# Patient Record
Sex: FEMALE | Race: WHITE
[De-identification: names, ages, dates, MRNs, and addresses within clinical notes are randomized per-mention and may not be internally consistent; named-entity substitution may affect disease eponyms.]

---

## 2018-01-19 ENCOUNTER — HOSPITAL ENCOUNTER (EMERGENCY)
Dept: HOSPITAL 47 - EC | Age: 83
Discharge: HOME | End: 2018-01-19
Payer: MEDICARE

## 2018-01-19 VITALS
SYSTOLIC BLOOD PRESSURE: 128 MMHG | DIASTOLIC BLOOD PRESSURE: 78 MMHG | HEART RATE: 60 BPM | TEMPERATURE: 97.7 F | RESPIRATION RATE: 17 BRPM
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
## 2018-01-19 DIAGNOSIS — Z79.01: ICD-10-CM

## 2018-01-19 DIAGNOSIS — S62.101A: Primary | ICD-10-CM

## 2018-01-19 DIAGNOSIS — E78.5: ICD-10-CM

## 2018-01-19 DIAGNOSIS — I10: ICD-10-CM

## 2018-01-19 DIAGNOSIS — Z88.0: ICD-10-CM

## 2018-01-19 DIAGNOSIS — Z88.1: ICD-10-CM

## 2018-01-19 DIAGNOSIS — S32.008A: ICD-10-CM

## 2018-01-19 DIAGNOSIS — I48.91: ICD-10-CM
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
## 2018-01-19 DIAGNOSIS — Z87.891: ICD-10-CM

## 2018-01-19 DIAGNOSIS — K21.9: ICD-10-CM

## 2018-01-19 DIAGNOSIS — G25.81: ICD-10-CM

## 2018-01-19 DIAGNOSIS — Z79.899: ICD-10-CM

## 2018-01-19 PROCEDURE — 29125 APPL SHORT ARM SPLINT STATIC: CPT

## 2018-01-19 PROCEDURE — 99283 EMERGENCY DEPT VISIT LOW MDM: CPT

## 2018-01-19 PROCEDURE — 72100 X-RAY EXAM L-S SPINE 2/3 VWS: CPT

## 2018-01-19 NOTE — XR
Right wrist

 

HISTORY: Trauma and pain

 

3 views of the right wrist

 

There is a distal radial metaphyseal intra-articular fracture without significant displacement. Bone 
mineralization is reduced. 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 Soft tissue calcificati
ons suggest possible crystal deposition arthropathy. No dislocation. There is soft tissue swelling pr
esent.

 

IMPRESSION: Fracture as described

## 2018-01-19 NOTE — ED
General Adult HPI





- General


Chief complaint: Fall


Stated complaint: Hand Injury/Swelling, Back Pain


Time Seen by Provider: 18 12:23


Source: patient, RN notes reviewed


Mode of arrival: wheelchair


Limitations: no limitations





- History of Present Illness


Initial comments: 





85-year-old female presents to the emergency department with a chief complaint 

of trip and fall.  The patient lost her balance and she fell to the ground.  

She states that she hit her right wrist on a chair and she landed onto her 

bottom.  Since she's been having some low back pain.  She also complains of 

right wrist pain.  Follow was 2 days ago.  There is no lightheadedness or 

dizziness before the fall.  There was no head injury with the fall.  She states 

that her swelling in her right hand just does not seem to be improving so she 

thought that she should be seen. Patient denies any recent fever, chills, 

shortness of breath, chest pain, abdominal pain, nausea vomiting, numbness or 

tingling, dysuria or hematuria, constipation or diarrhea, headaches or visual 

changes, or any other current symptoms.





- Related Data


 Home Medications











 Medication  Instructions  Recorded  Confirmed


 


Furosemide [Lasix] 40 mg PO DAILY 08/02/15 08/02/15


 


LORazepam [Ativan] 0.5 mg PO HS 08/02/15 08/02/15


 


Multivitamins, Thera [Multivitamin 1 tab PO DAILY 08/02/15 08/02/15





(formulary)]   


 


Omeprazole [PriLOSEC] 20 mg PO AC-BRKFST 08/02/15 08/02/15


 


Pravastatin Sodium [Pravachol] 40 mg PO HS 08/02/15 08/02/15


 


Rivaroxaban [Xarelto] 15 mg PO DAILY 08/02/15 08/02/15


 


Spironolactone [Aldactone] 50 mg PO DAILY 08/02/15 08/02/15


 


Tiotropium 18 Mcg/Puff [Spiriva] 2 puff INHALATION RT-DAILY 08/02/15 08/02/15


 


rOPINIRole HCL [Requip] 1 mg PO HS 08/02/15 08/02/15








 Previous Rx's











 Medication  Instructions  Recorded


 


Brimonidine Tartrate [Alphagan P 1 drops LEFT EYE Q8HR  bottle 08/04/15





0.2% Ophth Soln]  


 


Latanoprost Ophth [Xalatan 0.005%] 1 drops BOTH EYES HS  bottle 08/04/15


 


Lisinopril-Hctz 10-12.5 mg 1 each PO DAILY  tab 08/04/15





[Zestoretic 10-12.5]  


 


Timolol 0.25% Ophth Soln [Timoptic 1 drops LEFT EYE BID  bottle 08/04/15





0.25% Ophth Soln]  


 


Acetaminophen-Codeine 300-30mg 1 tab PO Q4H PRN #20 tablet 18





[Tylenol w/codeine #3]  











 Allergies











Allergy/AdvReac Type Severity Reaction Status Date / Time


 


Penicillins Allergy  Rash/Hives Verified 18 12:19


 


sulfamethoxazole Allergy  Rash/Hives Verified 18 12:19





[From Bactrim]     


 


trimethoprim [From Bactrim] Allergy  Rash/Hives Verified 18 12:19














Review of Systems


ROS Statement: 


Those systems with pertinent positive or pertinent negative responses have been 

documented in the HPI.





ROS Other: All systems not noted in ROS Statement are negative.





Past Medical History


Past Medical History: Atrial Fibrillation, GERD/Reflux, GI Bleed, Hyperlipidemia

, Hypertension


Additional Past Medical History / Comment(s): restless leg syndrome


History of Any Multi-Drug Resistant Organisms: None Reported


Past Surgical History: Appendectomy, Cholecystectomy, Hysterectomy, Joint 

Replacement, Orthopedic Surgery


Additional Past Surgical History / Comment(s): cataracts removed, bilateral 

knees


Past Anesthesia/Blood Transfusion Reactions: No Reported Reaction


Past Psychological History: Anxiety


Smoking Status: Former smoker


Past Alcohol Use History: None Reported


Past Drug Use History: None Reported





- Past Family History


  ** Father


Family Medical History: Coronary Artery Disease (CAD)


Additional Family Medical History / Comment(s): rhemuatic fever,  at 59 of 

"bad heart"





  ** Mother


Family Medical History: Cancer


Additional Family Medical History / Comment(s): uterus





General Exam


Limitations: no limitations


General appearance: alert, in no apparent distress


ENT exam: Present: normal exam, mucous membranes moist


Neck exam: Present: normal inspection.  Absent: tenderness, meningismus, 

lymphadenopathy


Respiratory exam: Present: normal lung sounds bilaterally.  Absent: respiratory 

distress, wheezes, rales, rhonchi, stridor


Cardiovascular Exam: Present: regular rate, normal rhythm, normal heart sounds.

  Absent: systolic murmur, diastolic murmur, rubs, gallop, clicks


Extremities exam: Present: full ROM, tenderness (based the right thumb and into 

the right wrist), normal capillary refill.  Absent: normal inspection (patient 

appears to have swelling to the right hand with some tenderness to the base of 

the right thumb and into the right wrist.)


Back exam: Present: normal inspection, full ROM.  Absent: tenderness, CVA 

tenderness (R), CVA tenderness (L), muscle spasm, paraspinal tenderness, 

vertebral tenderness, rash noted


Neurological exam: Present: alert, oriented X3


Psychiatric exam: Present: normal affect, normal mood


Skin exam: Present: warm, dry, intact, normal color.  Absent: rash





Course


 Vital Signs











  18





  12:17


 


Temperature 97.6 F


 


Pulse Rate 58 L


 


Respiratory 18





Rate 


 


Blood Pressure 130/84


 


O2 Sat by Pulse 94 L





Oximetry 














Procedures





- Orthopedic Splinting/Casting


  ** Injury #1


Side: right


Upper Extremity Injury Location: short arm, wrist


Upper Extremity Immobilizer: sling/shoulder immobilizer, volar splint





Medical Decision Making





- Medical Decision Making





85-year-old female presents emergency Department with a chief complaint of fall 

with low back pain and right wrist pain.  Follow was 2 days ago.  She denies 

any saddle anesthesia or loss of bowel or bladder function.at this time patient 

does appear to have a right wrist fracture as well as a lumbar compression 

fracture.  This and we discussed of lung markings compression fractures of from 

this fall or from a previous injury.  We discussed we will place wrist splint 

for the right wrist.  We'll give her pain medication for home.  We discussed 

follow-up with orthopedics we discussed return parameters all questions.  

Patient stated that she understood and she is given this plan.  She'll be 

discharged.





- Radiology Data


Radiology results: report reviewed, image reviewed





Disposition


Clinical Impression: 


 Fall, Right wrist fracture, Lumbar compression fracture





Disposition: HOME SELF-CARE


Condition: Stable


Instructions:  Wrist Fracture in Adults (ED), Vertebral Compression Fracture (ED

)


Additional Instructions: 


Please use medication as discussed. Please follow up with family doctor if 

symptoms have not improved over the next two days. Please return to the 

emergency room if your symptoms increase or worsen or for any other concerns. 


Prescriptions: 


Acetaminophen-Codeine 300-30mg [Tylenol w/codeine #3] 1 tab PO Q4H PRN #20 

tablet


 PRN Reason: Pain


Referrals: 


Kurt Patel DO [Primary Care Provider] - 1-2 days


Brayan Flores DO [Doctor of Osteopathic Medicine] - 1-2 days


Time of Disposition: 13:07

## 2018-01-19 NOTE — XR
Lumbar spine

 

HISTORY: Pain, trauma

 

3 views of the lumbar spine

 

Lumbar vertebral bodies show preserved alignment. There is loss of height at L1 centrally approximate
ly 2550%. Anterolisthesis grade 1 present at L5-S1. Loss of disc height present at the intervertebral
 levels L4-5 and L5-S1, there is vacuum phenomenon present L5-S1. Bone mineralization is reduced. The
re is multilevel spondylosis. Sclerosis present in the posterior elements of the lower lumbar spine. 
Surgical clips noted incidentally in the upper abdomen.

 

IMPRESSION: Osteoporotic compression fracture L1 may be subacute, MRI or bone scan could be performed
 for additional information. Anterolisthesis grade 1 L5-S1 may be due to facet arthropathy. Degenerat
nirav disc disease.

## 2019-09-11 ENCOUNTER — HOSPITAL ENCOUNTER (EMERGENCY)
Dept: HOSPITAL 47 - EC | Age: 84
Discharge: HOME | End: 2019-09-11
Payer: MEDICARE

## 2019-09-11 VITALS
TEMPERATURE: 99 F | RESPIRATION RATE: 18 BRPM | DIASTOLIC BLOOD PRESSURE: 107 MMHG | SYSTOLIC BLOOD PRESSURE: 153 MMHG | HEART RATE: 48 BPM

## 2019-09-11 DIAGNOSIS — G25.81: ICD-10-CM

## 2019-09-11 DIAGNOSIS — Z98.42: ICD-10-CM
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
## 2019-09-11 DIAGNOSIS — I10: ICD-10-CM

## 2019-09-11 DIAGNOSIS — E78.5: ICD-10-CM

## 2019-09-11 DIAGNOSIS — S09.90XA: ICD-10-CM

## 2019-09-11 DIAGNOSIS — Z88.0: ICD-10-CM

## 2019-09-11 DIAGNOSIS — F41.9: ICD-10-CM

## 2019-09-11 DIAGNOSIS — S51.812A: Primary | ICD-10-CM

## 2019-09-11 DIAGNOSIS — Z87.891: ICD-10-CM

## 2019-09-11 DIAGNOSIS — Z88.2: ICD-10-CM

## 2019-09-11 DIAGNOSIS — K21.9: ICD-10-CM

## 2019-09-11 DIAGNOSIS — Z98.41: ICD-10-CM

## 2019-09-11 DIAGNOSIS — Z79.899: ICD-10-CM

## 2019-09-11 DIAGNOSIS — Z79.01: ICD-10-CM
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
## 2019-09-11 DIAGNOSIS — Z23: ICD-10-CM

## 2019-09-11 DIAGNOSIS — Z96.653: ICD-10-CM

## 2019-09-11 DIAGNOSIS — I48.91: ICD-10-CM

## 2019-09-11 DIAGNOSIS — Z79.890: ICD-10-CM

## 2019-09-11 PROCEDURE — 99284 EMERGENCY DEPT VISIT MOD MDM: CPT

## 2019-09-11 PROCEDURE — 90471 IMMUNIZATION ADMIN: CPT

## 2019-09-11 PROCEDURE — 90715 TDAP VACCINE 7 YRS/> IM: CPT

## 2019-09-11 PROCEDURE — 70450 CT HEAD/BRAIN W/O DYE: CPT

## 2019-09-11 PROCEDURE — 12002 RPR S/N/AX/GEN/TRNK2.6-7.5CM: CPT

## 2019-09-11 PROCEDURE — 72125 CT NECK SPINE W/O DYE: CPT

## 2019-09-11 NOTE — ED
General Adult HPI





- General


Chief complaint: Fall


Stated complaint: Fall


Time Seen by Provider: 19 09:55


Source: patient, RN notes reviewed


Mode of arrival: EMS


Limitations: no limitations





- History of Present Illness


Initial comments: 





This is an 87-year-old female who presents to the emergency department 

complaining that she fell in the bathtub.  Patient states she fell and hit her 

head in the left occipital region on the spit it if she went down.  Patient also

has a skin tear on her left posterior forearm.  Patient states she did not lose 

consciousness she was not days she states it is a little tender in the left 

occipital region.  Patient denies any neck pain patient denies numbness 

weakness.  Patient is on Xarelto.  Patient denies any chest pain palpitations 

difficulty breathing shortness of breath per patient denies any back pain 

patient denies any extremity pain besides the area of the skin tear.  Patient 

has full range of motion of all her joints.





- Related Data


                                Home Medications











 Medication  Instructions  Recorded  Confirmed


 


LORazepam [Ativan] 0.5 mg PO HS PRN 08/02/15 09/11/19


 


Omeprazole [PriLOSEC] 20 mg PO AC-BRKFST 08/02/15 09/11/19


 


Pravastatin Sodium [Pravachol] 40 mg PO HS 08/02/15 09/11/19


 


Rivaroxaban [Xarelto] 15 mg PO HS 08/02/15 09/11/19


 


Tiotropium 18 Mcg/Puff [Spiriva] 2 puff INHALATION RT-DAILY 08/02/15 09/11/19


 


rOPINIRole HCL [Requip] 1 mg PO HS 08/02/15 09/11/19


 


Cyanocobalamin (Vitamin B-12) 1,000 mcg PO DAILY 19





[Vitamin B-12]   


 


Donepezil HCl [Aricept] 10 mg PO DAILY 19


 


Furosemide [Lasix] 20 mg PO DAILY 19


 


Latanoprost Ophth [Xalatan 0.005%] 1 drops LEFT EYE HS 19


 


Levothyroxine Sodium [Synthroid] 25 mcg PO DAILY 19


 


Lisinopril [Zestril] 10 mg PO HS 19


 


Spironolactone [Aldactone] 25 mg PO DAILY 19


 


Vit C/E/Zn/Coppr/Lutein/Zeaxan 1 cap PO DAILY 19





[Preservision Areds 2 Softgel]   











                                    Allergies











Allergy/AdvReac Type Severity Reaction Status Date / Time


 


Penicillins Allergy  Rash/Hives Verified 19 10:25


 


sulfamethoxazole Allergy  Rash/Hives Verified 19 10:25





[From Bactrim]     


 


trimethoprim [From Bactrim] Allergy  Rash/Hives Verified 19 10:25














Review of Systems


ROS Statement: 


Those systems with pertinent positive or pertinent negative responses have been 

documented in the HPI.





ROS Other: All systems not noted in ROS Statement are negative.





Past Medical History


Past Medical History: Atrial Fibrillation, GERD/Reflux, GI Bleed, 

Hyperlipidemia, Hypertension


Additional Past Medical History / Comment(s): restless leg syndrome


History of Any Multi-Drug Resistant Organisms: None Reported


Past Surgical History: Appendectomy, Cholecystectomy, Hysterectomy, Joint 

Replacement, Orthopedic Surgery


Additional Past Surgical History / Comment(s): cataracts removed, bilateral 

knees


Past Anesthesia/Blood Transfusion Reactions: No Reported Reaction


Past Psychological History: Anxiety


Smoking Status: Former smoker


Past Alcohol Use History: None Reported


Past Drug Use History: None Reported





- Past Family History


  ** Father


Family Medical History: Coronary Artery Disease (CAD)


Additional Family Medical History / Comment(s): rhemuatic fever,  at 59 of 

"bad heart"





  ** Mother


Family Medical History: Cancer


Additional Family Medical History / Comment(s): uterus





General Exam





- General Exam Comments


Initial Comments: 





GENERAL:


Patient is well-developed and well-nourished.  Patient is nontoxic and well-

hydrated and is in mild distress.  Patient has some tenderness in the left 

occipital region of the scalp there is no redness bleeding or hematoma noted





ENT:


Neck is soft and supple.  No significant lymphadenopathy is noted.  Oropharynx 

is clear.  Moist mucous membranes.  Neck has full range of motion without 

eliciting any pain. 





EYES:


The sclera were anicteric and conjunctiva were pink and moist.  Extraocular 

movements were intact and pupils were equal round and reactive to light.  

Eyelids were unremarkable.





PULMONARY:


Unlabored respirations.  Good breath sounds bilaterally.  No audible rales 

rhonchi or wheezing was noted.





CARDIOVASCULAR:


There is a regular rate and rhythm without any murmurs gallops or rubs.  





ABDOMEN:


Soft and nontender with normal bowel sounds.  No palpable organomegaly was 

noted.  There is no palpable pulsatile mass.





SKIN:


Patient has a skin tear on the left forearm measuring about 4 cm x 4 cm.





NEUROLOGIC:


Patient is alert and oriented x3.  Cranial nerves II through XII are grossly 

intact.  Motor and sensory are also intact.  Normal speech, volume and content. 

Symmetrical smile. 





MUSCULOSKELETAL:


Normal extremities with adequate strength and full range of motion.  No lower 

extremity swelling or edema.  No calf tenderness.





LYMPHATICS:


No significant lymphadenopathy is noted





PSYCHIATRIC:


Normal psychiatric evaluation.  


Limitations: no limitations





Course


                                   Vital Signs











  19





  09:55


 


Temperature 99.0 F


 


Pulse Rate 48 L


 


Respiratory 18





Rate 


 


Blood Pressure 153/107


 


O2 Sat by Pulse 99





Oximetry 














Medical Decision Making





- Medical Decision Making





CT of the brain showed no acute injury CT of the C-spine showed no acute injury





Patient received a tetanus shot in the emergency department and had a skin tear 

closed with Dermabond





Disposition


Clinical Impression: 


 Fall, Head injury, Skin tear





Disposition: HOME SELF-CARE


Condition: Good


Instructions (If sedation given, give patient instructions):  Fall Prevention 

for Older Adults (ED)


Is patient prescribed a controlled substance at d/c from ED?: No


Referrals: 


Kurt Patel DO [Primary Care Provider] - 1-2 days


Time of Disposition: 12:14

## 2019-09-11 NOTE — CT
EXAMINATION TYPE: CT brain pam wo con

 

DATE OF EXAM: 9/11/2019

 

COMPARISON: Brain 10/29/2012

 

HISTORY: 87-year-old female pain after Slip and fall in the bathtub

 

CT DLP: 1265.3 mGycm

Automated exposure control for dose reduction was used.

 

Technique:  Examination of the head was done in axial plane without intravenous contrast. Coronal and
 sagittal reconstructions performed.

 

CT of the cervical spine was obtained in axial plane without intravenous injection of  contrast mater
ial.  Coronal and sagittal reformatted images were obtained from the axial views for evaluation of  f
ractures, spinal alignment and canal.

 

 

FINDINGS:

 

Head:

There is no evidence of  acute intracranial hemorrhage, acute ischemic changes, mass, mass-effect, or
 extra-axial fluid collection.  There is no effacement of cerebral sulci or basal subarachnoid cister
ns.  There is no hydrocephalus.  There is no midline shift.  Gray-white matter distinction is preserv
ed.

 

Ophthalmologic device along the outer anterior aspect of the left globe. Mastoid air cells well pneum
atized. No calvarial fracture. Paranasal sinuses well pneumatized.

 

Mild central cerebral atrophy with secondary prominence to the ventricular system.

 

 

Cervical spine:

Retropharyngeal course of the right ICA impressing on the right posterior aspect of the hypopharynx. 
However, there is asymmetric thickening along the base of the right uvula, axial image 45. This may b
e secondary to soft tissue redundancy.

 

No craniocervical junction abnormality, predental space widening, or prevertebral soft tissue swellin
g. Degenerative change at C1 dens articulation.

 

Facet and uncovertebral joint arthropathy throughout with grade 1 anterolisthesis at C3-C4 and C4-C5.
 Additional grade 2 anterolisthesis at C7-T1.

 

Moderate disc/endplate degenerative change at C5-C6 and C6-C7.

 

No acute fracture of the cervical spine.

 

Variable moderate bilateral neuroforaminal stenoses throughout.

 

Visualized upper lungs show moderate centrilobular emphysema.

 

Sagittal and coronal reformatted images confirm above findings.

 

 

COMBINED IMPRESSION:

1. Mild generalized atrophy without acute intracranial abnormality seen.

 

2. No acute fracture of the cervical spine. Moderate spondylotic change with grade 1 anterolistheses 
at C3-C4, C4-C5, and C7-T1.

 

3. Asymmetric thickening along the right lateral base of the uvula. ENT referral for direct visualiza
tion. Mucosal space lesion versus redundant soft tissue from the retroareolar pharyngeal course of th
e right ICA.

## 2020-01-16 ENCOUNTER — HOSPITAL ENCOUNTER (INPATIENT)
Dept: HOSPITAL 47 - EC | Age: 85
LOS: 5 days | Discharge: HOME HEALTH | DRG: 291 | End: 2020-01-21
Payer: COMMERCIAL

## 2020-01-16 VITALS — BODY MASS INDEX: 29.4 KG/M2

## 2020-01-16 DIAGNOSIS — Z96.653: ICD-10-CM

## 2020-01-16 DIAGNOSIS — Z90.49: ICD-10-CM

## 2020-01-16 DIAGNOSIS — Z87.891: ICD-10-CM

## 2020-01-16 DIAGNOSIS — I08.1: ICD-10-CM

## 2020-01-16 DIAGNOSIS — I13.0: Primary | ICD-10-CM

## 2020-01-16 DIAGNOSIS — Z88.0: ICD-10-CM

## 2020-01-16 DIAGNOSIS — I48.19: ICD-10-CM

## 2020-01-16 DIAGNOSIS — Z82.49: ICD-10-CM

## 2020-01-16 DIAGNOSIS — G25.81: ICD-10-CM

## 2020-01-16 DIAGNOSIS — Z79.890: ICD-10-CM

## 2020-01-16 DIAGNOSIS — F41.9: ICD-10-CM

## 2020-01-16 DIAGNOSIS — K21.9: ICD-10-CM

## 2020-01-16 DIAGNOSIS — Z98.42: ICD-10-CM

## 2020-01-16 DIAGNOSIS — Z90.710: ICD-10-CM

## 2020-01-16 DIAGNOSIS — J44.1: ICD-10-CM

## 2020-01-16 DIAGNOSIS — Z71.3: ICD-10-CM

## 2020-01-16 DIAGNOSIS — I50.33: ICD-10-CM

## 2020-01-16 DIAGNOSIS — Z80.49: ICD-10-CM

## 2020-01-16 DIAGNOSIS — Z79.899: ICD-10-CM

## 2020-01-16 DIAGNOSIS — Z87.19: ICD-10-CM

## 2020-01-16 DIAGNOSIS — Z79.01: ICD-10-CM

## 2020-01-16 DIAGNOSIS — J96.01: ICD-10-CM

## 2020-01-16 DIAGNOSIS — Z98.890: ICD-10-CM

## 2020-01-16 DIAGNOSIS — I27.20: ICD-10-CM

## 2020-01-16 DIAGNOSIS — N18.9: ICD-10-CM

## 2020-01-16 DIAGNOSIS — R73.9: ICD-10-CM

## 2020-01-16 DIAGNOSIS — Z98.41: ICD-10-CM

## 2020-01-16 DIAGNOSIS — E78.5: ICD-10-CM

## 2020-01-16 DIAGNOSIS — Z88.2: ICD-10-CM

## 2020-01-16 DIAGNOSIS — K59.00: ICD-10-CM

## 2020-01-16 DIAGNOSIS — M19.90: ICD-10-CM

## 2020-01-16 DIAGNOSIS — F03.90: ICD-10-CM

## 2020-01-16 LAB
ALBUMIN SERPL-MCNC: 4.3 G/DL (ref 3.5–5)
ALP SERPL-CCNC: 143 U/L (ref 38–126)
ALT SERPL-CCNC: 12 U/L (ref 4–34)
ANION GAP SERPL CALC-SCNC: 10 MMOL/L
APTT BLD: 26.9 SEC (ref 22–30)
AST SERPL-CCNC: 22 U/L (ref 14–36)
BASOPHILS # BLD AUTO: 0 K/UL (ref 0–0.2)
BASOPHILS NFR BLD AUTO: 1 %
BUN SERPL-SCNC: 18 MG/DL (ref 7–17)
CALCIUM SPEC-MCNC: 9.2 MG/DL (ref 8.4–10.2)
CHLORIDE SERPL-SCNC: 100 MMOL/L (ref 98–107)
CO2 SERPL-SCNC: 27 MMOL/L (ref 22–30)
EOSINOPHIL # BLD AUTO: 0 K/UL (ref 0–0.7)
EOSINOPHIL NFR BLD AUTO: 1 %
ERYTHROCYTE [DISTWIDTH] IN BLOOD BY AUTOMATED COUNT: 4.3 M/UL (ref 3.8–5.4)
ERYTHROCYTE [DISTWIDTH] IN BLOOD: 12.4 % (ref 11.5–15.5)
GLUCOSE SERPL-MCNC: 157 MG/DL (ref 74–99)
HCT VFR BLD AUTO: 38.5 % (ref 34–46)
HGB BLD-MCNC: 13 GM/DL (ref 11.4–16)
HYALINE CASTS UR QL AUTO: 9 /LPF (ref 0–2)
INR PPP: 1.2 (ref ?–1.2)
LYMPHOCYTES # SPEC AUTO: 0.4 K/UL (ref 1–4.8)
LYMPHOCYTES NFR SPEC AUTO: 9 %
MAGNESIUM SPEC-SCNC: 1.7 MG/DL (ref 1.6–2.3)
MCH RBC QN AUTO: 30.3 PG (ref 25–35)
MCHC RBC AUTO-ENTMCNC: 33.8 G/DL (ref 31–37)
MCV RBC AUTO: 89.6 FL (ref 80–100)
MONOCYTES # BLD AUTO: 0.3 K/UL (ref 0–1)
MONOCYTES NFR BLD AUTO: 6 %
NEUTROPHILS # BLD AUTO: 3.1 K/UL (ref 1.3–7.7)
NEUTROPHILS NFR BLD AUTO: 79 %
PH UR: 5 [PH] (ref 5–8)
PLATELET # BLD AUTO: 154 K/UL (ref 150–450)
POTASSIUM SERPL-SCNC: 4.3 MMOL/L (ref 3.5–5.1)
PROT SERPL-MCNC: 7.4 G/DL (ref 6.3–8.2)
PT BLD: 11.7 SEC (ref 9–12)
RBC UR QL: <1 /HPF (ref 0–5)
SODIUM SERPL-SCNC: 137 MMOL/L (ref 137–145)
SP GR UR: 1.01 (ref 1–1.03)
UROBILINOGEN UR QL STRIP: <2 MG/DL (ref ?–2)
WBC # BLD AUTO: 3.9 K/UL (ref 3.8–10.6)
WBC # UR AUTO: 1 /HPF (ref 0–5)

## 2020-01-16 PROCEDURE — 93005 ELECTROCARDIOGRAM TRACING: CPT

## 2020-01-16 PROCEDURE — 85025 COMPLETE CBC W/AUTO DIFF WBC: CPT

## 2020-01-16 PROCEDURE — 96374 THER/PROPH/DIAG INJ IV PUSH: CPT

## 2020-01-16 PROCEDURE — 93306 TTE W/DOPPLER COMPLETE: CPT

## 2020-01-16 PROCEDURE — 36415 COLL VENOUS BLD VENIPUNCTURE: CPT

## 2020-01-16 PROCEDURE — 94640 AIRWAY INHALATION TREATMENT: CPT

## 2020-01-16 PROCEDURE — 83880 ASSAY OF NATRIURETIC PEPTIDE: CPT

## 2020-01-16 PROCEDURE — 71046 X-RAY EXAM CHEST 2 VIEWS: CPT

## 2020-01-16 PROCEDURE — 85730 THROMBOPLASTIN TIME PARTIAL: CPT

## 2020-01-16 PROCEDURE — 83735 ASSAY OF MAGNESIUM: CPT

## 2020-01-16 PROCEDURE — 80053 COMPREHEN METABOLIC PANEL: CPT

## 2020-01-16 PROCEDURE — 84484 ASSAY OF TROPONIN QUANT: CPT

## 2020-01-16 PROCEDURE — 80048 BASIC METABOLIC PNL TOTAL CA: CPT

## 2020-01-16 PROCEDURE — 81001 URINALYSIS AUTO W/SCOPE: CPT

## 2020-01-16 PROCEDURE — 99285 EMERGENCY DEPT VISIT HI MDM: CPT

## 2020-01-16 PROCEDURE — 96375 TX/PRO/DX INJ NEW DRUG ADDON: CPT

## 2020-01-16 PROCEDURE — 87502 INFLUENZA DNA AMP PROBE: CPT

## 2020-01-16 PROCEDURE — 94760 N-INVAS EAR/PLS OXIMETRY 1: CPT

## 2020-01-16 PROCEDURE — 85610 PROTHROMBIN TIME: CPT

## 2020-01-16 RX ADMIN — DONEPEZIL HYDROCHLORIDE SCH MG: 10 TABLET ORAL at 21:41

## 2020-01-16 RX ADMIN — IPRATROPIUM BROMIDE AND ALBUTEROL SULFATE PRN ML: .5; 3 SOLUTION RESPIRATORY (INHALATION) at 21:04

## 2020-01-16 RX ADMIN — LISINOPRIL SCH MG: 10 TABLET ORAL at 20:53

## 2020-01-16 RX ADMIN — RIVAROXABAN SCH MG: 15 TABLET, FILM COATED ORAL at 20:53

## 2020-01-16 RX ADMIN — PRAVASTATIN SODIUM SCH MG: 40 TABLET ORAL at 20:53

## 2020-01-16 NOTE — XR
EXAMINATION: XR chest 2V

DATE AND TIME:  1/16/2020 5:00 PM

 

CLINICAL INDICATION: PHH; difficulty breathing

 

TECHNIQUE: Departmental protocol

 

COMPARISON: 8/2/2015

 

FINDINGS: 

The lungs are clear. 

 

The pleural spaces are negative.

 

The cardiac silhouette is moderate-markedly enlarged, similar to the prior study. The marked thoracic
 aorta tortuosity seen in the prior study is redemonstrated, with similar appearance. 

 

The skeletal structures and soft tissues are negative for acute findings.

 

IMPRESSION:

No definite acute radiographic process.

## 2020-01-16 NOTE — ED
SOB HPI





- General


Source: patient, RN notes reviewed


Mode of arrival: wheelchair


Limitations: no limitations





- History of Present Illness


MD Complaint: shortness of breath





<Bala Rasmussen - Last Filed: 20 16:57>





<Aditya Renae - Last Filed: 20 17:34>





- General


Chief Complaint: Shortness of Breath


Stated Complaint: JADEN


Time Seen by Provider: 20 15:37





- History of Present Illness


Initial Comments: 





This is a 87-year-old female history of COPD CHF chronic renal disease as well 

as chronic A. fib and hyperglycemia the past who presents with complaints of 2 

days of shortness of breath cough no phlegm she has had no chills or sweats but 

states she felt somewhat feverish also she's been having urinary frequency.  She

denies any dysuria denies any chest pain or palpitations no other complaints at 

this time.  She states she was seen in outpatient clinic and sent here for 

further evaluation the provider at the other facility or is concerned about 

peripheral edema though the patient states her legs are no different than what 

he normally locally. (Bala Rasmussen)





- Related Data


                                Home Medications











 Medication  Instructions  Recorded  Confirmed


 


LORazepam [Ativan] 0.5 mg PO HS PRN 08/02/15 09/11/19


 


Omeprazole [PriLOSEC] 20 mg PO AC-BRKFST 08/02/15 09/11/19


 


Pravastatin Sodium [Pravachol] 40 mg PO HS 08/02/15 09/11/19


 


Rivaroxaban [Xarelto] 15 mg PO HS 08/02/15 09/11/19


 


Tiotropium 18 Mcg/Puff [Spiriva] 2 puff INHALATION RT-DAILY 08/02/15 09/11/19


 


rOPINIRole HCL [Requip] 1 mg PO HS 08/02/15 09/11/19


 


Cyanocobalamin (Vitamin B-12) 1,000 mcg PO DAILY 19





[Vitamin B-12]   


 


Donepezil HCl [Aricept] 10 mg PO DAILY 19


 


Furosemide [Lasix] 20 mg PO DAILY 19


 


Latanoprost Ophth [Xalatan 0.005%] 1 drops LEFT EYE HS 19


 


Levothyroxine Sodium [Synthroid] 25 mcg PO DAILY 19


 


Lisinopril [Zestril] 10 mg PO HS 19


 


Spironolactone [Aldactone] 25 mg PO DAILY 19


 


Vit C/E/Zn/Coppr/Lutein/Zeaxan 1 cap PO DAILY 19





[Preservision Areds 2 Softgel]   











                                    Allergies











Allergy/AdvReac Type Severity Reaction Status Date / Time


 


Penicillins Allergy  Rash/Hives Verified 20 15:40


 


sulfamethoxazole Allergy  Rash/Hives Verified 20 15:40





[From Bactrim]     


 


trimethoprim [From Bactrim] Allergy  Rash/Hives Verified 20 15:40














Review of Systems


ROS Other: All systems not noted in ROS Statement are negative.





<Bala Rasmussen - Last Filed: 20 16:57>


ROS Other: All systems not noted in ROS Statement are negative.





<Aditya Renae - Last Filed: 20 17:34>


ROS Statement: 


Those systems with pertinent positive or pertinent negative responses have been 

documented in the HPI.








Past Medical History


Past Medical History: Atrial Fibrillation, Heart Failure, COPD, GERD/Reflux, GI 

Bleed, Hyperlipidemia, Hypertension


Additional Past Medical History / Comment(s): restless leg syndrome


History of Any Multi-Drug Resistant Organisms: None Reported


Past Surgical History: Appendectomy, Cholecystectomy, Hysterectomy, Joint 

Replacement, Orthopedic Surgery


Additional Past Surgical History / Comment(s): cataracts removed, bilateral 

knees


Past Anesthesia/Blood Transfusion Reactions: No Reported Reaction


Past Psychological History: Anxiety


Smoking Status: Former smoker


Past Alcohol Use History: None Reported


Past Drug Use History: None Reported





- Past Family History


  ** Father


Family Medical History: Coronary Artery Disease (CAD)


Additional Family Medical History / Comment(s): rhemuatic fever,  at 59 of 

"bad heart"





  ** Mother


Family Medical History: Cancer


Additional Family Medical History / Comment(s): uterus





<Bala Rasmussen - Last Filed: 20 16:57>





General Exam


Limitations: no limitations


General appearance: alert, anxious, in distress


Head exam: Present: atraumatic, normocephalic, normal inspection


Eye exam: Present: normal appearance, PERRL, EOMI.  Absent: scleral icterus, 

conjunctival injection, periorbital swelling


ENT exam: Present: normal exam, mucous membranes moist


Neck exam: Present: normal inspection, full ROM, other.  Absent: tenderness, 

meningismus, lymphadenopathy


Respiratory exam: Present: accessory muscle use, decreased breath sounds.  

Absent: respiratory distress, wheezes (No stridor to 30 or bruits), rales, 

rhonchi, stridor


Cardiovascular Exam: Present: irregular rhythm.  Absent: systolic murmur, 

diastolic murmur, rubs, gallop, clicks


GI/Abdominal exam: Present: soft, normal bowel sounds.  Absent: distended, te

nderness, guarding, rebound, rigid


Extremities exam: Present: normal inspection, full ROM, normal capillary refill.

 Absent: tenderness, pedal edema, joint swelling, calf tenderness


Back exam: Present: normal inspection


Neurological exam: Present: alert, oriented X3, CN II-XII intact


Psychiatric exam: Present: normal affect, normal mood


Skin exam: Present: warm, dry, intact, normal color.  Absent: rash





<Bala Rasmussen - Last Filed: 20 16:57>





- General Exam Comments


Initial Comments: 





This is a well-developed well-nourished awake alert oriented 3 female he does 

demonstrate audible wheezing (Bala Rasmussen)





Course





<Bala Rasmussen - Last Filed: 20 16:57>


                                   Vital Signs











  20





  15:35 15:43 15:49


 


Temperature  98.1 F 


 


Pulse Rate 87  65


 


Respiratory 30 H  





Rate   


 


Blood Pressure 167/113  


 


O2 Sat by Pulse 90 L  





Oximetry   














  20





  15:57


 


Temperature 


 


Pulse Rate 68


 


Respiratory 





Rate 


 


Blood Pressure 


 


O2 Sat by Pulse 





Oximetry 














- Reevaluation(s)


Reevaluation #1: 





20 16:58


Patient's case will be endorsed to Dr. Batool gaytan (Bala Rasmussen)





Medical Decision Making





- Lab Data


Result diagrams: 


                                 20 16:15





                                 20 16:15





- EKG Data


-: EKG Interpreted by Me (Atrial fibrillation with occasional PVC's, nonspecific

ST-T wave configurat)





<Bala Rasmussen - Last Filed: 20 16:57>





- Lab Data


Result diagrams: 


                                 20 16:15





                                 20 16:15





<Aditya Renae - Last Filed: 20 17:34>





- Medical Decision Making


Patient care was signed out to me by previous shift physician Dr. Rasmussen.  

Briefly, patient is a 47-year-old female w multiple comorbidities.  She presents

today with shortness of breath.  Patient was found have wheezing with 

auscultation of the lungs.  She is on Xarelto for atrial fibrillation.  Patient 

has history of heart failure and COPD.  Plan at sign out was to follow-up with 

pending labs, imaging and reevaluating the patient.  Patient currently on 

anticoagulation medication.  Chances of having pulmonary embolism is unlikely.  

Patient was treated with breathing treatment and steroids for the presumptive 

diagnosis of COPD exacerbation.





Labs were reviewed.  Patient has elevated prematurity peptide of 3600.  Her x-

ray is unremarkable.  Patient reevaluated after first breathing treatment with 

slight improvement however she is still tachypneic and showing signs of 

increased work of breathing.  Second breathing treatment was ordered she is also

given Lasix for concern of cardiac wheeze.  Discussed patient case with Dr. Kurt Patel is willing to accept patients care.  He reports that patient does not have

a history of COPD and that she does have history of heart failure.  He is 

willing to accept patients care for admission.  Patient is understandable and 

agreeable to plan.  We will place cardiology on consultation as well as.


 (Aditya Renae)





- Lab Data


                                   Lab Results











  20 Range/Units





  16:15 16:15 16:15 


 


WBC  3.9    (3.8-10.6)  k/uL


 


RBC  4.30    (3.80-5.40)  m/uL


 


Hgb  13.0    (11.4-16.0)  gm/dL


 


Hct  38.5    (34.0-46.0)  %


 


MCV  89.6    (80.0-100.0)  fL


 


MCH  30.3    (25.0-35.0)  pg


 


MCHC  33.8    (31.0-37.0)  g/dL


 


RDW  12.4    (11.5-15.5)  %


 


Plt Count  154    (150-450)  k/uL


 


Neutrophils %  79    %


 


Lymphocytes %  9    %


 


Monocytes %  6    %


 


Eosinophils %  1    %


 


Basophils %  1    %


 


Neutrophils #  3.1    (1.3-7.7)  k/uL


 


Lymphocytes #  0.4 L    (1.0-4.8)  k/uL


 


Monocytes #  0.3    (0-1.0)  k/uL


 


Eosinophils #  0.0    (0-0.7)  k/uL


 


Basophils #  0.0    (0-0.2)  k/uL


 


PT     (9.0-12.0)  sec


 


INR     (<1.2)  


 


APTT     (22.0-30.0)  sec


 


Sodium   137   (137-145)  mmol/L


 


Potassium   4.3   (3.5-5.1)  mmol/L


 


Chloride   100   ()  mmol/L


 


Carbon Dioxide   27   (22-30)  mmol/L


 


Anion Gap   10   mmol/L


 


BUN   18 H   (7-17)  mg/dL


 


Creatinine   0.90   (0.52-1.04)  mg/dL


 


Est GFR (CKD-EPI)AfAm   67   (>60 ml/min/1.73 sqM)  


 


Est GFR (CKD-EPI)NonAf   58   (>60 ml/min/1.73 sqM)  


 


Glucose   157 H   (74-99)  mg/dL


 


Calcium   9.2   (8.4-10.2)  mg/dL


 


Magnesium   1.7   (1.6-2.3)  mg/dL


 


Total Bilirubin   0.7   (0.2-1.3)  mg/dL


 


AST   22   (14-36)  U/L


 


ALT   12   (4-34)  U/L


 


Alkaline Phosphatase   143 H   ()  U/L


 


Troponin I     (0.000-0.034)  ng/mL


 


NT-Pro-B Natriuret Pep    3600  pg/mL


 


Total Protein   7.4   (6.3-8.2)  g/dL


 


Albumin   4.3   (3.5-5.0)  g/dL


 


Urine Color     


 


Urine Appearance     (Clear)  


 


Urine pH     (5.0-8.0)  


 


Ur Specific Gravity     (1.001-1.035)  


 


Urine Protein     (Negative)  


 


Urine Glucose (UA)     (Negative)  


 


Urine Ketones     (Negative)  


 


Urine Blood     (Negative)  


 


Urine Nitrite     (Negative)  


 


Urine Bilirubin     (Negative)  


 


Urine Urobilinogen     (<2.0)  mg/dL


 


Ur Leukocyte Esterase     (Negative)  


 


Urine RBC     (0-5)  /hpf


 


Urine WBC     (0-5)  /hpf


 


Urine Bacteria     (None)  /hpf


 


Hyaline Casts     (0-2)  /lpf


 


Urine Mucus     (None)  /hpf














  20 Range/Units





  16:15 16:15 17:00 


 


WBC     (3.8-10.6)  k/uL


 


RBC     (3.80-5.40)  m/uL


 


Hgb     (11.4-16.0)  gm/dL


 


Hct     (34.0-46.0)  %


 


MCV     (80.0-100.0)  fL


 


MCH     (25.0-35.0)  pg


 


MCHC     (31.0-37.0)  g/dL


 


RDW     (11.5-15.5)  %


 


Plt Count     (150-450)  k/uL


 


Neutrophils %     %


 


Lymphocytes %     %


 


Monocytes %     %


 


Eosinophils %     %


 


Basophils %     %


 


Neutrophils #     (1.3-7.7)  k/uL


 


Lymphocytes #     (1.0-4.8)  k/uL


 


Monocytes #     (0-1.0)  k/uL


 


Eosinophils #     (0-0.7)  k/uL


 


Basophils #     (0-0.2)  k/uL


 


PT  11.7    (9.0-12.0)  sec


 


INR  1.2 H    (<1.2)  


 


APTT  26.9    (22.0-30.0)  sec


 


Sodium     (137-145)  mmol/L


 


Potassium     (3.5-5.1)  mmol/L


 


Chloride     ()  mmol/L


 


Carbon Dioxide     (22-30)  mmol/L


 


Anion Gap     mmol/L


 


BUN     (7-17)  mg/dL


 


Creatinine     (0.52-1.04)  mg/dL


 


Est GFR (CKD-EPI)AfAm     (>60 ml/min/1.73 sqM)  


 


Est GFR (CKD-EPI)NonAf     (>60 ml/min/1.73 sqM)  


 


Glucose     (74-99)  mg/dL


 


Calcium     (8.4-10.2)  mg/dL


 


Magnesium     (1.6-2.3)  mg/dL


 


Total Bilirubin     (0.2-1.3)  mg/dL


 


AST     (14-36)  U/L


 


ALT     (4-34)  U/L


 


Alkaline Phosphatase     ()  U/L


 


Troponin I   0.014   (0.000-0.034)  ng/mL


 


NT-Pro-B Natriuret Pep     pg/mL


 


Total Protein     (6.3-8.2)  g/dL


 


Albumin     (3.5-5.0)  g/dL


 


Urine Color    Yellow  


 


Urine Appearance    Clear  (Clear)  


 


Urine pH    5.0  (5.0-8.0)  


 


Ur Specific Gravity    1.010  (1.001-1.035)  


 


Urine Protein    Negative  (Negative)  


 


Urine Glucose (UA)    Negative  (Negative)  


 


Urine Ketones    Negative  (Negative)  


 


Urine Blood    Negative  (Negative)  


 


Urine Nitrite    Negative  (Negative)  


 


Urine Bilirubin    Negative  (Negative)  


 


Urine Urobilinogen    <2.0  (<2.0)  mg/dL


 


Ur Leukocyte Esterase    Trace H  (Negative)  


 


Urine RBC    <1  (0-5)  /hpf


 


Urine WBC    1  (0-5)  /hpf


 


Urine Bacteria    Rare H  (None)  /hpf


 


Hyaline Casts    9 H  (0-2)  /lpf


 


Urine Mucus    Rare H  (None)  /hpf














Disposition





<Bala Rasmussen - Last Filed: 20 16:57>


Decision Time: 17:34





<Aditya Renae - Last Filed: 20 17:34>


Clinical Impression: 


 Dyspnea





Disposition: ADMITTED AS IP TO THIS [facility]


Condition: Fair


Referrals: 


Kurt Patel DO [Primary Care Provider] - 1-2 days

## 2020-01-17 LAB
GLUCOSE BLD-MCNC: 136 MG/DL (ref 75–99)
GLUCOSE BLD-MCNC: 163 MG/DL (ref 75–99)

## 2020-01-17 RX ADMIN — GUAIFENESIN PRN MG: 200 SOLUTION ORAL at 20:05

## 2020-01-17 RX ADMIN — IPRATROPIUM BROMIDE AND ALBUTEROL SULFATE SCH: .5; 3 SOLUTION RESPIRATORY (INHALATION) at 12:28

## 2020-01-17 RX ADMIN — RIVAROXABAN SCH MG: 15 TABLET, FILM COATED ORAL at 20:04

## 2020-01-17 RX ADMIN — INSULIN ASPART SCH: 100 INJECTION, SOLUTION INTRAVENOUS; SUBCUTANEOUS at 17:04

## 2020-01-17 RX ADMIN — BUDESONIDE SCH MG: 1 SUSPENSION RESPIRATORY (INHALATION) at 20:17

## 2020-01-17 RX ADMIN — METHYLPREDNISOLONE SODIUM SUCCINATE SCH MG: 40 INJECTION, POWDER, FOR SOLUTION INTRAMUSCULAR; INTRAVENOUS at 11:09

## 2020-01-17 RX ADMIN — GUAIFENESIN PRN MG: 200 SOLUTION ORAL at 11:09

## 2020-01-17 RX ADMIN — LEVOTHYROXINE SODIUM SCH MCG: 25 TABLET ORAL at 06:22

## 2020-01-17 RX ADMIN — IPRATROPIUM BROMIDE AND ALBUTEROL SULFATE SCH ML: .5; 3 SOLUTION RESPIRATORY (INHALATION) at 20:17

## 2020-01-17 RX ADMIN — FUROSEMIDE SCH MG: 10 INJECTION, SOLUTION INTRAMUSCULAR; INTRAVENOUS at 17:17

## 2020-01-17 RX ADMIN — FUROSEMIDE SCH MG: 10 INJECTION, SOLUTION INTRAMUSCULAR; INTRAVENOUS at 06:22

## 2020-01-17 RX ADMIN — METHYLPREDNISOLONE SODIUM SUCCINATE SCH MG: 40 INJECTION, POWDER, FOR SOLUTION INTRAMUSCULAR; INTRAVENOUS at 23:25

## 2020-01-17 RX ADMIN — PANTOPRAZOLE SODIUM SCH MG: 40 TABLET, DELAYED RELEASE ORAL at 06:22

## 2020-01-17 RX ADMIN — INSULIN ASPART SCH UNIT: 100 INJECTION, SOLUTION INTRAVENOUS; SUBCUTANEOUS at 20:29

## 2020-01-17 RX ADMIN — IPRATROPIUM BROMIDE SCH MG: 0.5 SOLUTION RESPIRATORY (INHALATION) at 07:01

## 2020-01-17 RX ADMIN — IPRATROPIUM BROMIDE SCH: 0.5 SOLUTION RESPIRATORY (INHALATION) at 11:01

## 2020-01-17 RX ADMIN — PRAVASTATIN SODIUM SCH MG: 40 TABLET ORAL at 20:04

## 2020-01-17 RX ADMIN — IPRATROPIUM BROMIDE AND ALBUTEROL SULFATE SCH ML: .5; 3 SOLUTION RESPIRATORY (INHALATION) at 16:23

## 2020-01-17 RX ADMIN — LATANOPROST SCH DROPS: 50 SOLUTION OPHTHALMIC at 20:04

## 2020-01-17 RX ADMIN — IPRATROPIUM BROMIDE AND ALBUTEROL SULFATE PRN ML: .5; 3 SOLUTION RESPIRATORY (INHALATION) at 11:01

## 2020-01-17 RX ADMIN — CYANOCOBALAMIN TAB 500 MCG SCH MCG: 500 TAB at 08:16

## 2020-01-17 RX ADMIN — DONEPEZIL HYDROCHLORIDE SCH MG: 10 TABLET ORAL at 08:16

## 2020-01-17 RX ADMIN — FORMOTEROL FUMARATE DIHYDRATE SCH MCG: 20 SOLUTION RESPIRATORY (INHALATION) at 20:17

## 2020-01-17 RX ADMIN — METHYLPREDNISOLONE SODIUM SUCCINATE SCH MG: 40 INJECTION, POWDER, FOR SOLUTION INTRAMUSCULAR; INTRAVENOUS at 15:13

## 2020-01-17 RX ADMIN — LISINOPRIL SCH MG: 10 TABLET ORAL at 20:04

## 2020-01-17 NOTE — P.HPIM
History of Present Illness


H&P Date: 20


Chief Complaint: Worsening shortness of breath





87-year-old female with history of atrial fibrillation, CHF, COPD, 

gastroesophageal reflux disease, GI bleed, former nicotine dependence and 

multiple other medical issues presented to the ER with complaints of worsening 

shortness of breath over the last couple of days accompanied by nonproductive 

cough, peripheral edema ,no chills, no diaphoresis, febrile.  Initially 

evaluated at outside clinic and referred to the ER for further evaluation.  Upon

arrival patient using accessory muscles, tachypneic, with O2 sat of 90% on room 

air. EKG report atrial fibrillation with PVCs, nonspecific ST and T waves, 

troponin 0.014, BNP 3600. Denies any chest pain, palpitations.  Labs essentially

unremarkable. Afebrile, normal WBC, INR 1.2 BUN 18 creatinine 0.9, potassium 

4.3, magnesium 1.7, alk phos 143.  UA reporting rare bacteria trace of 

leukocytes , WBC of 1 ,negative for nitrates.  Chest x-ray reporting similar to 

prior study, non acute.  Nebulized breathing treatments, steroids, Lasix and 

she.





Review of Systems


ROS Other: All systems not noted in ROS Statement are negative.





ROS Statement: 


Those systems with pertinent positive or pertinent negative responses have been 

documented in the HPI.











Past Medical History


Past Medical History: Atrial Fibrillation, Heart Failure, COPD, GERD/Reflux, GI 

Bleed, Hyperlipidemia, Hypertension


Additional Past Medical History / Comment(s): restless leg syndrome


History of Any Multi-Drug Resistant Organisms: None Reported


Past Surgical History: Appendectomy, Cholecystectomy, Hysterectomy, Joint 

Replacement, Orthopedic Surgery


Additional Past Surgical History / Comment(s): cataracts removed, bilateral 

knees


Past Anesthesia/Blood Transfusion Reactions: No Reported Reaction


Past Psychological History: Anxiety


Smoking Status: Former smoker


Past Alcohol Use History: None Reported


Past Drug Use History: None Reported





- Past Family History


  ** Father


Family Medical History: Coronary Artery Disease (CAD)


Additional Family Medical History / Comment(s): rhemuatic fever,  at 59 of 

"bad heart"





  ** Mother


Family Medical History: Cancer


Additional Family Medical History / Comment(s): uterus





Medications and Allergies


                                Home Medications











 Medication  Instructions  Recorded  Confirmed  Type


 


LORazepam [Ativan] 0.5 mg PO HS PRN 08/02/15 01/16/20 History


 


Omeprazole [PriLOSEC] 20 mg PO AC-BRKFST 08/02/15 01/16/20 History


 


Pravastatin Sodium [Pravachol] 40 mg PO HS 08/02/15 01/16/20 History


 


Rivaroxaban [Xarelto] 15 mg PO HS 08/02/15 01/16/20 History


 


Tiotropium 18 Mcg/Puff [Spiriva] 2 cap INHALATION RT-DAILY 08/02/15 01/16/20 

History


 


rOPINIRole HCL [Requip] 1 mg PO HS 08/02/15 01/16/20 History


 


Cyanocobalamin (Vitamin B-12) 1,000 mcg PO DAILY 19 History





[Vitamin B-12]    


 


Donepezil HCl [Aricept] 10 mg PO DAILY 19 History


 


Furosemide [Lasix] 20 mg PO DAILY 19 History


 


Latanoprost Ophth [Xalatan 0.005%] 1 drops LEFT EYE HS 19 History


 


Levothyroxine Sodium [Synthroid] 25 mcg PO DAILY 19 History


 


Lisinopril [Zestril] 10 mg PO HS 19 History


 


Spironolactone [Aldactone] 25 mg PO DAILY 19 History


 


Vit C/E/Zn/Coppr/Lutein/Zeaxan 1 cap PO DAILY 19 History





[Preservision Areds 2 Softgel]    








                                    Allergies











Allergy/AdvReac Type Severity Reaction Status Date / Time


 


Penicillins Allergy  Rash/Hives Verified 20 18:45


 


sulfamethoxazole Allergy  Rash/Hives Verified 20 18:45





[From Bactrim]     


 


trimethoprim [From Bactrim] Allergy  Rash/Hives Verified 20 18:45














Physical Exam


Vitals: 


                                   Vital Signs











  Temp Pulse Pulse Resp BP BP Pulse Ox


 


 20 11:15   70     


 


 20 11:03   76     


 


 20 08:00  97.7 F   72  18   161/77  96


 


 20 07:14   72     


 


 20 07:04   68      98


 


 20 03:10  98.1 F   51 L  18   162/80  96


 


 20 23:15  97.9 F   61  18   144/77  93 L


 


 20 21:11   65     


 


 20 21:04   65     


 


 20 21:00     20   


 


 20 20:45  97.7 F   63  20   178/84  97


 


 20 20:29  98.0 F  64   22  156/77   99


 


 20 18:18  98.1 F  60   22  170/96   100


 


 20 15:57   68     


 


 20 15:49   65     


 


 20 15:43  98.1 F      


 


 20 15:35   87   30 H  167/113   90 L








                                Intake and Output











 20





 22:59 06:59 14:59


 


Intake Total   360


 


Output Total 300 400 800


 


Balance -300 -400 -440


 


Intake:   


 


  Oral   360


 


Output:   


 


  Urine 300 400 800


 


Other:   


 


  Weight 83.915 kg 82.6 kg 82.6 kg











PHYSICAL EXAM:


VITAL SIGNS: As above


GENERAL: Sitting up in bed, no acute distress


HEENT:  Conjunctivae normal. eyes normal.  Oral mucosa moist


NECK:  No JVD. No thyroid enlargement. No LNs


CARDIOVASCULAR:  S1, S2 regular.. No murmur


RESPIRATION: Breath sounds diminished in the bases. No rhonchi, few crackles.  

Expiratory wheezing.


ABDOMEN:  Soft,  nontender . No guarding. no masses palpable. No ascites, No 

hepatosplenomegaly.Bowel sounds heard.


LEGS: Mild edema.


PSYCHIATRY: Alert and oriented X3, mood and affect normal.


NERVOUS SYSTEM:  Cranial N 2-12 grossly normal. Moves all 4 limbs.  Diffuse 

weakness No focal deficits.  Strength and sensation grossly intact..


Skin: no rash 


Lymphatic system. No LN neck axilla.











Results


CBC & Chem 7: 


                                 20 16:15





                                 20 16:15


Labs: 


                  Abnormal Lab Results - Last 24 Hours (Table)











  20 Range/Units





  16:15 16:15 16:15 


 


Lymphocytes #  0.4 L    (1.0-4.8)  k/uL


 


INR    1.2 H  (<1.2)  


 


BUN   18 H   (7-17)  mg/dL


 


Glucose   157 H   (74-99)  mg/dL


 


Alkaline Phosphatase   143 H   ()  U/L


 


Ur Leukocyte Esterase     (Negative)  


 


Urine Bacteria     (None)  /hpf


 


Hyaline Casts     (0-2)  /lpf


 


Urine Mucus     (None)  /hpf














  20 Range/Units





  17:00 


 


Lymphocytes #   (1.0-4.8)  k/uL


 


INR   (<1.2)  


 


BUN   (7-17)  mg/dL


 


Glucose   (74-99)  mg/dL


 


Alkaline Phosphatase   ()  U/L


 


Ur Leukocyte Esterase  Trace H  (Negative)  


 


Urine Bacteria  Rare H  (None)  /hpf


 


Hyaline Casts  9 H  (0-2)  /lpf


 


Urine Mucus  Rare H  (None)  /hpf














Thrombosis Risk Factor Assmnt





- Choose All That Apply


Any of the Below Risk Factors Present?: Yes


Each Factor Represents 1 point: Abnormal pulmonary function (COPD), Medical pt 

on bed rest, Obesity (BMI >25), Serious lung disease incl. pneumonia (< 1month),

Swollen legs (current), Varicose veins


Other Risk Factors: Yes


Each Risk Factor Represents 3 Points: Age 75 years or older


Other congenital or acquired thrombophilia - If yes, enter type in comment: No


Thrombosis Risk Factor Assessment Total Risk Factor Score: 9


Thrombosis Risk Factor Assessment Level: High Risk





Assessment and Plan


Assessment: 


Acute hypoxic respiratory failure secondary to acute COPD exacerbation, acute 

CHF-EF unknown


Acute on chronic CHF, EF unknown


Chronic atrial fibrillation on Xarelto


Gastroesophageal reflux disease


History of GI bleed hyperlipidemia attention to assess leg syndrome


Anxiety 


Former nicotine dependence

















Plan: Continue on current medication regime ,monitoring and symptomatic 

treatment.  Echo ordered.  Pulmonary, cardiology consulted.  Maintain nebulized 

bronchodilators, steroids, IV diuretics.  Home meds have been reviewed and 

resumed accordingly.  GI and DVT prophylaxis in place.














The impression and plan of care has been dictated as directed.





:


I performed a history and examination of this patient,  discussed the same with 

the dictator.  I agree with the dictator's note ,documented as a scribe.  Any 

additional findings or plans will be noted.

## 2020-01-17 NOTE — P.CNPUL
History of Present Illness


Consult date: 20


Reason for consult: dyspnea, cough


Chief complaint: Shortness of breath, cough, wheezing


History of present illness: 


 87-year-old female patient of Dr. Patel with past medical history of chronic 

congestive heart failure, chronic kidney disease, chronic A. fib on Xarelto, 

COPD, former history of smoking, who has been noticing gradual worsening of her 

dyspnea over several months.  She states she has been experiencing decreased 

exercise capacity, she would walk from her car to her apartment about 50 feet 

and she would have to sit down and rest, she denied any chest pain.  Last couple

of days she has been experiencing significant worsening shortness of breath, 

orthopnea, patient has been coughing more so at night.  Was increased wheezing, 

she is not normally on oxygen, has never seen a pulmonary specialist, patient is

on Spiriva for maintenance inhaler.  Denied any swelling in her lower 

extremities, denied any fever or chills, denied any chest pain, denied any 

purulent sputum production.  She states she had been evaluated by Dr. VC Ivory 

in the past for heart disease.  Her chest x-ray was completely showed no 

definite acute radiographic process.  Labs were completed and CBC was 

unremarkable, INR is 1.2, electrolytes were within normal limits, BUN is 18 

creatinine 0.90, Shayne ALT were within normal limits, alkaline phosphatase is 

143, troponin was 0.014, proBNP is 3600, urinalysis showed trace glucose, and 

rare bacteria, no significant sign of infection.  Patient was started on 

diuretics, breathing treatments, and were consulted for acute COPD exacerbation








Review of Systems


All systems: negative


Constitutional: Denies chills, Denies fever


Eyes: denies blurred vision, denies pain


Ears, nose, mouth and throat: Denies headache, Denies sore throat


Cardiovascular: Reports decreased exercise tolerance, Reports dyspnea on 

exertion, Denies chest pain, Denies shortness of breath


Respiratory: Reports dyspnea, Reports wheezing, Denies cough


Gastrointestinal: Denies abdominal pain, Denies diarrhea, Denies nausea, Denies 

vomiting


Genitourinary: Denies dysuria, Denies hematuria


Musculoskeletal: Denies myalgias


Integumentary: Denies pruritus, Denies rash


Neurological: Denies numbness, Denies weakness


Psychiatric: Denies anxiety, Denies depression


Endocrine: Denies fatigue, Denies weight change





Past Medical History


Past Medical History: Atrial Fibrillation, Heart Failure, COPD, GERD/Reflux, GI 

Bleed, Hyperlipidemia, Hypertension


Additional Past Medical History / Comment(s): restless leg syndrome


History of Any Multi-Drug Resistant Organisms: None Reported


Past Surgical History: Appendectomy, Cholecystectomy, Hysterectomy, Joint 

Replacement, Orthopedic Surgery


Additional Past Surgical History / Comment(s): cataracts removed, bilateral 

knees


Past Anesthesia/Blood Transfusion Reactions: No Reported Reaction


Past Psychological History: Anxiety


Smoking Status: Former smoker


Past Alcohol Use History: None Reported


Past Drug Use History: None Reported





- Past Family History


  ** Father


Family Medical History: Coronary Artery Disease (CAD)


Additional Family Medical History / Comment(s): rhemuatic fever,  at 59 of 

"bad heart"





  ** Mother


Family Medical History: Cancer


Additional Family Medical History / Comment(s): uterus





Medications and Allergies


                                Home Medications











 Medication  Instructions  Recorded  Confirmed  Type


 


LORazepam [Ativan] 0.5 mg PO HS PRN 08/02/15 01/16/20 History


 


Omeprazole [PriLOSEC] 20 mg PO AC-BRKFST 08/02/15 01/16/20 History


 


Pravastatin Sodium [Pravachol] 40 mg PO HS 08/02/15 01/16/20 History


 


Rivaroxaban [Xarelto] 15 mg PO HS 08/02/15 01/16/20 History


 


Tiotropium 18 Mcg/Puff [Spiriva] 2 cap INHALATION RT-DAILY 08/02/15 01/16/20 

History


 


rOPINIRole HCL [Requip] 1 mg PO HS 08/02/15 01/16/20 History


 


Cyanocobalamin (Vitamin B-12) 1,000 mcg PO DAILY 19 History





[Vitamin B-12]    


 


Donepezil HCl [Aricept] 10 mg PO DAILY 19 History


 


Furosemide [Lasix] 20 mg PO DAILY 19 History


 


Latanoprost Ophth [Xalatan 0.005%] 1 drops LEFT EYE HS 19 History


 


Levothyroxine Sodium [Synthroid] 25 mcg PO DAILY 19 History


 


Lisinopril [Zestril] 10 mg PO HS 19 History


 


Spironolactone [Aldactone] 25 mg PO DAILY 19 History


 


Vit C/E/Zn/Coppr/Lutein/Zeaxan 1 cap PO DAILY 19 History





[Preservision Areds 2 Softgel]    








                                    Allergies











Allergy/AdvReac Type Severity Reaction Status Date / Time


 


Penicillins Allergy  Rash/Hives Verified 20 18:45


 


sulfamethoxazole Allergy  Rash/Hives Verified 20 18:45





[From Bactrim]     


 


trimethoprim [From Bactrim] Allergy  Rash/Hives Verified 20 18:45














Physical Exam


Vitals: 


                                   Vital Signs











  Temp Pulse Pulse Resp BP BP Pulse Ox


 


 20 11:15   70     


 


 20 11:03   76     


 


 20 08:00  97.7 F   72  18   161/77  96


 


 20 07:14   72     


 


 20 07:04   68      98


 


 20 03:10  98.1 F   51 L  18   162/80  96


 


 20 23:15  97.9 F   61  18   144/77  93 L


 


 20 21:11   65     


 


 20 21:04   65     


 


 20 21:00     20   


 


 20 20:45  97.7 F   63  20   178/84  97


 


 20 20:29  98.0 F  64   22  156/77   99


 


 20 18:18  98.1 F  60   22  170/96   100


 


 20 15:57   68     


 


 20 15:49   65     


 


 20 15:43  98.1 F      


 


 20 15:35   87   30 H  167/113   90 L








                                Intake and Output











 20





 22:59 06:59 14:59


 


Intake Total   360


 


Output Total 300 400 800


 


Balance -300 -400 -440


 


Intake:   


 


  Oral   360


 


Output:   


 


  Urine 300 400 800


 


Other:   


 


  Weight 83.915 kg 82.6 kg 82.6 kg











 GENERAL EXAM: Alert, very pleasant, 87-year-old white female, on 2 L of oxygen 

with a pulse ox of 96%, audibly wheezy, and mildly dyspneic, comfortable in no 

apparent distress.


HEAD: Normocephalic/atraumatic.


EYES: Normal reaction of pupils, equal size.  Conjunctiva pink, sclera white.


NOSE: Clear with pink turbinates.


THROAT: No erythema or exudates.


NECK: No masses, no JVD, no thyroid enlargement, no adenopathy.


CHEST: No chest wall deformity.  Symmetrical expansion. 


LUNGS: Equal air entry with diffuse scattered wheezes


CVS: Regular rate and rhythm, normal S1 and S2, no gallops, no murmurs, no rubs


ABDOMEN: Soft, nontender.  No hepatosplenomegaly, normal bowel sounds, no 

guarding or rigidity.


EXTREMITIES: No clubbing, no edema, no cyanosis, 2+ pulses and upper and lower 

extremities.


MUSCULOSKELETAL: Muscle strength and tone normal.


SPINE: No scoliosis or deformity


SKIN: No rashes


CENTRAL NERVOUS SYSTEM: Alert and oriented -3.  No focal deficits, tone is 

normal in all 4 extremities.


PSYCHIATRIC: Alert and oriented -3.  Appropriate affect.  Intact judgment and 

insight.











Results





- Laboratory Findings


CBC and BMP: 


                                 20 16:15





                                 20 16:15


PT/INR, D-dimer











PT  11.7 sec (9.0-12.0)   20  16:15    


 


INR  1.2  (<1.2)  H  20  16:15    








Abnormal lab findings: 


                                  Abnormal Labs











  20





  16:15 16:15 16:15


 


Lymphocytes #  0.4 L  


 


INR    1.2 H


 


BUN   18 H 


 


Glucose   157 H 


 


Alkaline Phosphatase   143 H 


 


Ur Leukocyte Esterase   


 


Urine Bacteria   


 


Hyaline Casts   


 


Urine Mucus   














  20





  17:00


 


Lymphocytes # 


 


INR 


 


BUN 


 


Glucose 


 


Alkaline Phosphatase 


 


Ur Leukocyte Esterase  Trace H


 


Urine Bacteria  Rare H


 


Hyaline Casts  9 H


 


Urine Mucus  Rare H














- Diagnostic Findings


Chest x-ray: report reviewed, image reviewed





Assessment and Plan


Plan: 


 Assessment:





#1.  Acute hypoxic respiratory failure related to acute exacerbation of COPD





#2.  Acute exacerbation of CHF with unknown EF





#3.  Hypertension





#4.  Atrial fibrillation on anticoagulation with Xarelto





#5.  GERD/reflux





#6.  COPD





#7.  Hyperlipidemia





#8.  Previous history of GI bleeding





#9.  History of smoking, in remission for last 40 years, does carry 35-pack-year

smoking history





Plan:





Add IV steroids, switch the nebulized Atrovent to DuoNeb, Pulmicort, 

Perforomist, patient is receiving IV diuretics, cardiology is following, 

echocardiogram is pending, we'll continue to follow





I performed a history & physical examination of the patient and discussed their 

management with my nurse practitioner, Katrin Cervantes.  I reviewed the nurse 

practitioner's note and agree with the documented findings and plan of care.  

Lung sounds are positive for diffuse wheezes throughout the lung fields.  The 

findings and the impression was discussed with the patient.  I attest to the 

documentation by the nurse practitioner. 





Time with Patient: Greater than 30

## 2020-01-18 LAB
ANION GAP SERPL CALC-SCNC: 9 MMOL/L
BASOPHILS # BLD AUTO: 0 K/UL (ref 0–0.2)
BASOPHILS NFR BLD AUTO: 0 %
BUN SERPL-SCNC: 25 MG/DL (ref 7–17)
CALCIUM SPEC-MCNC: 9.3 MG/DL (ref 8.4–10.2)
CHLORIDE SERPL-SCNC: 97 MMOL/L (ref 98–107)
CO2 SERPL-SCNC: 31 MMOL/L (ref 22–30)
EOSINOPHIL # BLD AUTO: 0 K/UL (ref 0–0.7)
EOSINOPHIL NFR BLD AUTO: 1 %
ERYTHROCYTE [DISTWIDTH] IN BLOOD BY AUTOMATED COUNT: 4.27 M/UL (ref 3.8–5.4)
ERYTHROCYTE [DISTWIDTH] IN BLOOD: 12.2 % (ref 11.5–15.5)
GLUCOSE BLD-MCNC: 122 MG/DL (ref 75–99)
GLUCOSE BLD-MCNC: 143 MG/DL (ref 75–99)
GLUCOSE BLD-MCNC: 156 MG/DL (ref 75–99)
GLUCOSE BLD-MCNC: 158 MG/DL (ref 75–99)
GLUCOSE SERPL-MCNC: 149 MG/DL (ref 74–99)
HCT VFR BLD AUTO: 37.6 % (ref 34–46)
HGB BLD-MCNC: 12.8 GM/DL (ref 11.4–16)
LYMPHOCYTES # SPEC AUTO: 0.3 K/UL (ref 1–4.8)
LYMPHOCYTES NFR SPEC AUTO: 5 %
MCH RBC QN AUTO: 30 PG (ref 25–35)
MCHC RBC AUTO-ENTMCNC: 34 G/DL (ref 31–37)
MCV RBC AUTO: 88.1 FL (ref 80–100)
MONOCYTES # BLD AUTO: 0.1 K/UL (ref 0–1)
MONOCYTES NFR BLD AUTO: 3 %
NEUTROPHILS # BLD AUTO: 4.8 K/UL (ref 1.3–7.7)
NEUTROPHILS NFR BLD AUTO: 91 %
PLATELET # BLD AUTO: 160 K/UL (ref 150–450)
POTASSIUM SERPL-SCNC: 4.2 MMOL/L (ref 3.5–5.1)
SODIUM SERPL-SCNC: 137 MMOL/L (ref 137–145)
WBC # BLD AUTO: 5.2 K/UL (ref 3.8–10.6)

## 2020-01-18 RX ADMIN — DONEPEZIL HYDROCHLORIDE SCH MG: 10 TABLET ORAL at 08:40

## 2020-01-18 RX ADMIN — BUDESONIDE SCH MG: 1 SUSPENSION RESPIRATORY (INHALATION) at 18:56

## 2020-01-18 RX ADMIN — LEVOTHYROXINE SODIUM SCH MCG: 25 TABLET ORAL at 06:04

## 2020-01-18 RX ADMIN — LATANOPROST SCH DROPS: 50 SOLUTION OPHTHALMIC at 20:34

## 2020-01-18 RX ADMIN — METHYLPREDNISOLONE SODIUM SUCCINATE SCH MG: 40 INJECTION, POWDER, FOR SOLUTION INTRAMUSCULAR; INTRAVENOUS at 17:10

## 2020-01-18 RX ADMIN — SPIRONOLACTONE SCH MG: 25 TABLET, FILM COATED ORAL at 08:40

## 2020-01-18 RX ADMIN — INSULIN ASPART SCH: 100 INJECTION, SOLUTION INTRAVENOUS; SUBCUTANEOUS at 06:25

## 2020-01-18 RX ADMIN — FUROSEMIDE SCH MG: 10 INJECTION, SOLUTION INTRAMUSCULAR; INTRAVENOUS at 06:04

## 2020-01-18 RX ADMIN — ACETAMINOPHEN PRN MG: 500 TABLET ORAL at 18:19

## 2020-01-18 RX ADMIN — IPRATROPIUM BROMIDE AND ALBUTEROL SULFATE SCH ML: .5; 3 SOLUTION RESPIRATORY (INHALATION) at 18:56

## 2020-01-18 RX ADMIN — INSULIN ASPART SCH UNIT: 100 INJECTION, SOLUTION INTRAVENOUS; SUBCUTANEOUS at 20:34

## 2020-01-18 RX ADMIN — FORMOTEROL FUMARATE DIHYDRATE SCH MCG: 20 SOLUTION RESPIRATORY (INHALATION) at 18:56

## 2020-01-18 RX ADMIN — IPRATROPIUM BROMIDE AND ALBUTEROL SULFATE SCH ML: .5; 3 SOLUTION RESPIRATORY (INHALATION) at 15:15

## 2020-01-18 RX ADMIN — RIVAROXABAN SCH MG: 15 TABLET, FILM COATED ORAL at 20:33

## 2020-01-18 RX ADMIN — IPRATROPIUM BROMIDE AND ALBUTEROL SULFATE SCH ML: .5; 3 SOLUTION RESPIRATORY (INHALATION) at 11:26

## 2020-01-18 RX ADMIN — INSULIN ASPART SCH UNIT: 100 INJECTION, SOLUTION INTRAVENOUS; SUBCUTANEOUS at 17:10

## 2020-01-18 RX ADMIN — INSULIN ASPART SCH: 100 INJECTION, SOLUTION INTRAVENOUS; SUBCUTANEOUS at 11:40

## 2020-01-18 RX ADMIN — IPRATROPIUM BROMIDE AND ALBUTEROL SULFATE SCH ML: .5; 3 SOLUTION RESPIRATORY (INHALATION) at 08:17

## 2020-01-18 RX ADMIN — METHYLPREDNISOLONE SODIUM SUCCINATE SCH MG: 40 INJECTION, POWDER, FOR SOLUTION INTRAMUSCULAR; INTRAVENOUS at 08:40

## 2020-01-18 RX ADMIN — BUDESONIDE SCH MG: 1 SUSPENSION RESPIRATORY (INHALATION) at 08:17

## 2020-01-18 RX ADMIN — GUAIFENESIN PRN MG: 200 SOLUTION ORAL at 23:29

## 2020-01-18 RX ADMIN — METHYLPREDNISOLONE SODIUM SUCCINATE SCH MG: 40 INJECTION, POWDER, FOR SOLUTION INTRAMUSCULAR; INTRAVENOUS at 23:15

## 2020-01-18 RX ADMIN — CYANOCOBALAMIN TAB 500 MCG SCH MCG: 500 TAB at 08:40

## 2020-01-18 RX ADMIN — LISINOPRIL SCH MG: 10 TABLET ORAL at 20:33

## 2020-01-18 RX ADMIN — FORMOTEROL FUMARATE DIHYDRATE SCH MCG: 20 SOLUTION RESPIRATORY (INHALATION) at 08:17

## 2020-01-18 RX ADMIN — PRAVASTATIN SODIUM SCH MG: 40 TABLET ORAL at 20:33

## 2020-01-18 RX ADMIN — PANTOPRAZOLE SODIUM SCH MG: 40 TABLET, DELAYED RELEASE ORAL at 06:04

## 2020-01-18 NOTE — PN
PROGRESS NOTE



I am covering for Dr. Patel.



DATE OF SERVICE:

01/18/2020



This 87-year-old woman with a past medical history of CHF was admitted with shortness

of breath.  The patient had mostly a combination of shortness of breath, possible COPD

and CHF acute exacerbation.  Patient is being closely monitored at this time.  The

patient is on IV Lasix on a daily basis.  The patient is being closely monitored by

Cardiology at this time.



PAST MEDICAL HISTORY:

Reviewed.



REVIEW OF SYSTEMS:

CARDIOVASCULAR SYSTEM: As mentioned earlier.

RESPIRATORY SYSTEM: As mentioned earlier.

GI: No nausea.

:  No dysuria.

NERVOUS SYSTEM: No numbness or weakness.



MEDICATIONS:

Current medications are reviewed and include:

1. DuoNeb q.i.d. and p.r.n.

2. Pulmicort b.i.d.

3. Aricept daily.

4. Perforomist 20 mcg b.i.d.

5. Lasix 40 mg IV daily.

6. Synthroid 25 mcg.

7. Zestril.

8. Ativan.

9. Solu-Medrol 40 IV q.8.

10.Protonix.

11.Pravachol.

12.Xarelto.

13.Requip.

14.Aldactone.

Doses reviewed.



PHYSICAL EXAMINATION:

The patient is alert and oriented x3.  Pulse is 86, blood pressure 123/88, respiration

20, temperature 97.4, pulse ox 95% on 2 L.

HEENT:  Conjunctivae normal.

NECK: No JVD.

CARDIOVASCULAR: S1, S2 muffled.

RESPIRATORY: Breath sounds diminished at the bases. A few scattered rhonchi and

crackles.  Expiratory wheezing also present.

ABDOMEN:  Soft, obese, nontender.  No mass palpable.

LEGS: No edema, no swelling.

NERVOUS SYSTEM: Higher functions as mentioned earlier. Moves all 4 limbs. No focal

deficits.

LYMPHATICS:  No lymphadenopathy of the neck, axillae or groin.

SKIN:  No ulcer, rash or bleeding.

JOINTS: No active deforming arthropathy.



LABS:

Labs are WBC 5.2, hemoglobin 12.8. Sodium 137, potassium 4.2. Glucose 122.  Alkaline

phosphatase 143.



ASSESSMENT:

1. Shortness of breath, possibly combination of chronic obstructive pulmonary disease

    acute exacerbation as well as congestive heart failure acute exacerbation,

    ejection fraction unknown.

2. Acute hypoxic respiratory failure, present on admission, secondary to above.

3. Hypertension.

4. Atrial fibrillation, rate controlled, chronic, persistent.

5. Anticoagulation with Xarelto.

6. History of gastroesophageal reflux disease.

7. History of gastrointestinal bleed.

8. Hyperlipidemia.

9. History of restless legs.

10.History of cholecystectomy.

11.History of degenerative joint disease.

12.History of anxiety.

13.History of nicotine dependence.



RECOMMENDATIONS AND DISCUSSION:

In this 87-year-old woman who presented with multiple complex medical issues, we will

monitor the patient closely. Continue the current medications.  Continue with

bronchodilators.  Continue steroids.  Continue with diuretics.  Recommend flu testing.

Otherwise, continue rest of the medications.  Closely follow with Cardiology and

Pulmonology.  Guarded prognosis.  Further recommendations to follow.





MMODL / IJN: [id number] / Job#: [id number]

## 2020-01-18 NOTE — P.CRDCN
History of Present Illness


Consult date: 20


Consult reason: congestive heart failure


History of present illness: 


History of present illness:


 This is an 87-year-old female patient of Dr. VC Ivory with past medical 

history of chronic congestive heart failure, hypertension, hyperlipidemia, 

chronic kidney disease, chronic A. fib on Xarelto, COPD, former history of 

smoking, gastroesophageal reflux disease, dementia.  Patient states she was 

followed with Dr. Ivory 5 weeks ago.  She denies any medication changes at that

time.  Patient complains of worsening of her dyspnea over several months along 

with shortness of breath with exertion, patient sleeping with 2 pillows.  She 

also complains of a wheezing.  Symptoms worsened over the last couple of days.  

Patient came into [facility] emergency [facility] for evaluation.  

Troponin was negative.  Creatinine 0.9, CBC unremarkable, INR 1.2, electrolytes 

within normal limits, alkaline phosphatase 143.  ProBNP 3600.  Urinalysis 

negative for urinary tract infection.  Chest x-ray showed no acute process.  EKG

was atrial fibrillation can troll the right.  Patient was started on IV Lasix 40

mg every 12 hours and Solu-Medrol, nebulizer treatments.  Patient has been seen 

by pulmonary medicine for COPD exacerbation as well.  Patient states that her 

breathing is improving, she still has increased shortness of breath with acti

vity.  Weight is down 3 kg. patient states that she does not weigh herself at 

home and she does not have a scale,  has provided her with a scale 

to take home at discharge.








Review Of Systems:


Constitutional: No fever, no chills, no night sweats.  No weight change.  

Reports weakness, reports fatigue, no lethargy.  No daytime sleepiness.


EENT: No headache.  No blurred vision or double vision, no loss of vision.  No 

loss of Hearing, no ringing in the ears, no dizziness.  No nasal drainage or 

congestion.  No epistaxis.  No sore throat.


Lungs: Reports shortness of breath, reports cough, no sputum production.  

Reports wheezing.


Cardiovascular: No chest pain, no lower extremity edema.  No palpitations.  No 

paroxysmal nocturnal dyspnea.  Reports orthopnea.  No lightheadedness or 

dizziness.  No syncopal episodes.


Abdominal: No abdominal pain.  No nausea, vomiting.  No diarrhea.  No 

constipation.  No bloody or tarry stools..  No loss of appetite.


Genitourinary: No dysuria, increased frequency, urgency.  No urinary retention.


Musculoskeletal: No myalgias.  No muscle weakness, no gait dysfunction, no 

frequent falls.  No back pain.  No neck pain.


Integumentary: No wounds, no lesions.  No rash or pruritus.  No unusual 

bruising.  No change in hair or nails.


Neurologic: No aphasia. No facial droop. No change in mentation. No head injury.

No headache. No paralysis. No paresthesia.








Physical exam: 


Gen: This is an 87-year-old  female.  Patient is seen sitting up in a 

chair eating breakfast and appears to be comfortable and in no acute distress.  

No respiratory distress is noted.


VS: Afebrile, heart rate 59, blood pressure 162/64, pulse ox 95% on 2 L nasal 

cannula


HEENT: Head is atraumatic, normocephalic. Pupils equal, round. Sclerae is 

anicteric. 


NECK: Supple. No JVD. No lymphadenopathy. No thyromegaly. 


LUNGS: Scattered wheezes and rhonchi.  No intercostal retractions.


HEART: Irregular irregular rate and rhythm. No murmur. 


ABDOMEN: Soft. Bowel sounds are present. No masses.  No tenderness.


EXTREMITIES: No pedal edema.  No calf tenderness.  Dorsalis pedis +1 

bilaterally.


NEUROLOGICAL: Patient is awake, alert and oriented x3. Cranial nerves 2 through 

12 are grossly intact. 





 





Assessment:


Acute hypoxic respiratory failure related to acute exacerbation of COPD


Acute on chronic heart failure, suspect systolic


Hypertension


Hyperlipidemia


Chronic trial fibrillation on anticoagulation with Xarelto


GERD 


Previous history of GI bleeding


History of smoking, quit 40 years ago, 35-pack-year smoking history











Plan:


IV Lasix will be decreased to 40 mg daily


Resume Aldactone 25 mg daily


Continue lisinopril 10 mg daily, Pravachol 40 mg daily, Xarelto 5 mg daily


Obtain 2-D echocardiogram and Doppler study to assess cardiac structure and 

function


Monitor I&O and daily weights


Monitor electrolytes and renal function


Thank you kindly for this consultation


On further conditions to follow based upon clinical course














Nurse practitioner note has been reviewed, I agree with documented findings and 

plan of care.  Patient was seen and examined.





Past Medical History


Past Medical History: Atrial Fibrillation, Heart Failure, COPD, GERD/Reflux, GI 

Bleed, Hyperlipidemia, Hypertension


Additional Past Medical History / Comment(s): restless leg syndrome


History of Any Multi-Drug Resistant Organisms: None Reported


Past Surgical History: Appendectomy, Cholecystectomy, Hysterectomy, Joint 

Replacement, Orthopedic Surgery


Additional Past Surgical History / Comment(s): cataracts removed, bilateral 

knees


Past Anesthesia/Blood Transfusion Reactions: No Reported Reaction


Past Psychological History: Anxiety


Smoking Status: Former smoker


Past Alcohol Use History: None Reported


Past Drug Use History: None Reported





- Past Family History


  ** Father


Family Medical History: Coronary Artery Disease (CAD)


Additional Family Medical History / Comment(s): rhemuatic fever,  at 59 of 

"bad heart"





  ** Mother


Family Medical History: Cancer


Additional Family Medical History / Comment(s): uterus





Medications and Allergies


                                Home Medications











 Medication  Instructions  Recorded  Confirmed  Type


 


LORazepam [Ativan] 0.5 mg PO HS PRN 08/02/15 01/16/20 History


 


Omeprazole [PriLOSEC] 20 mg PO AC-BRKFST 08/02/15 01/16/20 History


 


Pravastatin Sodium [Pravachol] 40 mg PO HS 08/02/15 01/16/20 History


 


Rivaroxaban [Xarelto] 15 mg PO HS 08/02/15 01/16/20 History


 


Tiotropium 18 Mcg/Puff [Spiriva] 2 cap INHALATION RT-DAILY 08/02/15 01/16/20 

History


 


rOPINIRole HCL [Requip] 1 mg PO HS 08/02/15 01/16/20 History


 


Cyanocobalamin (Vitamin B-12) 1,000 mcg PO DAILY 19 History





[Vitamin B-12]    


 


Donepezil HCl [Aricept] 10 mg PO DAILY 19 History


 


Furosemide [Lasix] 20 mg PO DAILY 19 History


 


Latanoprost Ophth [Xalatan 0.005%] 1 drops LEFT EYE HS 19 History


 


Levothyroxine Sodium [Synthroid] 25 mcg PO DAILY 19 History


 


Lisinopril [Zestril] 10 mg PO HS 19 History


 


Spironolactone [Aldactone] 25 mg PO DAILY 19 History


 


Vit C/E/Zn/Coppr/Lutein/Zeaxan 1 cap PO DAILY 19 History





[Preservision Areds 2 Softgel]    








                                    Allergies











Allergy/AdvReac Type Severity Reaction Status Date / Time


 


Penicillins Allergy  Rash/Hives Verified 20 18:45


 


sulfamethoxazole Allergy  Rash/Hives Verified 20 18:45





[From Bactrim]     


 


trimethoprim [From Bactrim] Allergy  Rash/Hives Verified 20 18:45














Physical Exam


Vitals: 


                                   Vital Signs











  Temp Pulse Pulse Resp BP Pulse Ox


 


 20 03:10  98.7 F   59 L  18  162/64  95


 


 20 23:20  98.2 F   73  18  115/57  92 L


 


 20 20:44   72    


 


 20 20:32   72    


 


 20 20:31   72    


 


 20 20:17   60     95


 


 20 19:30  98.3 F   65  18  143/62  94 L


 


 20 16:32   78    


 


 20 16:23   74    


 


 20 15:28  96.9 F L   72  18  171/82  96


 


 20 12:00  97.7 F   59 L  18  161/74  100


 


 20 11:15   70    


 


 20 11:03   76    


 


 20 08:00  97.7 F   72  18  161/77  96








                                Intake and Output











 20





 22:59 06:59 14:59


 


Intake Total 360  


 


Output Total 1960 300 


 


Balance -1600 -300 


 


Intake:   


 


  Oral 360  


 


Output:   


 


  Urine 1960 300 


 


Other:   


 


  # Voids 1  


 


  Weight  80.9 kg 














Results





                                 20 06:26





                                 20 06:26


                                       CBC











  20 Range/Units





  06:26 


 


WBC  5.2  (3.8-10.6)  k/uL


 


RBC  4.27  (3.80-5.40)  m/uL


 


Hgb  12.8  (11.4-16.0)  gm/dL


 


Hct  37.6  (34.0-46.0)  %


 


Plt Count  160  (150-450)  k/uL








                          Comprehensive Metabolic Panel











  20 Range/Units





  06:26 


 


Sodium  137  (137-145)  mmol/L


 


Potassium  4.2  (3.5-5.1)  mmol/L


 


Chloride  97 L  ()  mmol/L


 


Carbon Dioxide  31 H  (22-30)  mmol/L


 


BUN  25 H  (7-17)  mg/dL


 


Creatinine  0.96  (0.52-1.04)  mg/dL


 


Glucose  149 H  (74-99)  mg/dL


 


Calcium  9.3  (8.4-10.2)  mg/dL








                               Current Medications











Generic Name Dose Route Start Last Admin





  Trade Name Freq  PRN Reason Stop Dose Admin


 


Albuterol/Ipratropium  3 ml  20 17:36  20 11:01





  Duoneb 0.5 Mg-3 Mg/3 Ml Soln  INHALATION   3 ml





  Q6H PRN   Administration





  Dyspnea  


 


Albuterol/Ipratropium  3 ml  20 12:00  20 20:17





  Duoneb 0.5 Mg-3 Mg/3 Ml Soln  INHALATION   3 ml





  RT-QID KAMRYN   Administration


 


Budesonide  1 mg  20 20:00  20 20:17





  Pulmicort  INHALATION   1 mg





  RT-BID KAMRYN   Administration


 


Cyanocobalamin  1,000 mcg  20 09:00  20 08:16





  Vitamin B-12  PO   1,000 mcg





  DAILY KAMRYN   Administration


 


Donepezil HCl  10 mg  20 21:30  20 08:16





  Aricept  PO   10 mg





  DAILY KAMRYN   Administration


 


Formoterol Fumarate  20 mcg  20 20:00  20 20:17





  Perforomist  INHALATION   20 mcg





  RT-BID KAMRYN   Administration


 


Furosemide  40 mg  20 06:00  20 06:04





  Lasix  IV   40 mg





  Q12H KAMRYN   Administration


 


Guaifenesin  200 mg  20 10:53  20 20:05





  Robitussin  PO   200 mg





  Q6H PRN   Administration





  Cough  


 


Insulin Aspart  0 unit  20 17:30  20 06:25





  Novolog  SQ   Not Given





  [facility]  





  Protocol  


 


Latanoprost  1 drops  20 21:00  20 20:04





  Xalatan 0.005%  LEFT EYE   1 drops





  HS KAMRYN   Administration


 


Levothyroxine Sodium  25 mcg  20 06:30  20 06:04





  Synthroid  PO   25 mcg





  0630 KAMRYN   Administration


 


Lisinopril  10 mg  20 21:00  01/17/20 20:04





  Zestril  PO   10 mg





  HS KAMRYN   Administration


 


Lorazepam  0.5 mg  20 21:19  20 21:41





  Ativan  PO   0.5 mg





  HS PRN   Administration





  Anxiety  


 


Methylprednisolone Sodium Succinate  40 mg  20 11:00  20 23:25





  Solu-Medrol  IV   40 mg





  Q8HR KAMRYN   Administration


 


Pantoprazole Sodium  40 mg  20 07:30  20 06:04





  Protonix  PO   40 mg





  AC-BRKFST KAMRYN   Administration


 


Pravastatin Sodium  40 mg  20 21:00  20 20:04





  Pravachol  PO   40 mg





  HS KAMRYN   Administration


 


Rivaroxaban  15 mg  20 21:00  20 20:04





  Xarelto  PO   15 mg





  HS KAMRYN   Administration


 


Ropinirole HCl  1 mg  20 21:30  20 20:04





  Requip  PO   1 mg





  HS KAMRYN   Administration








                                Intake and Output











 20





 22:59 06:59 14:59


 


Intake Total 360  


 


Output Total 1960 300 


 


Balance -1600 -300 


 


Intake:   


 


  Oral 360  


 


Output:   


 


  Urine  300 


 


Other:   


 


  # Voids 1  


 


  Weight  80.9 kg 








                                        





                                 20 06:26 





                                 20 06:26

## 2020-01-18 NOTE — P.PN
Subjective


Progress Note Date: 01/18/20


Principal diagnosis: 





Acute hypoxic respiratory failure secondary to an acute exacerbation of chronic 

obstructive pulmonary disease





 87-year-old female patient of Dr. Patel with past medical history of chronic 

congestive heart failure, chronic kidney disease, chronic A. fib on Xarelto, 

COPD, former history of smoking, who has been noticing gradual worsening of her 

dyspnea over several months.  She states she has been experiencing decreased 

exercise capacity, she would walk from her car to her apartment about 50 feet 

and she would have to sit down and rest, she denied any chest pain.  Last couple

of days she has been experiencing significant worsening shortness of breath, 

orthopnea, patient has been coughing more so at night.  Was increased wheezing, 

she is not normally on oxygen, has never seen a pulmonary specialist, patient is

on Spiriva for maintenance inhaler.  Denied any swelling in her lower 

extremities, denied any fever or chills, denied any chest pain, denied any 

purulent sputum production.  She states she had been evaluated by Dr. VC Ivory 

in the past for heart disease.  Her chest x-ray was completely showed no 

definite acute radiographic process.  Labs were completed and CBC was 

unremarkable, INR is 1.2, electrolytes were within normal limits, BUN is 18 

creatinine 0.90, Shayne ALT were within normal limits, alkaline phosphatase is 

143, troponin was 0.014, proBNP is 3600, urinalysis showed trace glucose, and 

rare bacteria, no significant sign of infection.  Patient was started on 

diuretics, breathing treatments, and were consulted for acute COPD exacerbation





The patient is seen today 01/18/2020 in follow-up on the selective care unit.  

Awake and alert in no acute distress.  Maintaining O2 saturations in the mid 90s

on 2 L/m per nasal cannula.  She's afebrile.  Hemodynamically stable.  White 

count 5.2.  Hemoglobin 12.8.  Sodium 137.  Potassium 4.2.  Bicarb 31.  

Creatinine 0.96.  She remains on DuoNeb inhalations, Perforomist and Pulmicort 

inhalations, IV Solu-Medrol.  She is also on IV diuretics and diuresing well.  

Anticoagulated with Xarelto.





Objective





- Vital Signs


Vital signs: 


                                   Vital Signs











Temp  97.4 F L  01/18/20 11:38


 


Pulse  86   01/18/20 11:38


 


Resp  20   01/18/20 11:38


 


BP  123/88   01/18/20 11:38


 


Pulse Ox  95   01/18/20 11:38








                                 Intake & Output











 01/17/20 01/18/20 01/18/20





 18:59 06:59 18:59


 


Intake Total 1090  240


 


Output Total 2360 700 400


 


Balance -1270 -700 -160


 


Weight 82.6 kg 80.9 kg 


 


Intake:   


 


  IV 10  


 


    0.9 10  


 


  Oral 1080  240


 


Output:   


 


  Urine 2360 700 400


 


Other:   


 


  Voiding Method   Toilet


 


  # Voids 1 1 1














- Exam





GENERAL EXAM: Alert, very pleasant, 87-year-old white female, on 2 L of oxygen 

with a pulse ox of 95%, audibly wheezy, and mildly dyspneic, comfortable in no 

apparent distress.


HEAD: Normocephalic/atraumatic.


EYES: Normal reaction of pupils, equal size.  Conjunctiva pink, sclera white.


NOSE: Clear with pink turbinates.


THROAT: No erythema or exudates.


NECK: No masses, no JVD, no thyroid enlargement, no adenopathy.


CHEST: No chest wall deformity.  Symmetrical expansion. 


LUNGS: Equal air entry with bilateral scattered wheezes


CVS: Regular rate and rhythm, normal S1 and S2, no gallops, no murmurs, no rubs


ABDOMEN: Soft, nontender.  No hepatosplenomegaly, normal bowel sounds, no 

guarding or rigidity.


EXTREMITIES: No clubbing, no edema, no cyanosis, 2+ pulses and upper and lower 

extremities.


MUSCULOSKELETAL: Muscle strength and tone normal.


SPINE: No scoliosis or deformity


SKIN: No rashes


CENTRAL NERVOUS SYSTEM:  No focal deficits, tone is normal in all 4 extremities.


PSYCHIATRIC: Alert and oriented -3.  Appropriate affect.  Intact judgment and 

insight.





- Labs


CBC & Chem 7: 


                                 01/18/20 06:26





                                 01/18/20 06:26


Labs: 


                  Abnormal Lab Results - Last 24 Hours (Table)











  01/17/20 01/17/20 01/18/20 Range/Units





  16:28 20:21 06:21 


 


Lymphocytes #     (1.0-4.8)  k/uL


 


Chloride     ()  mmol/L


 


Carbon Dioxide     (22-30)  mmol/L


 


BUN     (7-17)  mg/dL


 


Glucose     (74-99)  mg/dL


 


POC Glucose (mg/dL)  136 H  163 H  143 H  (75-99)  mg/dL














  01/18/20 01/18/20 01/18/20 Range/Units





  06:26 06:26 11:38 


 


Lymphocytes #  0.3 L    (1.0-4.8)  k/uL


 


Chloride   97 L   ()  mmol/L


 


Carbon Dioxide   31 H   (22-30)  mmol/L


 


BUN   25 H   (7-17)  mg/dL


 


Glucose   149 H   (74-99)  mg/dL


 


POC Glucose (mg/dL)    122 H  (75-99)  mg/dL














Assessment and Plan


Assessment: 





#1.  Acute hypoxic respiratory failure related to acute exacerbation of COPD





#2.  Acute exacerbation of CHF with unknown EF, remains on IV diuretics





#3.  Hypertension





#4.  Atrial fibrillation on anticoagulation with Xarelto





#5.  GERD/reflux





#6.  COPD





#7.  Hyperlipidemia





#8.  Previous history of GI bleeding





#9.  History of smoking, in remission for last 40 years, does carry 35-pack-year

smoking history





Plan:





The patient was seen and evaluated by Dr. Reyes.  She is a bit improved today 

compared to yesterday.  Continue the current treatment plan.  We will continue 

to follow.





I, the cosigning physician, performed a history & physical examination of the 

patient. Lungs sounds with bilateral end expiratory wheeze.  Maintaining good O2

saturations in the 90s on liters per minute per nasal cannula.  I discussed the 

assessment and plan of care with my nurse practitioner, Jennifer Velasquez. I attest to 

the above note as dictated by her.

## 2020-01-19 LAB
ANION GAP SERPL CALC-SCNC: 8 MMOL/L
BASOPHILS # BLD AUTO: 0 K/UL (ref 0–0.2)
BASOPHILS NFR BLD AUTO: 0 %
BUN SERPL-SCNC: 34 MG/DL (ref 7–17)
CALCIUM SPEC-MCNC: 8.9 MG/DL (ref 8.4–10.2)
CHLORIDE SERPL-SCNC: 95 MMOL/L (ref 98–107)
CO2 SERPL-SCNC: 31 MMOL/L (ref 22–30)
EOSINOPHIL # BLD AUTO: 0 K/UL (ref 0–0.7)
EOSINOPHIL NFR BLD AUTO: 0 %
ERYTHROCYTE [DISTWIDTH] IN BLOOD BY AUTOMATED COUNT: 4.26 M/UL (ref 3.8–5.4)
ERYTHROCYTE [DISTWIDTH] IN BLOOD: 12.2 % (ref 11.5–15.5)
GLUCOSE BLD-MCNC: 118 MG/DL (ref 75–99)
GLUCOSE BLD-MCNC: 120 MG/DL (ref 75–99)
GLUCOSE BLD-MCNC: 129 MG/DL (ref 75–99)
GLUCOSE BLD-MCNC: 158 MG/DL (ref 75–99)
GLUCOSE SERPL-MCNC: 144 MG/DL (ref 74–99)
HCT VFR BLD AUTO: 37.8 % (ref 34–46)
HGB BLD-MCNC: 12.4 GM/DL (ref 11.4–16)
LYMPHOCYTES # SPEC AUTO: 0.2 K/UL (ref 1–4.8)
LYMPHOCYTES NFR SPEC AUTO: 2 %
MCH RBC QN AUTO: 29.1 PG (ref 25–35)
MCHC RBC AUTO-ENTMCNC: 32.8 G/DL (ref 31–37)
MCV RBC AUTO: 88.7 FL (ref 80–100)
MONOCYTES # BLD AUTO: 0.3 K/UL (ref 0–1)
MONOCYTES NFR BLD AUTO: 3 %
NEUTROPHILS # BLD AUTO: 9.9 K/UL (ref 1.3–7.7)
NEUTROPHILS NFR BLD AUTO: 94 %
PLATELET # BLD AUTO: 162 K/UL (ref 150–450)
POTASSIUM SERPL-SCNC: 4.6 MMOL/L (ref 3.5–5.1)
SODIUM SERPL-SCNC: 134 MMOL/L (ref 137–145)
WBC # BLD AUTO: 10.5 K/UL (ref 3.8–10.6)

## 2020-01-19 RX ADMIN — IPRATROPIUM BROMIDE AND ALBUTEROL SULFATE SCH ML: .5; 3 SOLUTION RESPIRATORY (INHALATION) at 07:09

## 2020-01-19 RX ADMIN — FORMOTEROL FUMARATE DIHYDRATE SCH MCG: 20 SOLUTION RESPIRATORY (INHALATION) at 19:32

## 2020-01-19 RX ADMIN — BUDESONIDE SCH MG: 1 SUSPENSION RESPIRATORY (INHALATION) at 19:32

## 2020-01-19 RX ADMIN — METHYLPREDNISOLONE SODIUM SUCCINATE SCH MG: 40 INJECTION, POWDER, FOR SOLUTION INTRAMUSCULAR; INTRAVENOUS at 08:49

## 2020-01-19 RX ADMIN — LATANOPROST SCH DROPS: 50 SOLUTION OPHTHALMIC at 20:06

## 2020-01-19 RX ADMIN — LISINOPRIL SCH MG: 10 TABLET ORAL at 20:07

## 2020-01-19 RX ADMIN — METHYLPREDNISOLONE SODIUM SUCCINATE SCH MG: 40 INJECTION, POWDER, FOR SOLUTION INTRAMUSCULAR; INTRAVENOUS at 16:43

## 2020-01-19 RX ADMIN — IPRATROPIUM BROMIDE AND ALBUTEROL SULFATE SCH ML: .5; 3 SOLUTION RESPIRATORY (INHALATION) at 15:38

## 2020-01-19 RX ADMIN — FUROSEMIDE SCH MG: 10 INJECTION, SOLUTION INTRAMUSCULAR; INTRAVENOUS at 08:50

## 2020-01-19 RX ADMIN — FORMOTEROL FUMARATE DIHYDRATE SCH MCG: 20 SOLUTION RESPIRATORY (INHALATION) at 07:09

## 2020-01-19 RX ADMIN — BUDESONIDE SCH MG: 1 SUSPENSION RESPIRATORY (INHALATION) at 07:09

## 2020-01-19 RX ADMIN — DONEPEZIL HYDROCHLORIDE SCH MG: 10 TABLET ORAL at 08:49

## 2020-01-19 RX ADMIN — INSULIN ASPART SCH: 100 INJECTION, SOLUTION INTRAVENOUS; SUBCUTANEOUS at 16:54

## 2020-01-19 RX ADMIN — INSULIN ASPART SCH UNIT: 100 INJECTION, SOLUTION INTRAVENOUS; SUBCUTANEOUS at 20:07

## 2020-01-19 RX ADMIN — RIVAROXABAN SCH MG: 15 TABLET, FILM COATED ORAL at 20:07

## 2020-01-19 RX ADMIN — LEVOTHYROXINE SODIUM SCH MCG: 25 TABLET ORAL at 06:03

## 2020-01-19 RX ADMIN — IPRATROPIUM BROMIDE AND ALBUTEROL SULFATE SCH ML: .5; 3 SOLUTION RESPIRATORY (INHALATION) at 19:34

## 2020-01-19 RX ADMIN — SPIRONOLACTONE SCH MG: 25 TABLET, FILM COATED ORAL at 08:49

## 2020-01-19 RX ADMIN — METHYLPREDNISOLONE SODIUM SUCCINATE SCH MG: 40 INJECTION, POWDER, FOR SOLUTION INTRAMUSCULAR; INTRAVENOUS at 23:01

## 2020-01-19 RX ADMIN — ACETAMINOPHEN PRN MG: 500 TABLET ORAL at 08:49

## 2020-01-19 RX ADMIN — PRAVASTATIN SODIUM SCH MG: 40 TABLET ORAL at 20:07

## 2020-01-19 RX ADMIN — IPRATROPIUM BROMIDE AND ALBUTEROL SULFATE SCH ML: .5; 3 SOLUTION RESPIRATORY (INHALATION) at 11:48

## 2020-01-19 RX ADMIN — IPRATROPIUM BROMIDE AND ALBUTEROL SULFATE SCH: .5; 3 SOLUTION RESPIRATORY (INHALATION) at 15:23

## 2020-01-19 RX ADMIN — INSULIN ASPART SCH: 100 INJECTION, SOLUTION INTRAVENOUS; SUBCUTANEOUS at 05:58

## 2020-01-19 RX ADMIN — INSULIN ASPART SCH: 100 INJECTION, SOLUTION INTRAVENOUS; SUBCUTANEOUS at 11:59

## 2020-01-19 RX ADMIN — PANTOPRAZOLE SODIUM SCH MG: 40 TABLET, DELAYED RELEASE ORAL at 06:03

## 2020-01-19 RX ADMIN — CYANOCOBALAMIN TAB 500 MCG SCH MCG: 500 TAB at 08:49

## 2020-01-19 NOTE — ECHOF
Please Approve or Refuse. Next Visit Date:  Visit date not found    Hemoglobin A1C (%)   Date Value   04/09/2015 5.4   10/14/2014 6.0   05/01/2013 5.3             ( goal A1C is < 7)   No results found for: LABMICR  No results found for: LDLCHOLESTEROL, LDLCALC    (goal LDL is <100)   AST (U/L)   Date Value   05/02/2017 16     ALT (U/L)   Date Value   05/02/2017 20     BUN (mg/dL)   Date Value   05/02/2017 11     BP Readings from Last 3 Encounters:   12/01/17 120/81   11/16/17 116/70   10/03/17 122/70          (goal 120/80)        Patient Active Problem List:     HPV (human papilloma virus) infection     Dysmenorrhea     Smoker     Ovarian cyst, follicular     S/P GONZALO, [place], appendectomy 7/7/14     Bipolar II disorder ([facility] [address]     Anxiety     Prediabetes     Obesity (BMI 30.0-34. 9)     Chronic neck pain     Menopausal hot flushes     Acute bronchitis due to infection     Cyst of left kidney     Insomnia     IFG (impaired fasting glucose)     Fatigue     History of total abdominal hysterectomy     Chronic bilateral low back pain without sciatica     Hematuria     Proteinuria     Abnormal tympanic membrane of left ear     Headache disorder     Smokes and motivated to quit     Exposure to hepatitis C     Acute exacerbation of chronic low back pain     Bronchitis, acute, with bronchospasm     Acute non-recurrent maxillary sinusitis Referral Reason:lv fx



MEASUREMENTS

--------

HEIGHT: 165.1 cm

WEIGHT: 80.7 kg

BP: 

RVIDd:   3.8 cm     (< 3.3)

IVSd:   1.6 cm     (0.6 - 1.1)

LVIDd:   4.0 cm     (3.9 - 5.3)

LVPWd:   1.3 cm     (0.6 - 1.1)

IVSs:   1.5 cm

LVIDs:   2.6 cm

LVPWs:   2.0 cm

LA Diam:   6.4 cm     (2.7 - 3.8)

LAESV Index (A-L):   63.62 ml/m

Ao Diam:   2.7 cm     (2.0 - 3.7)

AV Cusp:   1.2 cm     (1.5 - 2.6)

LA Diam:   7.1 cm     (2.7 - 3.8)

MV EXCURSION:   22.126 mm     (> 18.000)

MV EF SLOPE:   158 mm/s     (70 - 150)

RAP:   15.00 mmHg

RVSP:   54.45 mmHg







FINDINGS

--------

Atrial fibrillation.

This was a technically good study.

The left ventricular size is normal.   There is mild concentric left ventricular hypertrophy.   Overa
ll left ventricular systolic function is low-normal with, an EF between 50 - 55 %.   Left ventricular
 fillimg pressure cannot be estimated due to Atrial fibrillation.

The right ventricle is normal in size.

The left atrium is markedly dilated.   LA is severely dilated >40 ml/m2

The right atrial size is normal.

There is mild aortic valve sclerosis.   Trace amount of aortic regurgitation.

The mitral valve leaflets are mildly thickened.   Mild mitral annular calcification present.   Modera
te mitral regurgitation is present.   An echodensity attched to the LA side of the anterior mitral le
aflet was seen.

Mild tricuspid regurgitation present.   There is moderate pulmonary hypertension.   The right ventric
ular systolic pressure, as measured by Doppler, is 54.45mmHg.

Trace/mild (physiologic)  pulmonic regurgitation.

The aortic root size is normal.

There is no pericardial effusion.



CONCLUSIONS

--------

1. Atrial fibrillation.

2. This was a technically good study.

3. The left ventricular size is normal.

4. There is mild concentric left ventricular hypertrophy.

5. Overall left ventricular systolic function is low-normal with, an EF between 50 - 55 %.

6. Left ventricular fillimg pressure cannot be estimated due to Atrial fibrillation.

7. The right ventricle is normal in size.

8. The left atrium is markedly dilated.

9. LA is severely dilated >40 ml/m2

10. The right atrial size is normal.

11. There is mild aortic valve sclerosis.

12. Trace amount of aortic regurgitation.

13. The mitral valve leaflets are mildly thickened.

14. Mild mitral annular calcification present.

15. Moderate mitral regurgitation is present.

16. An echodensity attched to the LA side of the anterior mitral leaflet was seen.

17. Mild tricuspid regurgitation present.

18. There is moderate pulmonary hypertension.

19. The right ventricular systolic pressure, as measured by Doppler, is 54.45mmHg.

20. Trace/mild (physiologic)  pulmonic regurgitation.

21. The aortic root size is normal.

22. There is no pericardial effusion.





SONOGRAPHER: Allie Dickens RDCS

## 2020-01-19 NOTE — P.PN
Subjective


Progress Note Date: 01/19/20





 This is an 87-year-old female patient of Dr. VC Ivory with past medical 

history of chronic congestive heart failure, hypertension, hyperlipidemia, 

chronic kidney disease, chronic A. fib on Xarelto, COPD, former history of 

smoking, gastroesophageal reflux disease, dementia.  Patient states she was 

followed with Dr. Ivory 5 weeks ago.  She denies any medication changes at that

time.  Patient complains of worsening of her dyspnea over several months along 

with shortness of breath with exertion, patient sleeping with 2 pillows.  She 

also complains of a wheezing.  Symptoms worsened over the last couple of days.  

Patient came into [facility] emergency center for evaluation.  

Troponin was negative.  Creatinine 0.9, CBC unremarkable, INR 1.2, electrolytes 

within normal limits, alkaline phosphatase 143.  ProBNP 3600.  Urinalysis 

negative for urinary tract infection.  Chest x-ray showed no acute process.  EKG

was atrial fibrillation can troll the right.  Patient was started on IV Lasix 40

mg every 12 hours and Solu-Medrol, nebulizer treatments.  Patient has been seen 

by pulmonary medicine for COPD exacerbation as well.  Patient states that her 

breathing is improving, she still has increased shortness of breath with 

activity.  Weight is down 3 kg. patient states that she does not weigh herself 

at home and she does not have a scale,  has provided her with a 

scale to take home at discharge.








1/19: Patient is seen today in recheck.  Her shortness of breath is improving 

and she states she is doing well ambulating to the bathroom with a walker.  She 

has increasing coughing today and complaining of dry mouth and nose.  Patient is

on Lasix 40 mg IV every day and she states she has had good urine output.  

Patient seems to have more wheezing today from yesterday.  Repeat lab work 

reveals unremarkable CBC.  Potassium 4.9, BUN 34 and creatinine 0.95.











Physical exam: 


Gen: This is an 87-year-old  female.  Patient is seen sitting up in a 

chair eating breakfast and appears to be comfortable and in no acute distress.  

No respiratory distress is noted.


VS: Afebrile, heart rate 72, blood pressure 148/77, pulse ox 97% on 2 L nasal 

cannula


HEENT: Head is atraumatic, normocephalic. Pupils equal, round. Sclerae is 

anicteric. 


NECK: Supple. No JVD. No lymphadenopathy. No thyromegaly. 


LUNGS: Scattered wheezes and rhonchi.  No intercostal retractions.


HEART: Irregular irregular rate and rhythm. No murmur. 


ABDOMEN: Soft. Bowel sounds are present. No masses.  No tenderness.


EXTREMITIES: No pedal edema.  No calf tenderness.  Dorsalis pedis +1 

bilaterally.


NEUROLOGICAL: Patient is awake, alert and oriented x3. Cranial nerves 2 through 

12 are grossly intact. 





 





Assessment:


Acute hypoxic respiratory failure related to acute exacerbation of COPD


Acute on chronic heart failure, suspect systolic


Hypertension


Hyperlipidemia


Chronic trial fibrillation on anticoagulation with Xarelto


GERD 


Previous history of GI bleeding


History of smoking, quit 40 years ago, 35-pack-year smoking history











Plan:


Continue IV Lasix 40 mg daily


Continue Aldactone 25 mg daily


Continue lisinopril 10 mg daily, Pravachol 40 mg daily, Xarelto 5 mg daily


Obtain 2-D echocardiogram and Doppler study to assess cardiac structure and 

function


Monitor I&O and daily weights


Monitor electrolytes and renal function


Thank you kindly for this consultation


Further recommendations to follow based upon clinical course








Nurse practitioner note has been reviewed, I agree with documented findings and 

plan of care.  Patient was seen and examined.





Objective





- Vital Signs


Vital signs: 


                                   Vital Signs











Temp  97.8 F   01/19/20 03:10


 


Pulse  74   01/19/20 07:30


 


Resp  18   01/19/20 03:10


 


BP  148/77   01/19/20 03:10


 


Pulse Ox  97   01/19/20 03:10








                                 Intake & Output











 01/18/20 01/19/20 01/19/20





 18:59 06:59 18:59


 


Intake Total 480  


 


Output Total 1950 650 


 


Balance -1470 -650 


 


Weight  81.5 kg 


 


Intake:   


 


  Oral 480  


 


Output:   


 


  Urine 1950 650 


 


Other:   


 


  Voiding Method Toilet Toilet 


 


  # Voids 1  














- Labs


CBC & Chem 7: 


                                 01/19/20 05:31





                                 01/19/20 05:31


Labs: 


                  Abnormal Lab Results - Last 24 Hours (Table)











  01/18/20 01/18/20 01/18/20 Range/Units





  11:38 16:44 20:22 


 


Neutrophils #     (1.3-7.7)  k/uL


 


Lymphocytes #     (1.0-4.8)  k/uL


 


Sodium     (137-145)  mmol/L


 


Chloride     ()  mmol/L


 


Carbon Dioxide     (22-30)  mmol/L


 


BUN     (7-17)  mg/dL


 


Glucose     (74-99)  mg/dL


 


POC Glucose (mg/dL)  122 H  158 H  156 H  (75-99)  mg/dL














  01/19/20 01/19/20 01/19/20 Range/Units





  05:31 05:31 05:51 


 


Neutrophils #  9.9 H    (1.3-7.7)  k/uL


 


Lymphocytes #  0.2 L    (1.0-4.8)  k/uL


 


Sodium   134 L   (137-145)  mmol/L


 


Chloride   95 L   ()  mmol/L


 


Carbon Dioxide   31 H   (22-30)  mmol/L


 


BUN   34 H   (7-17)  mg/dL


 


Glucose   144 H   (74-99)  mg/dL


 


POC Glucose (mg/dL)    129 H  (75-99)  mg/dL

## 2020-01-19 NOTE — P.PN
Subjective


Progress Note Date: 01/19/20


Principal diagnosis: 





Acute hypoxic respiratory failure secondary to an acute exacerbation of chronic 

obstructive pulmonary disease





 87-year-old female patient of Dr. Patel with past medical history of chronic 

congestive heart failure, chronic kidney disease, chronic A. fib on Xarelto, 

COPD, former history of smoking, who has been noticing gradual worsening of her 

dyspnea over several months.  She states she has been experiencing decreased 

exercise capacity, she would walk from her car to her apartment about 50 feet 

and she would have to sit down and rest, she denied any chest pain.  Last couple

of days she has been experiencing significant worsening shortness of breath, 

orthopnea, patient has been coughing more so at night.  Was increased wheezing, 

she is not normally on oxygen, has never seen a pulmonary specialist, patient is

on Spiriva for maintenance inhaler.  Denied any swelling in her lower 

extremities, denied any fever or chills, denied any chest pain, denied any 

purulent sputum production.  She states she had been evaluated by Dr. VC Ivory 

in the past for heart disease.  Her chest x-ray was completely showed no 

definite acute radiographic process.  Labs were completed and CBC was 

unremarkable, INR is 1.2, electrolytes were within normal limits, BUN is 18 

creatinine 0.90, Shayne ALT were within normal limits, alkaline phosphatase is 

143, troponin was 0.014, proBNP is 3600, urinalysis showed trace glucose, and 

rare bacteria, no significant sign of infection.  Patient was started on 

diuretics, breathing treatments, and were consulted for acute COPD exacerbation





The patient is seen today 01/18/2020 in follow-up on the selective care unit.  

Awake and alert in no acute distress.  Maintaining O2 saturations in the mid 90s

on 2 L/m per nasal cannula.  She's afebrile.  Hemodynamically stable.  White 

count 5.2.  Hemoglobin 12.8.  Sodium 137.  Potassium 4.2.  Bicarb 31.  

Creatinine 0.96.  She remains on DuoNeb inhalations, Perforomist and Pulmicort 

inhalations, IV Solu-Medrol.  She is also on IV diuretics and diuresing well.  

Anticoagulated with Xarelto.





The patient is seen today 01/19/2020 in follow-up on the selective care unit.  

She is currently sitting up in a chair at the bedside.  Awake and alert in no 

acute distress.  Breathing a bit easier today compared to yesterday.  

Maintaining good O2 saturations in the mid 90s on room air.  She's afebrile.  

Hemodynamically stable.  She remains on IV diuretics and currently in a negative

balance.  White count 10.5.  Hemoglobin 12.4.  Sodium 134.  Potassium 4.6.  

Creatinine 0.95.  She's been up ambulating in the hallway without acute 

distress.





Objective





- Vital Signs


Vital signs: 


                                   Vital Signs











Temp  98.5 F   01/19/20 11:57


 


Pulse  60   01/19/20 11:57


 


Resp  20   01/19/20 11:57


 


BP  145/76   01/19/20 11:57


 


Pulse Ox  96   01/19/20 11:57








                                 Intake & Output











 01/18/20 01/19/20 01/19/20





 18:59 06:59 18:59


 


Intake Total 480  240


 


Output Total 1950 650 


 


Balance -1470 -650 240


 


Weight  81.5 kg 


 


Intake:   


 


  Oral 480  240


 


Output:   


 


  Urine 1950 650 


 


Other:   


 


  Voiding Method Toilet Toilet Toilet


 


  # Voids 1  300














- Exam





GENERAL EXAM: Alert, very pleasant, 87-year-old white female, on room air with a

pulse ox of 96%, comfortable in no apparent distress.


HEAD: Normocephalic/atraumatic.


EYES: Normal reaction of pupils, equal size.  Conjunctiva pink, sclera white.


NOSE: Clear with pink turbinates.


THROAT: No erythema or exudates.


NECK: No masses, no JVD, no thyroid enlargement, no adenopathy.


CHEST: No chest wall deformity.  Symmetrical expansion. 


LUNGS: Equal air entry with bilateral scattered wheezes


CVS: Regular rate and rhythm, normal S1 and S2, no gallops, no murmurs, no rubs


ABDOMEN: Soft, nontender.  No hepatosplenomegaly, normal bowel sounds, no 

guarding or rigidity.


EXTREMITIES: No clubbing, no edema, no cyanosis, 2+ pulses and upper and lower 

extremities.


MUSCULOSKELETAL: Muscle strength and tone normal.


SPINE: No scoliosis or deformity


SKIN: No rashes


CENTRAL NERVOUS SYSTEM:  No focal deficits, tone is normal in all 4 extremities.


PSYCHIATRIC: Alert and oriented -3.  Appropriate affect.  Intact judgment and 

insight.





- Labs


CBC & Chem 7: 


                                 01/19/20 05:31





                                 01/19/20 05:31


Labs: 


                  Abnormal Lab Results - Last 24 Hours (Table)











  01/18/20 01/18/20 01/19/20 Range/Units





  16:44 20:22 05:31 


 


Neutrophils #    9.9 H  (1.3-7.7)  k/uL


 


Lymphocytes #    0.2 L  (1.0-4.8)  k/uL


 


Sodium     (137-145)  mmol/L


 


Chloride     ()  mmol/L


 


Carbon Dioxide     (22-30)  mmol/L


 


BUN     (7-17)  mg/dL


 


Glucose     (74-99)  mg/dL


 


POC Glucose (mg/dL)  158 H  156 H   (75-99)  mg/dL














  01/19/20 01/19/20 01/19/20 Range/Units





  05:31 05:51 11:42 


 


Neutrophils #     (1.3-7.7)  k/uL


 


Lymphocytes #     (1.0-4.8)  k/uL


 


Sodium  134 L    (137-145)  mmol/L


 


Chloride  95 L    ()  mmol/L


 


Carbon Dioxide  31 H    (22-30)  mmol/L


 


BUN  34 H    (7-17)  mg/dL


 


Glucose  144 H    (74-99)  mg/dL


 


POC Glucose (mg/dL)   129 H  118 H  (75-99)  mg/dL














Assessment and Plan


Assessment: 





#1.  Acute hypoxic respiratory failure related to acute exacerbation of COPD





#2.  Acute exacerbation of CHF with unknown EF, remains on IV diuretics





#3.  Hypertension





#4.  Atrial fibrillation on anticoagulation with Xarelto





#5.  GERD/reflux





#6.  COPD





#7.  Hyperlipidemia





#8.  Previous history of GI bleeding





#9.  History of smoking, in remission for last 40 years, does carry 35-pack-year

smoking history





Plan:





The patient was seen and evaluated by Dr. Reyes.  She is doing much better 

today compared to yesterday.  On room air.  Continue the current treatment plan.

 Remains on IV diuretics.  Echocardiogram pending.  We will continue to follow.





I, the cosigning physician, performed a history & physical examination of the 

patient. Lungs sounds with faint bilateral end expiratory wheeze, diminished.  

Maintaining good O2 saturations in the 90s on room air.  I discussed the 

assessment and plan of care with my nurse practitioner, Jennifer Velasquez. I attest to 

the above note as dictated by her.

## 2020-01-19 NOTE — PN
PROGRESS NOTE



DATE OF SERVICE:

01/19/2020



I am covering for Dr. Patel.



This 87-year-old woman was admitted with shortness of breath, COPD, CHF acute

exacerbation, being closely monitored.  No chest pain.  No palpitations.  No fever.

The patient is on IV Lasix now.



PHYSICAL EXAM:

Alert and oriented x3.  Pulse is 60. Blood pressure 145/76, respirations 20,

temperature 98.4, pulse ox 98% on room air.

HEENT: Conjunctivae normal.

NECK: No JVD.

CARDIOVASCULAR: S1, S2 muffled.

RESPIRATION: Breath sounds diminished in the bases.  A few scattered rhonchi and

crackles.  ABDOMEN:  Soft and nontender.  NERVOUS SYSTEM: No focal deficits.



LABORATORY DATA:

WBC 10.2, hemoglobin 12.4.



ASSESSMENT:

1. Shortness of breath possibly combination of chronic obstructive pulmonary disease

    acute exacerbation as well as congestive heart failure acute exacerbation.

    Ejection fraction unknown.

2. Acute hypoxic respiratory failure present on admission, secondary to above.

3. Hypertension.

4. Atrial fibrillation, rate controlled, chronic, persistent.

5. Anticoagulation on Xarelto.

6. History of gastroesophageal reflux disease.

7. History of gastrointestinal bleed.

8. Hyperlipidemia.

9. History of restless legs syndrome.

10.History of cholecystectomy.

11.History of degenerative joint disease.

12.History of anxiety.

13.History of nicotine dependence.



RECOMMENDATIONS AND DISCUSSION:

Recommend to continue current medications, management and symptomatic treatment.

Continue the bronchodilators.  Continue with continue with diuretics.  Dr. Patel will

follow tomorrow.  Continue the rest of medications.  See orders for details.





MMODL / IJN: [id number] / Job#: [id number]

## 2020-01-20 LAB
ANION GAP SERPL CALC-SCNC: 7 MMOL/L
BASOPHILS # BLD AUTO: 0 K/UL (ref 0–0.2)
BASOPHILS NFR BLD AUTO: 0 %
BUN SERPL-SCNC: 34 MG/DL (ref 7–17)
CALCIUM SPEC-MCNC: 9 MG/DL (ref 8.4–10.2)
CHLORIDE SERPL-SCNC: 95 MMOL/L (ref 98–107)
CO2 SERPL-SCNC: 30 MMOL/L (ref 22–30)
EOSINOPHIL # BLD AUTO: 0 K/UL (ref 0–0.7)
EOSINOPHIL NFR BLD AUTO: 0 %
ERYTHROCYTE [DISTWIDTH] IN BLOOD BY AUTOMATED COUNT: 4.31 M/UL (ref 3.8–5.4)
ERYTHROCYTE [DISTWIDTH] IN BLOOD: 12 % (ref 11.5–15.5)
GLUCOSE BLD-MCNC: 117 MG/DL (ref 75–99)
GLUCOSE BLD-MCNC: 138 MG/DL (ref 75–99)
GLUCOSE BLD-MCNC: 150 MG/DL (ref 75–99)
GLUCOSE BLD-MCNC: 186 MG/DL (ref 75–99)
GLUCOSE SERPL-MCNC: 146 MG/DL (ref 74–99)
HCT VFR BLD AUTO: 37.8 % (ref 34–46)
HGB BLD-MCNC: 12.8 GM/DL (ref 11.4–16)
LYMPHOCYTES # SPEC AUTO: 0.3 K/UL (ref 1–4.8)
LYMPHOCYTES NFR SPEC AUTO: 4 %
MCH RBC QN AUTO: 29.6 PG (ref 25–35)
MCHC RBC AUTO-ENTMCNC: 33.7 G/DL (ref 31–37)
MCV RBC AUTO: 87.8 FL (ref 80–100)
MONOCYTES # BLD AUTO: 0.3 K/UL (ref 0–1)
MONOCYTES NFR BLD AUTO: 4 %
NEUTROPHILS # BLD AUTO: 6.5 K/UL (ref 1.3–7.7)
NEUTROPHILS NFR BLD AUTO: 91 %
PLATELET # BLD AUTO: 175 K/UL (ref 150–450)
POTASSIUM SERPL-SCNC: 4.4 MMOL/L (ref 3.5–5.1)
SODIUM SERPL-SCNC: 132 MMOL/L (ref 137–145)
WBC # BLD AUTO: 7.2 K/UL (ref 3.8–10.6)

## 2020-01-20 RX ADMIN — INSULIN ASPART SCH UNIT: 100 INJECTION, SOLUTION INTRAVENOUS; SUBCUTANEOUS at 06:15

## 2020-01-20 RX ADMIN — BUDESONIDE SCH MG: 1 SUSPENSION RESPIRATORY (INHALATION) at 08:43

## 2020-01-20 RX ADMIN — PRAVASTATIN SODIUM SCH MG: 40 TABLET ORAL at 20:12

## 2020-01-20 RX ADMIN — IPRATROPIUM BROMIDE AND ALBUTEROL SULFATE SCH ML: .5; 3 SOLUTION RESPIRATORY (INHALATION) at 16:12

## 2020-01-20 RX ADMIN — BUDESONIDE SCH MG: 1 SUSPENSION RESPIRATORY (INHALATION) at 19:45

## 2020-01-20 RX ADMIN — SPIRONOLACTONE SCH MG: 25 TABLET, FILM COATED ORAL at 09:49

## 2020-01-20 RX ADMIN — INSULIN ASPART SCH UNIT: 100 INJECTION, SOLUTION INTRAVENOUS; SUBCUTANEOUS at 17:44

## 2020-01-20 RX ADMIN — INSULIN ASPART SCH UNIT: 100 INJECTION, SOLUTION INTRAVENOUS; SUBCUTANEOUS at 21:49

## 2020-01-20 RX ADMIN — RIVAROXABAN SCH MG: 15 TABLET, FILM COATED ORAL at 20:12

## 2020-01-20 RX ADMIN — LISINOPRIL SCH MG: 10 TABLET ORAL at 20:12

## 2020-01-20 RX ADMIN — IPRATROPIUM BROMIDE AND ALBUTEROL SULFATE SCH ML: .5; 3 SOLUTION RESPIRATORY (INHALATION) at 12:43

## 2020-01-20 RX ADMIN — LATANOPROST SCH DROPS: 50 SOLUTION OPHTHALMIC at 20:14

## 2020-01-20 RX ADMIN — DONEPEZIL HYDROCHLORIDE SCH MG: 10 TABLET ORAL at 09:49

## 2020-01-20 RX ADMIN — DOCUSATE SODIUM AND SENNOSIDES SCH EACH: 50; 8.6 TABLET ORAL at 12:49

## 2020-01-20 RX ADMIN — DOCUSATE SODIUM SCH MG: 100 CAPSULE, LIQUID FILLED ORAL at 12:49

## 2020-01-20 RX ADMIN — FUROSEMIDE SCH MG: 10 INJECTION, SOLUTION INTRAMUSCULAR; INTRAVENOUS at 09:50

## 2020-01-20 RX ADMIN — LEVOTHYROXINE SODIUM SCH MCG: 25 TABLET ORAL at 06:15

## 2020-01-20 RX ADMIN — INSULIN ASPART SCH: 100 INJECTION, SOLUTION INTRAVENOUS; SUBCUTANEOUS at 12:37

## 2020-01-20 RX ADMIN — IPRATROPIUM BROMIDE AND ALBUTEROL SULFATE SCH ML: .5; 3 SOLUTION RESPIRATORY (INHALATION) at 19:45

## 2020-01-20 RX ADMIN — CYANOCOBALAMIN TAB 500 MCG SCH MCG: 500 TAB at 09:49

## 2020-01-20 RX ADMIN — FORMOTEROL FUMARATE DIHYDRATE SCH MCG: 20 SOLUTION RESPIRATORY (INHALATION) at 08:43

## 2020-01-20 RX ADMIN — DOCUSATE SODIUM AND SENNOSIDES SCH EACH: 50; 8.6 TABLET ORAL at 20:12

## 2020-01-20 RX ADMIN — FORMOTEROL FUMARATE DIHYDRATE SCH MCG: 20 SOLUTION RESPIRATORY (INHALATION) at 19:45

## 2020-01-20 RX ADMIN — PANTOPRAZOLE SODIUM SCH MG: 40 TABLET, DELAYED RELEASE ORAL at 06:15

## 2020-01-20 RX ADMIN — DOCUSATE SODIUM SCH MG: 100 CAPSULE, LIQUID FILLED ORAL at 20:11

## 2020-01-20 RX ADMIN — METHYLPREDNISOLONE SODIUM SUCCINATE SCH MG: 40 INJECTION, POWDER, FOR SOLUTION INTRAMUSCULAR; INTRAVENOUS at 09:50

## 2020-01-20 RX ADMIN — IPRATROPIUM BROMIDE AND ALBUTEROL SULFATE SCH ML: .5; 3 SOLUTION RESPIRATORY (INHALATION) at 08:43

## 2020-01-20 NOTE — P.PN
Subjective


Progress Note Date: 01/20/20


Principal diagnosis: 


 Acute hypoxic respiratory failure secondary to an acute exacerbation of COPD





87-year-old female patient of Dr. Patel with past medical history of chronic 

congestive heart failure, chronic kidney disease, chronic A. fib on Xarelto, 

COPD, former history of smoking, who has been noticing gradual worsening of her 

dyspnea over several months.  She states she has been experiencing decreased 

exercise capacity, she would walk from her car to her apartment about 50 feet 

and she would have to sit down and rest, she denied any chest pain.  Last couple

of days she has been experiencing significant worsening shortness of breath, 

orthopnea, patient has been coughing more so at night.  Was increased wheezing, 

she is not normally on oxygen, has never seen a pulmonary specialist, patient is

on Spiriva for maintenance inhaler.  Denied any swelling in her lower ext

remities, denied any fever or chills, denied any chest pain, denied any purulent

sputum production.  She states she had been evaluated by Dr. VC Ivory in the 

past for heart disease.  Her chest x-ray was completely showed no definite acute

radiographic process.  Labs were completed and CBC was unremarkable, INR is 1.2,

electrolytes were within normal limits, BUN is 18 creatinine 0.90, Shayne ALT we

re within normal limits, alkaline phosphatase is 143, troponin was 0.014, proBNP

is 3600, urinalysis showed trace glucose, and rare bacteria, no significant sign

of infection.  Patient was started on diuretics, breathing treatments, and were 

consulted for acute COPD exacerbation





The patient is seen today 01/18/2020 in follow-up on the selective care unit.  

Awake and alert in no acute distress.  Maintaining O2 saturations in the mid 90s

on 2 L/m per nasal cannula.  She's afebrile.  Hemodynamically stable.  White 

count 5.2.  Hemoglobin 12.8.  Sodium 137.  Potassium 4.2.  Bicarb 31.  

Creatinine 0.96.  She remains on DuoNeb inhalations, Perforomist and Pulmicort 

inhalations, IV Solu-Medrol.  She is also on IV diuretics and diuresing well.  

Anticoagulated with Xarelto.





The patient is seen today 01/19/2020 in follow-up on the selective care unit.  

She is currently sitting up in a chair at the bedside.  Awake and alert in no 

acute distress.  Breathing a bit easier today compared to yesterday.  

Maintaining good O2 saturations in the mid 90s on room air.  She's afebrile.  

Hemodynamically stable.  She remains on IV diuretics and currently in a negative

balance.  White count 10.5.  Hemoglobin 12.4.  Sodium 134.  Potassium 4.6.  

Creatinine 0.95.  She's been up ambulating in the hallway without acute 

distress.





On 01/20/2020 patient seen in follow-up on selective care unit, she is seen si

tting up in the chair, in no acute distress, pulse ox is 94%, no fever of 

chills, hemodynamically stable, she states she is feeling better, she sounds 

better no specific complaints, no chest pain, no worsening shortness of breath. 

Echocardiogram showed preserved low-normal EF of 50-55%, moderate mitral 

regurgitation, echodensity attached to the LAD side of the anterior mitral 

leaflet, mild tricuspid regurg, moderate pulmonary hypertension with right-sided

pressures of 54.4 mmHg.  Patient continues on IV Lasix currently just once daily

40 mg, she is maintaining negative fluid balance.  Today's labs have been 

reviewed, CBC is unremarkable, sodium is 132, potassium is 4.4, chloride is 95, 

CO2 is 30, B1 is 34 creatinine 0.91.  She is doing well, cardiology is 

following.








Objective





- Vital Signs


Vital signs: 


                                   Vital Signs











Temp  97.7 F   01/20/20 08:00


 


Pulse  64   01/20/20 12:53


 


Resp  19   01/20/20 12:00


 


BP  153/66   01/20/20 12:00


 


Pulse Ox  94 L  01/20/20 12:00








                                 Intake & Output











 01/19/20 01/20/20 01/20/20





 18:59 06:59 18:59


 


Intake Total 720 120 240


 


Output Total 450 1800 900


 


Balance 270 -1680 -660


 


Weight  83.1 kg 


 


Intake:   


 


  Oral 720 120 240


 


Output:   


 


  Urine 450 1800 900


 


Other:   


 


  Voiding Method Toilet Toilet 


 


  # Voids 1 1 2


 


  # Bowel Movements   0














- Exam


 GENERAL EXAM: Alert, very pleasant, 87-year-old white female, on room air with 

a pulse ox of 96%, sitting up in the recliner, comfortable in no apparent 

distress.


HEAD: Normocephalic/atraumatic.


EYES: Normal reaction of pupils, equal size.  Conjunctiva pink, sclera white.


NOSE: Clear with pink turbinates.


THROAT: No erythema or exudates.


NECK: No masses, no JVD, no thyroid enlargement, no adenopathy.


CHEST: No chest wall deformity.  Symmetrical expansion. 


LUNGS: Equal air entry with without wheezes, rhonchi or rales


CVS: Regular rate and rhythm, normal S1 and S2, no gallops, no murmurs, no rubs


ABDOMEN: Soft, nontender.  No hepatosplenomegaly, normal bowel sounds, no 

guarding or rigidity.


EXTREMITIES: No clubbing, no edema, no cyanosis, 2+ pulses and upper and lower 

extremities.


MUSCULOSKELETAL: Muscle strength and tone normal.


SPINE: No scoliosis or deformity


SKIN: No rashes


CENTRAL NERVOUS SYSTEM: Alert and oriented -3.  No focal deficits, tone is 

normal in all 4 extremities.


PSYCHIATRIC: Alert and oriented -3.  Appropriate affect.  Intact judgment and 

insight.








- Labs


CBC & Chem 7: 


                                 01/20/20 05:34





                                 01/20/20 05:34


Labs: 


                  Abnormal Lab Results - Last 24 Hours (Table)











  01/19/20 01/19/20 01/20/20 Range/Units





  16:53 20:03 05:34 


 


Lymphocytes #    0.3 L  (1.0-4.8)  k/uL


 


Sodium     (137-145)  mmol/L


 


Chloride     ()  mmol/L


 


BUN     (7-17)  mg/dL


 


Glucose     (74-99)  mg/dL


 


POC Glucose (mg/dL)  120 H  158 H   (75-99)  mg/dL














  01/20/20 01/20/20 01/20/20 Range/Units





  05:34 06:05 11:43 


 


Lymphocytes #     (1.0-4.8)  k/uL


 


Sodium  132 L    (137-145)  mmol/L


 


Chloride  95 L    ()  mmol/L


 


BUN  34 H    (7-17)  mg/dL


 


Glucose  146 H    (74-99)  mg/dL


 


POC Glucose (mg/dL)   138 H  117 H  (75-99)  mg/dL














Assessment and Plan


Plan: 


 Assessment:





#1.  Acute hypoxic respiratory failure related to acute exacerbation of COPD





#2.  Acute exacerbation of CHF with diastolic dysfunction, moderate mitral 

regurgitation and moderate hypertension with right-sided pressures of 54.4 mmHg





#3.  Hypertension





#4.  Atrial fibrillation on anticoagulation with Xarelto





#5.  GERD/reflux





#6.  COPD





#7.  Hyperlipidemia





#8.  Previous history of GI bleeding





#9.  History of smoking, in remission for last 40 years, does carry 35-pack-year

smoking history





Plan:





We'll transition the IV steroids to oral prednisone, continue with nebulized 

bronchodilators, diuretics per cardiology, patient is in negative fluid balance,

no complaints of dyspnea, no completed chest pain, breathing much easier, 

increase activity as tolerated.  Echocardiogram results have been noted, from 

pulmonary perspective she is much improved, and could be considered for 

discharge home once cleared by cardiology





I performed a history & physical examination of the patient and discussed their 

management with my nurse practitioner, Katrin Cervantes.  I reviewed the nurse 

practitioner's note and agree with the documented findings and plan of care.  

Lung sounds are positive for diffuse wheezes throughout the lung fields.  The 

findings and the impression was discussed with the patient.  I attest to the 

documentation by the nurse practitioner. 





Time with Patient: Less than 30

## 2020-01-20 NOTE — P.PN
Subjective


Progress Note Date: 01/20/20


87-year-old female with history of atrial fibrillation, CHF, COPD, 

gastroesophageal reflux disease, GI bleed, former nicotine dependence and 

multiple other medical issues presented to the ER with complaints of worsening 

shortness of breath over the last couple of days accompanied by nonproductive 

cough, peripheral edema ,no chills, no diaphoresis, febrile.  Initially 

evaluated at outside clinic and referred to the ER for further evaluation.  Upon

arrival patient using accessory muscles, tachypneic, with O2 sat of 90% on room 

air. EKG report atrial fibrillation with PVCs, nonspecific ST and T waves, t

roponin 0.014, BNP 3600. Denies any chest pain, palpitations.  Labs essentially 

unremarkable. Afebrile, normal WBC, INR 1.2 BUN 18 creatinine 0.9, potassium 

4.3, magnesium 1.7, alk phos 143.  UA reporting rare bacteria trace of 

leukocytes , WBC of 1 ,negative for nitrates.  Chest x-ray reporting similar to 

prior study, non acute.  Nebulized breathing treatments, steroids, Lasix and 

she.





01/20/2020 Diuresing well on IV push Lasix with 24-hour I&O reflecting a 

negative fluid balance.  Complains of constipation.  Denies chest pain, 

palpitations or increasing shortness of breath.  Afebrile.





Objective





- Vital Signs


Vital signs: 


                                   Vital Signs











Temp  97.7 F   01/20/20 08:00


 


Pulse  60   01/20/20 09:06


 


Resp  19   01/20/20 08:00


 


BP  135/65   01/20/20 08:00


 


Pulse Ox  95   01/20/20 08:00








                                 Intake & Output











 01/19/20 01/20/20 01/20/20





 18:59 06:59 18:59


 


Intake Total 720 120 240


 


Output Total 450 1800 


 


Balance 270 -1680 240


 


Weight  83.1 kg 


 


Intake:   


 


  Oral 720 120 240


 


Output:   


 


  Urine 450 1800 


 


Other:   


 


  Voiding Method Toilet Toilet 


 


  # Voids 1 1 














- Exam


VITAL SIGNS: As above


GENERAL: Sitting up in recliner, no acute distress


HEENT:  Conjunctivae normal. eyes normal.  Oral mucosa moist


NECK:  No JVD. No thyroid enlargement. No LNs


CARDIOVASCULAR:  S1, S2 regular.. No murmur


RESPIRATION: Breath sounds centrally clear with bilateral bases diminished.


ABDOMEN:  Soft,  nontender . No guarding. no masses palpable. Bowel sounds 

heard.


LEGS: No edema


PSYCHIATRY: Alert and oriented X3, mood and affect normal.


NERVOUS SYSTEM:  Cranial N 2-12 grossly normal. Moves all 4 limbs.  Diffuse 

weakness No focal deficits.  Strength and sensation grossly intact.


Skin: no rash 


Lymphatic system. No LN neck axilla.











- Labs


CBC & Chem 7: 


                                 01/20/20 05:34





                                 01/20/20 05:34


Labs: 


                  Abnormal Lab Results - Last 24 Hours (Table)











  01/19/20 01/19/20 01/19/20 Range/Units





  11:42 16:53 20:03 


 


Lymphocytes #     (1.0-4.8)  k/uL


 


Sodium     (137-145)  mmol/L


 


Chloride     ()  mmol/L


 


BUN     (7-17)  mg/dL


 


Glucose     (74-99)  mg/dL


 


POC Glucose (mg/dL)  118 H  120 H  158 H  (75-99)  mg/dL














  01/20/20 01/20/20 01/20/20 Range/Units





  05:34 05:34 06:05 


 


Lymphocytes #  0.3 L    (1.0-4.8)  k/uL


 


Sodium   132 L   (137-145)  mmol/L


 


Chloride   95 L   ()  mmol/L


 


BUN   34 H   (7-17)  mg/dL


 


Glucose   146 H   (74-99)  mg/dL


 


POC Glucose (mg/dL)    138 H  (75-99)  mg/dL














Assessment and Plan


Assessment: 


Acute hypoxic respiratory failure secondary to acute COPD exacerbation, acute 

CHF


Acute on chronic CHF, diastolic dysfunction, EF 50-55%


Chronic persistent  atrial fibrillation on Xarelto


Severely dilated LA


Moderate mitral regurgitation


Gastroesophageal reflux disease


History of GI bleed 


Hypertension


hyperlipidemia 


restless leg syndrome


Anxiety 


Former nicotine dependence

















Plan: Continue on current medication regime ,monitoring and symptomatic 

treatment.  Maintain nebulized bronchodilators, steroids, diuretics.  Colace, 

Senokot added to med regime for complaints of constipation.  Discharge planning 

in progress pending cardiology clearance.














The impression and plan of care has been dictated as directed.





:


I performed a history and examination of this patient,  discussed the same with 

the dictator.  I agree with the dictator's note ,documented as a scribe.  Any 

additional findings or plans will be noted.

## 2020-01-20 NOTE — P.PN
Subjective


This is Megan Zamudio PA-C dictating a progress note on this patient


The patient was interviewed and examined by me as well as by Dr. Atkinson


Case discussed with Dr. Atkinson and he agrees with the plan of care





IMPRESSION / ASSESSMENT: 


Acute hypoxic respiratory failure secondary to acute COPD exacerbation


Acute on chronic CHF exacerbation, secondary to diastolic dysfunction, most 

recent echo showing EF 50-55%, mild concentric hypertrophy


Persistent atrial fibrillation, rate controlled, anticoagulation with Xarelto


Hypertension


Dyslipidemia





PLAN:


switch to PO lasix


Continue lisinopril, spironolactone, Pravachol, Anticoagulation with eliquis


From a cardiology perspective, she has improved and is stable for discharge





HPI/interval history


Patient is a 87-year-old female with a past medical history of chronic CHF, 

hypertension, dyslipidemia, chronic A. fib, and COPD presented with complaints 

of worsening dyspnea and cough.  She was admitted for treatment of acute CHF 

exacerbation and COPD exacerbation. She has been diuresing on IV lasix. Patient 

seen and examined sitting in the chair.  She states her breathing has improved. 

She still has a dry cough.  Denies any chest pain.  Has been able to get up and 

walk to the bathroom. 





EXAMINATION


Patient is afebrile, pulse in the 60s, respirations 19, oxygen saturation 94% on

room air, pressure 135/65


Patient seen and examined sitting in the chair, in no acute distress


Bilateral breath sounds diminished with expiratory wheezing bilaterally


Heart is irregular, no audible murmurs


No elevated JVD


No lower extremity edema





REVIEW OF LABS, ECG


WBC 7.2, hemoglobin 12.8, platelets 175, potassium 4.4, BUN 34, creatinine 0.91


Recent echo showing EF 50-55%, mild concentric LVH, moderate MR, echodensity 

attached to the LA side of the anterior mitral leaflet





















































Objective





- Vital Signs


Vital signs: 


                                   Vital Signs











Temp  97.7 F   01/20/20 08:00


 


Pulse  64   01/20/20 12:53


 


Resp  19   01/20/20 12:00


 


BP  153/66   01/20/20 12:00


 


Pulse Ox  94 L  01/20/20 12:00








                                 Intake & Output











 01/19/20 01/20/20 01/20/20





 18:59 06:59 18:59


 


Intake Total 720 120 240


 


Output Total 450 1800 900


 


Balance 270 -1680 -660


 


Weight  83.1 kg 


 


Intake:   


 


  Oral 720 120 240


 


Output:   


 


  Urine 450 1800 900


 


Other:   


 


  Voiding Method Toilet Toilet 


 


  # Voids 1 1 2


 


  # Bowel Movements   0














- Labs


CBC & Chem 7: 


                                 01/20/20 05:34





                                 01/20/20 05:34


Labs: 


                  Abnormal Lab Results - Last 24 Hours (Table)











  01/19/20 01/19/20 01/20/20 Range/Units





  16:53 20:03 05:34 


 


Lymphocytes #    0.3 L  (1.0-4.8)  k/uL


 


Sodium     (137-145)  mmol/L


 


Chloride     ()  mmol/L


 


BUN     (7-17)  mg/dL


 


Glucose     (74-99)  mg/dL


 


POC Glucose (mg/dL)  120 H  158 H   (75-99)  mg/dL














  01/20/20 01/20/20 01/20/20 Range/Units





  05:34 06:05 11:43 


 


Lymphocytes #     (1.0-4.8)  k/uL


 


Sodium  132 L    (137-145)  mmol/L


 


Chloride  95 L    ()  mmol/L


 


BUN  34 H    (7-17)  mg/dL


 


Glucose  146 H    (74-99)  mg/dL


 


POC Glucose (mg/dL)   138 H  117 H  (75-99)  mg/dL

## 2020-01-21 VITALS — DIASTOLIC BLOOD PRESSURE: 63 MMHG | TEMPERATURE: 96.9 F | SYSTOLIC BLOOD PRESSURE: 132 MMHG

## 2020-01-21 VITALS — HEART RATE: 60 BPM

## 2020-01-21 VITALS — RESPIRATION RATE: 18 BRPM

## 2020-01-21 LAB
ANION GAP SERPL CALC-SCNC: 5 MMOL/L
BASOPHILS # BLD AUTO: 0 K/UL (ref 0–0.2)
BASOPHILS NFR BLD AUTO: 0 %
BUN SERPL-SCNC: 36 MG/DL (ref 7–17)
CALCIUM SPEC-MCNC: 8.8 MG/DL (ref 8.4–10.2)
CHLORIDE SERPL-SCNC: 95 MMOL/L (ref 98–107)
CO2 SERPL-SCNC: 32 MMOL/L (ref 22–30)
EOSINOPHIL # BLD AUTO: 0 K/UL (ref 0–0.7)
EOSINOPHIL NFR BLD AUTO: 0 %
ERYTHROCYTE [DISTWIDTH] IN BLOOD BY AUTOMATED COUNT: 4.45 M/UL (ref 3.8–5.4)
ERYTHROCYTE [DISTWIDTH] IN BLOOD: 12 % (ref 11.5–15.5)
GLUCOSE BLD-MCNC: 105 MG/DL (ref 75–99)
GLUCOSE SERPL-MCNC: 94 MG/DL (ref 74–99)
HCT VFR BLD AUTO: 39.3 % (ref 34–46)
HGB BLD-MCNC: 13.3 GM/DL (ref 11.4–16)
LYMPHOCYTES # SPEC AUTO: 0.6 K/UL (ref 1–4.8)
LYMPHOCYTES NFR SPEC AUTO: 7 %
MCH RBC QN AUTO: 29.9 PG (ref 25–35)
MCHC RBC AUTO-ENTMCNC: 33.8 G/DL (ref 31–37)
MCV RBC AUTO: 88.4 FL (ref 80–100)
MONOCYTES # BLD AUTO: 0.7 K/UL (ref 0–1)
MONOCYTES NFR BLD AUTO: 9 %
NEUTROPHILS # BLD AUTO: 6.6 K/UL (ref 1.3–7.7)
NEUTROPHILS NFR BLD AUTO: 81 %
PLATELET # BLD AUTO: 204 K/UL (ref 150–450)
POTASSIUM SERPL-SCNC: 4.6 MMOL/L (ref 3.5–5.1)
SODIUM SERPL-SCNC: 132 MMOL/L (ref 137–145)
WBC # BLD AUTO: 8.1 K/UL (ref 3.8–10.6)

## 2020-01-21 RX ADMIN — GUAIFENESIN PRN MG: 200 SOLUTION ORAL at 01:29

## 2020-01-21 RX ADMIN — FORMOTEROL FUMARATE DIHYDRATE SCH MCG: 20 SOLUTION RESPIRATORY (INHALATION) at 08:06

## 2020-01-21 RX ADMIN — BUDESONIDE SCH MG: 1 SUSPENSION RESPIRATORY (INHALATION) at 08:06

## 2020-01-21 RX ADMIN — INSULIN ASPART SCH: 100 INJECTION, SOLUTION INTRAVENOUS; SUBCUTANEOUS at 06:42

## 2020-01-21 RX ADMIN — DONEPEZIL HYDROCHLORIDE SCH MG: 10 TABLET ORAL at 08:05

## 2020-01-21 RX ADMIN — IPRATROPIUM BROMIDE AND ALBUTEROL SULFATE SCH ML: .5; 3 SOLUTION RESPIRATORY (INHALATION) at 08:06

## 2020-01-21 RX ADMIN — LEVOTHYROXINE SODIUM SCH MCG: 25 TABLET ORAL at 06:52

## 2020-01-21 RX ADMIN — CYANOCOBALAMIN TAB 500 MCG SCH MCG: 500 TAB at 08:05

## 2020-01-21 RX ADMIN — DOCUSATE SODIUM SCH MG: 100 CAPSULE, LIQUID FILLED ORAL at 08:05

## 2020-01-21 RX ADMIN — DOCUSATE SODIUM AND SENNOSIDES SCH EACH: 50; 8.6 TABLET ORAL at 08:05

## 2020-01-21 RX ADMIN — SPIRONOLACTONE SCH MG: 25 TABLET, FILM COATED ORAL at 08:05

## 2020-01-21 NOTE — P.PN
Subjective


This is Megan Zamudio PA-C dictating a progress note on this patient


The patient was interviewed and examined by me as well as by Dr. Atkinson


Case discussed with Dr. Atkinson and he agrees with the plan of care





IMPRESSION / ASSESSMENT: 


Acute hypoxic respiratory failure secondary to acute COPD exacerbation


Acute on chronic CHF exacerbation, secondary to diastolic dysfunction, most 

recent echo showing EF 50-55%, mild concentric hypertrophy


Persistent atrial fibrillation, rate controlled, anticoagulation with Xarelto


Hypertension, blood pressure stable


Dyslipidemia





PLAN:


Continue lisinopril, spironolactone, Lasix, Pravachol, Anticoagulation with 

eliquis


From a cardiology perspective, she has improved and is stable for discharge with

outpatient follow-up with her primary cardiologist





HPI/interval history


Patient is a 87-year-old female with a past medical history of chronic CHF, 

hypertension, dyslipidemia, chronic A. fib, and COPD presented with complaints 

of worsening dyspnea and cough.  She was admitted for treatment of acute CHF 

exacerbation and COPD exacerbation.  Yesterday we switched her to oral Lasix.. 

Patient seen and examined sitting in the chair.  States her breathing has 

improved and she is ready to go home.





EXAMINATION


Patient is afebrile, pulse in the 60s, respirations 18, blood pressure 132/63, 

oxygen saturation 97%


Patient seen and examined sitting in the chair, in no acute distress


Bilateral breath sounds diminished with expiratory wheezing and rhonchi 

bilaterally


Heart is irregular, no audible murmurs


No elevated JVD


No lower extremity edema





REVIEW OF LABS, ECG


WBC 8.1, hemoglobin 13.3, platelets 204, potassium 4.6, BUN 36, creatinine 0.93


Recent echo showing EF 50-55%, mild concentric LVH, moderate MR, echodensity 

attached to the LA side of the anterior mitral leaflet








Objective





- Vital Signs


Vital signs: 


                                   Vital Signs











Temp  96.9 F L  01/21/20 08:09


 


Pulse  60   01/21/20 08:32


 


Resp  18   01/21/20 08:09


 


BP  132/63   01/21/20 08:09


 


Pulse Ox  97   01/21/20 08:09








                                 Intake & Output











 01/20/20 01/21/20 01/21/20





 18:59 06:59 18:59


 


Intake Total 480  240


 


Output Total 900 1000 


 


Balance -420 -1000 240


 


Weight  82.3 kg 


 


Intake:   


 


  Oral 480  240


 


Output:   


 


  Urine 900 1000 


 


Other:   


 


  Voiding Method  Toilet 


 


  # Voids 1 3 1


 


  # Bowel Movements 0 0 














- Labs


CBC & Chem 7: 


                                 01/21/20 05:35





                                 01/21/20 05:35


Labs: 


                  Abnormal Lab Results - Last 24 Hours (Table)











  01/20/20 01/21/20 01/21/20 Range/Units





  20:48 05:35 05:35 


 


Lymphocytes #   0.6 L   (1.0-4.8)  k/uL


 


Sodium    132 L  (137-145)  mmol/L


 


Chloride    95 L  ()  mmol/L


 


Carbon Dioxide    32 H  (22-30)  mmol/L


 


BUN    36 H  (7-17)  mg/dL


 


POC Glucose (mg/dL)  186 H    (75-99)  mg/dL














  01/21/20 Range/Units





  06:35 


 


Lymphocytes #   (1.0-4.8)  k/uL


 


Sodium   (137-145)  mmol/L


 


Chloride   ()  mmol/L


 


Carbon Dioxide   (22-30)  mmol/L


 


BUN   (7-17)  mg/dL


 


POC Glucose (mg/dL)  105 H  (75-99)  mg/dL

## 2020-01-21 NOTE — P.DS
Providers


Date of admission: 


01/16/20 17:34





Expected date of discharge: 01/21/20


Attending physician: 


Kurt Patel





Consults: 





                                        





01/17/20 08:22


Consult Physician Routine 


   Consulting Provider: Mode Reyes


   Consult Reason/Comments: COPD/CHF


   Do you want consulting provider notified?: Yes





01/17/20 15:04


Consult Physician Routine 


   Consulting Provider: John Thakur


   Consult Reason/Comments: CHF


   Do you want consulting provider notified?: Yes











Primary care physician: 


Kurt Patel





Hospital Course: 


Final Diagnoses:


Acute hypoxic respiratory failure secondary to acute COPD exacerbation, acute 

CHF


Acute on chronic CHF, diastolic dysfunction, EF 50-55%


Chronic persistent  atrial fibrillation on Xarelto


Severely dilated LA


Moderate mitral regurgitation


Gastroesophageal reflux disease


History of GI bleed 


Hypertension


hyperlipidemia 


restless leg syndrome


Anxiety 


Former nicotine dependence




















Hospital course:87-year-old female with history of atrial fibrillation, CHF, 

COPD, gastroesophageal reflux disease, GI bleed, former nicotine dependence and 

multiple other medical issues presented to the ER with complaints of worsening 

shortness of breath over the last couple of days accompanied by nonproductive 

cough, peripheral edema ,no chills, no diaphoresis, febrile.  Initially 

evaluated at outside clinic and referred to the ER for further evaluation.  Upon

arrival patient using accessory muscles, tachypneic, with O2 sat of 90% on room 

air. EKG report atrial fibrillation with PVCs, nonspecific ST and T waves, 

troponin 0.014, BNP 3600. Denies any chest pain, palpitations.  Labs essentially

unremarkable. Afebrile, normal WBC, INR 1.2 BUN 18 creatinine 0.9, potassium 

4.3, magnesium 1.7, alk phos 143.  UA reporting rare bacteria trace of 

leukocytes , WBC of 1 ,negative for nitrates.  Chest x-ray reporting similar to 

prior study, non acute.  Nebulized breathing treatments, steroids, Lasix and 

she.





01/20/2020 Diuresing well on IV push Lasix with 24-hour I&O reflecting a ne

gative fluid balance.  Complains of constipation.  Denies chest pain, 

palpitations or increasing shortness of breath.  Afebrile.








Converted to oral Lasix.  Significant clinical improvement.  Cleared for 

discharge by both pulmonary and cardiology.  Patient is being discharged home in

a stable condition with guarded prognosis.








- Exam





GENERAL: Alert and oriented 3, no acute distress


CARDIOVASCULAR:  S1, S2 regular.. No murmur


RESPIRATION: Breath sounds centrally clear with bilateral bases diminished.


ABDOMEN:  Soft,  nontender . No guarding. no masses palpable.  Positive Bowel 

sounds.


NERVOUS SYSTEM:  No focal deficits.





The impression and plan of care has been dictated as directed.





:


I performed a history and examination of this patient,  discussed the same with 

the dictator.  I agree with the dictator's note ,documented as a scribe.  Any 

additional findings or plans will be noted.


Patient Condition at Discharge: Stable





Plan - Discharge Summary


Discharge Rx Participant: No


New Discharge Prescriptions: 


New


   Furosemide [Lasix] 40 mg PO DAILY #30 tab


   Sennosides-Docusate Sodium [Senokot-S] 2 each PO BID #60 tab


   predniSONE 10 mg PO AS DIRECTED #30 tab


   guaiFENesin SYRUP 100MG/5ML [Robitussin] 200 mg PO Q6H PRN  cup


     PRN Reason: Cough





Continue


   LORazepam [Ativan] 0.5 mg PO HS PRN


     PRN Reason: Anxiety


   rOPINIRole HCL [Requip] 1 mg PO HS


   Rivaroxaban [Xarelto] 15 mg PO HS


   Pravastatin Sodium [Pravachol] 40 mg PO HS


   Omeprazole [PriLOSEC] 20 mg PO AC-BRKFST


   Tiotropium 18 Mcg/Puff [Spiriva] 2 cap INHALATION RT-DAILY


   Latanoprost Ophth [Xalatan 0.005%] 1 drops LEFT EYE HS


   Lisinopril [Zestril] 10 mg PO HS


   Spironolactone [Aldactone] 25 mg PO DAILY


   Levothyroxine Sodium [Synthroid] 25 mcg PO DAILY


   Donepezil HCl [Aricept] 10 mg PO DAILY


   Cyanocobalamin (Vitamin B-12) [Vitamin B-12] 1,000 mcg PO DAILY


   Vit C/E/Zn/Coppr/Lutein/Zeaxan [Preservision Areds 2 Softgel] 1 cap PO DAILY





Discontinued


   Furosemide [Lasix] 20 mg PO DAILY


Discharge Medication List





LORazepam [Ativan] 0.5 mg PO HS PRN 08/02/15 [History]


Omeprazole [PriLOSEC] 20 mg PO AC-BRKFST 08/02/15 [History]


Pravastatin Sodium [Pravachol] 40 mg PO HS 08/02/15 [History]


Rivaroxaban [Xarelto] 15 mg PO HS 08/02/15 [History]


Tiotropium 18 Mcg/Puff [Spiriva] 2 cap INHALATION RT-DAILY 08/02/15 [History]


rOPINIRole HCL [Requip] 1 mg PO HS 08/02/15 [History]


Cyanocobalamin (Vitamin B-12) [Vitamin B-12] 1,000 mcg PO DAILY 09/11/19 

[History]


Donepezil HCl [Aricept] 10 mg PO DAILY 09/11/19 [History]


Latanoprost Ophth [Xalatan 0.005%] 1 drops LEFT EYE HS 09/11/19 [History]


Levothyroxine Sodium [Synthroid] 25 mcg PO DAILY 09/11/19 [History]


Lisinopril [Zestril] 10 mg PO HS 09/11/19 [History]


Spironolactone [Aldactone] 25 mg PO DAILY 09/11/19 [History]


Vit C/E/Zn/Coppr/Lutein/Zeaxan [Preservision Areds 2 Softgel] 1 cap PO DAILY 

09/11/19 [History]


Furosemide [Lasix] 40 mg PO DAILY #30 tab 01/21/20 [Rx]


Sennosides-Docusate Sodium [Senokot-S] 2 each PO BID #60 tab 01/21/20 [Rx]


guaiFENesin SYRUP 100MG/5ML [Robitussin] 200 mg PO Q6H PRN  cup 01/21/20 [Rx]


predniSONE 10 mg PO AS DIRECTED #30 tab 01/21/20 [Rx]








Follow up Appointment(s)/Referral(s): 


July Ivory MD [STAFF PHYSICIAN] - 2 Weeks (Office will call with 

appointment. )


Kurt Patel DO [Primary Care Provider] - 3 Days


Mode Reyes MD [STAFF PHYSICIAN] - 2 Weeks


Ambulatory/Diagnostic Orders: 


Complete Blood Count w/diff [LAB.AMB] Time Frame: 3 Days, Location: None 

Selected


Activity/Diet/Wound Care/Special Instructions: 


Home Care - [facility] - [phone]


CHF





1. Weigh yourself every morning after you urinate. If you gain 2-3 pounds 

overnight or 5 pounds in one week, call your primary physician for guidance on 

your medications. Keep a log of your weights.


2. Avoid salt, or foods with hidden salt. Extra salt makes your heart work 

harder and traps the fluid in your body for longer.


3. Take all of your medications as directed, especially your water pills. NEVER 

skip a dose.


4. Elevate your legs when you are not up moving around to help with circulation 

and prevent swelling.


5. Call your physician if you notice any extra swelling in your legs, ankles, 

feet or abdomen, if you have a new dry cough, if your shortness of breath 

worsens with activity or at rest, or if you feel more fatigued.

## 2020-06-15 ENCOUNTER — HOSPITAL ENCOUNTER (EMERGENCY)
Dept: HOSPITAL 47 - EC | Age: 85
LOS: 1 days | Discharge: HOME | End: 2020-06-16
Payer: MEDICARE

## 2020-06-15 VITALS — TEMPERATURE: 98.4 F

## 2020-06-15 DIAGNOSIS — I50.9: ICD-10-CM

## 2020-06-15 DIAGNOSIS — Z88.0: ICD-10-CM

## 2020-06-15 DIAGNOSIS — E78.5: ICD-10-CM
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
## 2020-06-15 DIAGNOSIS — Z98.42: ICD-10-CM

## 2020-06-15 DIAGNOSIS — Z87.891: ICD-10-CM

## 2020-06-15 DIAGNOSIS — I12.9: ICD-10-CM

## 2020-06-15 DIAGNOSIS — K21.9: ICD-10-CM

## 2020-06-15 DIAGNOSIS — Z88.2: ICD-10-CM
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
## 2020-06-15 DIAGNOSIS — J44.9: ICD-10-CM

## 2020-06-15 DIAGNOSIS — S52.592A: Primary | ICD-10-CM

## 2020-06-15 DIAGNOSIS — Z79.01: ICD-10-CM

## 2020-06-15 DIAGNOSIS — Z96.653: ICD-10-CM

## 2020-06-15 DIAGNOSIS — Z79.899: ICD-10-CM

## 2020-06-15 DIAGNOSIS — Z98.41: ICD-10-CM
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
## 2020-06-15 DIAGNOSIS — F41.9: ICD-10-CM

## 2020-06-15 PROCEDURE — 73110 X-RAY EXAM OF WRIST: CPT

## 2020-06-15 PROCEDURE — 99284 EMERGENCY DEPT VISIT MOD MDM: CPT

## 2020-06-15 PROCEDURE — 96372 THER/PROPH/DIAG INJ SC/IM: CPT

## 2020-06-15 PROCEDURE — 29125 APPL SHORT ARM SPLINT STATIC: CPT

## 2020-06-16 VITALS — HEART RATE: 76 BPM | DIASTOLIC BLOOD PRESSURE: 80 MMHG | RESPIRATION RATE: 16 BRPM | SYSTOLIC BLOOD PRESSURE: 145 MMHG

## 2020-06-16 NOTE — XR
EXAMINATION TYPE: XR wrist complete LT

 

DATE OF EXAM: 6/16/2020

 

COMPARISON: NONE

 

HISTORY: Fall. Pain.

 

TECHNIQUE: 4 views

 

FINDINGS: There is impacted transverse fracture distal radial metaphysis. There is mild anterior angu
lation at the fracture site. There is moderately severe osteoarthritis at the first carpometacarpal j
oint. Distal ulna is intact. I see no carpal bone fracture.

 

IMPRESSION: Acute impacted fracture distal radius with mild anterior angulation and minimal posterior
 displacement of the distal fragment.

## 2020-06-16 NOTE — ED
Fall HPI





- General


Chief Complaint: Fall


Stated Complaint: fall


Time Seen by Provider: 06/15/20 23:29


Source: patient, EMS, RN notes reviewed, old records reviewed


Mode of arrival: EMS


Limitations: no limitations





- History of Present Illness


Initial Comments: 





This is an 87-year-old female DF status post mechanical fall.  He says left 

wrist pain.  No other injuries no loss of consciousness did not his head not on 

blood thinners.  Patient states she has severe pain in her left forearm area but

no other significant complaints.  Patient did call EMS EMS brings patient to 

hospital patient still complaining of left extremity pain upper extremity pain


MD Complaint: fall


-: hour(s)


Fall From: standing


When Fall Occurred: 1 hour PTA


Fall Witnessed: yes, by family


Place Fall Occurred: home


Loss of Consciousness: none


Prolonged Down Time?: no


Symptoms Prior to Fall: none


Location - Extremities: Left: Forearm


Severity: severe


Severity scale (1-10): 7


Quality: sharp, crushing


Context: tripped/slipped


Associated Symptoms: denies





- Related Data


                                Home Medications











 Medication  Instructions  Recorded  Confirmed


 


LORazepam [Ativan] 0.5 mg PO HS PRN 08/02/15 01/16/20


 


Omeprazole [PriLOSEC] 20 mg PO AC-BRKFST 08/02/15 01/16/20


 


Pravastatin Sodium [Pravachol] 40 mg PO HS 08/02/15 01/16/20


 


Rivaroxaban [Xarelto] 15 mg PO HS 08/02/15 01/16/20


 


Tiotropium 18 Mcg/Puff [Spiriva] 2 cap INHALATION RT-DAILY 08/02/15 01/16/20


 


rOPINIRole HCL [Requip] 1 mg PO HS 08/02/15 01/16/20


 


Cyanocobalamin (Vitamin B-12) 1,000 mcg PO DAILY 19





[Vitamin B-12]   


 


Donepezil HCl [Aricept] 10 mg PO DAILY 19


 


Latanoprost Ophth [Xalatan 0.005%] 1 drops LEFT EYE HS 19


 


Levothyroxine Sodium [Synthroid] 25 mcg PO DAILY 19


 


Lisinopril [Zestril] 10 mg PO HS 19


 


Spironolactone [Aldactone] 25 mg PO DAILY 19


 


Vit C/E/Zn/Coppr/Lutein/Zeaxan 1 cap PO DAILY 19





[Preservision Areds 2 Softgel]   








                                  Previous Rx's











 Medication  Instructions  Recorded


 


Furosemide [Lasix] 40 mg PO DAILY #30 tab 20


 


Sennosides-Docusate Sodium 2 each PO BID #60 tab 20





[Senokot-S]  


 


guaiFENesin SYRUP 100MG/5ML 200 mg PO Q6H PRN  cup 20





[Robitussin]  


 


predniSONE 10 mg PO AS DIRECTED #30 tab 20











                                    Allergies











Allergy/AdvReac Type Severity Reaction Status Date / Time


 


Penicillins Allergy  Rash/Hives Verified 06/15/20 23:26


 


sulfamethoxazole Allergy  Rash/Hives Verified 06/15/20 23:26





[From Bactrim]     


 


trimethoprim [From Bactrim] Allergy  Rash/Hives Verified 06/15/20 23:26














Review of Systems


ROS Statement: 


Those systems with pertinent positive or pertinent negative responses have been 

documented in the HPI.





ROS Other: All systems not noted in ROS Statement are negative.





Past Medical History


Past Medical History: Atrial Fibrillation, Heart Failure, COPD, GERD/Reflux, GI 

Bleed, Hyperlipidemia, Hypertension


Additional Past Medical History / Comment(s): restless leg syndrome


History of Any Multi-Drug Resistant Organisms: None Reported


Past Surgical History: Appendectomy, Cholecystectomy, Hysterectomy, Joint 

Replacement, Orthopedic Surgery


Additional Past Surgical History / Comment(s): cataracts removed, bilateral 

knees


Past Anesthesia/Blood Transfusion Reactions: No Reported Reaction


Past Psychological History: Anxiety


Smoking Status: Former smoker


Past Alcohol Use History: None Reported


Past Drug Use History: None Reported





- Past Family History


  ** Father


Family Medical History: Coronary Artery Disease (CAD)


Additional Family Medical History / Comment(s): rhemuatic fever,  at 59 of 

"bad heart"





  ** Mother


Family Medical History: Cancer


Additional Family Medical History / Comment(s): uterus





General Exam





- General Exam Comments


Initial Comments: 





Left forearm swelling and tenderness, both radial and ulnar pulses normal no 

neurological deficits


Limitations: no limitations


General appearance: alert, in no apparent distress


Head exam: Present: atraumatic, normocephalic, normal inspection


Eye exam: Present: normal appearance, PERRL, EOMI.  Absent: scleral icterus, 

conjunctival injection, periorbital swelling


ENT exam: Present: normal exam, mucous membranes moist


Neck exam: Present: normal inspection.  Absent: tenderness, meningismus, 

lymphadenopathy


Respiratory exam: Present: normal lung sounds bilaterally.  Absent: respiratory 

distress, wheezes, rales, rhonchi, stridor


Cardiovascular Exam: Present: regular rate, normal rhythm, normal heart sounds. 

Absent: systolic murmur, diastolic murmur, rubs, gallop, clicks


GI/Abdominal exam: Present: soft, normal bowel sounds.  Absent: distended, 

tenderness, guarding, rebound, rigid


Extremities exam: Present: normal inspection, full ROM, normal capillary refill.

 Absent: tenderness, pedal edema, joint swelling, calf tenderness


Back exam: Present: normal inspection


Neurological exam: Present: alert, oriented X3, CN II-XII intact


Psychiatric exam: Present: normal affect, normal mood


Skin exam: Present: warm, dry, intact, normal color.  Absent: rash





Course





                                   Vital Signs











  06/15/20





  23:20


 


Temperature 98.4 F


 


Pulse Rate 51 L


 


Respiratory 18





Rate 


 


Blood Pressure 166/71


 


O2 Sat by Pulse 97





Oximetry 














- Reevaluation(s)


Reevaluation #1: 





20 00:57


Medical records reviewed


Reevaluation #2: 





20 00:57


Pain is controlled





Medical Decision Making





- Medical Decision Making





37 female DF for evaluation.  Patient presents today for evaluation regards to 

left upper Shorty pain patient has isolated radial fracture that is comminuted 

and impacted patient placed in splint he'll be discharged to follow-up with 

orthopedics





- Radiology Data


Radiology results: report reviewed (X-ray left wrist show positive fracture), 

image reviewed





Disposition


Clinical Impression: 


 Fall, Left wrist fracture





Narrative: 





Distal Left Radius Fracture, Comminuted


Disposition: HOME SELF-CARE


Condition: Good


Instructions (If sedation given, give patient instructions):  Fall Prevention 

for Older Adults (ED), Wrist Fracture in Adults (ED)


Is patient prescribed a controlled substance at d/c from ED?: No


Referrals: 


Kurt Patel DO [Primary Care Provider] - 1-2 days

## 2020-06-16 NOTE — ED
Medical Decision Making





- Medical Decision Making





This is addendum documenting splint placement





Disposition


Clinical Impression: 


 Fall, Left wrist fracture





Disposition: HOME SELF-CARE


Condition: Good


Instructions (If sedation given, give patient instructions):  Wrist Fracture in 

Adults (ED), Fall Prevention for Older Adults (ED)


Is patient prescribed a controlled substance at d/c from ED?: No


Referrals: 


Kurt Patel DO [Primary Care Provider] - 1-2 days





Procedures





- Orthopedic Splinting/Casting


  ** Injury #1


Side: left


Upper Extremity Injury Location: wrist


Upper Extremity Immobilizer: ulnar gutter, wrist splint

## 2021-04-21 ENCOUNTER — HOSPITAL ENCOUNTER (INPATIENT)
Dept: HOSPITAL 47 - EC | Age: 86
LOS: 5 days | Discharge: HOME | DRG: 242 | End: 2021-04-26
Attending: FAMILY MEDICINE | Admitting: FAMILY MEDICINE
Payer: MEDICARE

## 2021-04-21 DIAGNOSIS — J96.21: ICD-10-CM

## 2021-04-21 DIAGNOSIS — N18.30: ICD-10-CM

## 2021-04-21 DIAGNOSIS — N39.0: ICD-10-CM

## 2021-04-21 DIAGNOSIS — I13.0: ICD-10-CM

## 2021-04-21 DIAGNOSIS — G25.81: ICD-10-CM

## 2021-04-21 DIAGNOSIS — J44.9: ICD-10-CM

## 2021-04-21 DIAGNOSIS — Q21.1: ICD-10-CM

## 2021-04-21 DIAGNOSIS — I08.1: ICD-10-CM

## 2021-04-21 DIAGNOSIS — K21.9: ICD-10-CM

## 2021-04-21 DIAGNOSIS — Z90.710: ICD-10-CM

## 2021-04-21 DIAGNOSIS — I50.33: ICD-10-CM

## 2021-04-21 DIAGNOSIS — R26.81: ICD-10-CM

## 2021-04-21 DIAGNOSIS — Z87.19: ICD-10-CM

## 2021-04-21 DIAGNOSIS — Z98.42: ICD-10-CM

## 2021-04-21 DIAGNOSIS — R00.1: ICD-10-CM

## 2021-04-21 DIAGNOSIS — Z88.2: ICD-10-CM

## 2021-04-21 DIAGNOSIS — Z82.49: ICD-10-CM

## 2021-04-21 DIAGNOSIS — Z88.0: ICD-10-CM

## 2021-04-21 DIAGNOSIS — Z87.891: ICD-10-CM

## 2021-04-21 DIAGNOSIS — Z96.653: ICD-10-CM

## 2021-04-21 DIAGNOSIS — Z90.49: ICD-10-CM

## 2021-04-21 DIAGNOSIS — Z80.49: ICD-10-CM

## 2021-04-21 DIAGNOSIS — Z79.899: ICD-10-CM

## 2021-04-21 DIAGNOSIS — Z79.890: ICD-10-CM

## 2021-04-21 DIAGNOSIS — Z79.01: ICD-10-CM

## 2021-04-21 DIAGNOSIS — Z20.822: ICD-10-CM

## 2021-04-21 DIAGNOSIS — E78.5: ICD-10-CM

## 2021-04-21 DIAGNOSIS — F03.90: ICD-10-CM

## 2021-04-21 DIAGNOSIS — J84.10: ICD-10-CM

## 2021-04-21 DIAGNOSIS — Z87.42: ICD-10-CM

## 2021-04-21 DIAGNOSIS — F41.9: ICD-10-CM

## 2021-04-21 DIAGNOSIS — I27.20: ICD-10-CM

## 2021-04-21 DIAGNOSIS — Z83.1: ICD-10-CM

## 2021-04-21 DIAGNOSIS — Z98.890: ICD-10-CM

## 2021-04-21 DIAGNOSIS — I48.21: Primary | ICD-10-CM

## 2021-04-21 DIAGNOSIS — Z98.41: ICD-10-CM

## 2021-04-21 LAB
ALBUMIN SERPL-MCNC: 3.6 G/DL (ref 3.5–5)
ALP SERPL-CCNC: 84 U/L (ref 38–126)
ALT SERPL-CCNC: 8 U/L (ref 4–34)
ANION GAP SERPL CALC-SCNC: 7 MMOL/L
APTT BLD: 28.5 SEC (ref 22–30)
AST SERPL-CCNC: 18 U/L (ref 14–36)
BASOPHILS # BLD AUTO: 0 K/UL (ref 0–0.2)
BASOPHILS NFR BLD AUTO: 0 %
BUN SERPL-SCNC: 22 MG/DL (ref 7–17)
CALCIUM SPEC-MCNC: 9.4 MG/DL (ref 8.4–10.2)
CHLORIDE SERPL-SCNC: 103 MMOL/L (ref 98–107)
CO2 SERPL-SCNC: 26 MMOL/L (ref 22–30)
EOSINOPHIL # BLD AUTO: 0.1 K/UL (ref 0–0.7)
EOSINOPHIL NFR BLD AUTO: 2 %
ERYTHROCYTE [DISTWIDTH] IN BLOOD BY AUTOMATED COUNT: 3.93 M/UL (ref 3.8–5.4)
ERYTHROCYTE [DISTWIDTH] IN BLOOD: 12.5 % (ref 11.5–15.5)
GLUCOSE SERPL-MCNC: 108 MG/DL (ref 74–99)
HCT VFR BLD AUTO: 35.4 % (ref 34–46)
HGB BLD-MCNC: 12.2 GM/DL (ref 11.4–16)
INR PPP: 1.3 (ref ?–1.2)
LYMPHOCYTES # SPEC AUTO: 0.8 K/UL (ref 1–4.8)
LYMPHOCYTES NFR SPEC AUTO: 15 %
MAGNESIUM SPEC-SCNC: 2 MG/DL (ref 1.6–2.3)
MCH RBC QN AUTO: 31.1 PG (ref 25–35)
MCHC RBC AUTO-ENTMCNC: 34.5 G/DL (ref 31–37)
MCV RBC AUTO: 90 FL (ref 80–100)
MONOCYTES # BLD AUTO: 0.4 K/UL (ref 0–1)
MONOCYTES NFR BLD AUTO: 7 %
NEUTROPHILS # BLD AUTO: 4 K/UL (ref 1.3–7.7)
NEUTROPHILS NFR BLD AUTO: 73 %
PH UR: 6.5 [PH] (ref 5–8)
PH UR: 7 [PH] (ref 5–8)
PLATELET # BLD AUTO: 191 K/UL (ref 150–450)
POTASSIUM SERPL-SCNC: 4.8 MMOL/L (ref 3.5–5.1)
PROT SERPL-MCNC: 6.2 G/DL (ref 6.3–8.2)
PT BLD: 13.3 SEC (ref 9–12)
RBC UR QL: 2 /HPF (ref 0–5)
SODIUM SERPL-SCNC: 136 MMOL/L (ref 137–145)
SP GR UR: 1.01 (ref 1–1.03)
SP GR UR: 1.01 (ref 1–1.03)
SQUAMOUS UR QL AUTO: 8 /HPF (ref 0–4)
UROBILINOGEN UR QL STRIP: <2 MG/DL (ref ?–2)
UROBILINOGEN UR QL STRIP: <2 MG/DL (ref ?–2)
WBC # BLD AUTO: 5.4 K/UL (ref 3.8–10.6)
WBC #/AREA URNS HPF: 17 /HPF (ref 0–5)

## 2021-04-21 PROCEDURE — 71046 X-RAY EXAM CHEST 2 VIEWS: CPT

## 2021-04-21 PROCEDURE — 84484 ASSAY OF TROPONIN QUANT: CPT

## 2021-04-21 PROCEDURE — 80069 RENAL FUNCTION PANEL: CPT

## 2021-04-21 PROCEDURE — 36415 COLL VENOUS BLD VENIPUNCTURE: CPT

## 2021-04-21 PROCEDURE — 87086 URINE CULTURE/COLONY COUNT: CPT

## 2021-04-21 PROCEDURE — 71045 X-RAY EXAM CHEST 1 VIEW: CPT

## 2021-04-21 PROCEDURE — 85730 THROMBOPLASTIN TIME PARTIAL: CPT

## 2021-04-21 PROCEDURE — 85610 PROTHROMBIN TIME: CPT

## 2021-04-21 PROCEDURE — 80048 BASIC METABOLIC PNL TOTAL CA: CPT

## 2021-04-21 PROCEDURE — 81001 URINALYSIS AUTO W/SCOPE: CPT

## 2021-04-21 PROCEDURE — 99285 EMERGENCY DEPT VISIT HI MDM: CPT

## 2021-04-21 PROCEDURE — 84145 PROCALCITONIN (PCT): CPT

## 2021-04-21 PROCEDURE — 83880 ASSAY OF NATRIURETIC PEPTIDE: CPT

## 2021-04-21 PROCEDURE — 93005 ELECTROCARDIOGRAM TRACING: CPT

## 2021-04-21 PROCEDURE — 87635 SARS-COV-2 COVID-19 AMP PRB: CPT

## 2021-04-21 PROCEDURE — 84443 ASSAY THYROID STIM HORMONE: CPT

## 2021-04-21 PROCEDURE — 85025 COMPLETE CBC W/AUTO DIFF WBC: CPT

## 2021-04-21 PROCEDURE — 33208 INSRT HEART PM ATRIAL & VENT: CPT

## 2021-04-21 PROCEDURE — 81003 URINALYSIS AUTO W/O SCOPE: CPT

## 2021-04-21 PROCEDURE — 80053 COMPREHEN METABOLIC PANEL: CPT

## 2021-04-21 PROCEDURE — 83735 ASSAY OF MAGNESIUM: CPT

## 2021-04-21 PROCEDURE — 33225 L VENTRIC PACING LEAD ADD-ON: CPT

## 2021-04-21 RX ADMIN — FUROSEMIDE SCH MG: 40 TABLET ORAL at 08:00

## 2021-04-21 RX ADMIN — DONEPEZIL HYDROCHLORIDE SCH MG: 10 TABLET ORAL at 08:00

## 2021-04-21 RX ADMIN — PRAVASTATIN SODIUM SCH MG: 40 TABLET ORAL at 22:02

## 2021-04-21 RX ADMIN — PANTOPRAZOLE SODIUM SCH MG: 40 TABLET, DELAYED RELEASE ORAL at 07:59

## 2021-04-21 RX ADMIN — DOCUSATE SODIUM AND SENNOSIDES SCH EACH: 50; 8.6 TABLET ORAL at 08:00

## 2021-04-21 RX ADMIN — SPIRONOLACTONE SCH MG: 25 TABLET, FILM COATED ORAL at 08:00

## 2021-04-21 RX ADMIN — LEVOTHYROXINE SODIUM SCH MCG: 25 TABLET ORAL at 08:00

## 2021-04-21 RX ADMIN — DOCUSATE SODIUM AND SENNOSIDES SCH: 50; 8.6 TABLET ORAL at 22:03

## 2021-04-21 RX ADMIN — LATANOPROST SCH DROPS: 50 SOLUTION OPHTHALMIC at 22:03

## 2021-04-21 RX ADMIN — CYANOCOBALAMIN TAB 500 MCG SCH MCG: 500 TAB at 08:00

## 2021-04-21 NOTE — P.CRDCN
History of Present Illness


History of present illness: 





HISTORY OF PRESENTING ILLNESS


Patient is a pleasant 89 yo female with history of mild dementia, chronic Afib, 

HLD, HTN, diastolic heart failure, pulmonary hypertension, tricuspid regurgitati

on.  I had recently seen patient in the office  where she had been 

complaining of increasing episodes of lightheadedness which can occur mainly 

with walking.  She has always been somewhat unsteady on her feet walking with a 

walker however now has noted feeling more lightheaded.  We therefore were making

arrangements for 1 week monitor as this was happening on a daily basis.  She 

also had a recent echo in the office 3/23/2021 which showed EF 55% grade 3 

diastolic dysfunction, PFO, moderate to severe MR, moderate TR, RVSP 63.  She 

has been doing well on her anticoagulation with Xarelto.  She has not been on 

any rate contolling medications and only Aricept that may cause mild 

bradycardia.  She has been having persistent lightheadedness and therefore came 

to ER where initial EKG showed Afib with HR in the 30's at 39.  She denies any 

syncope.  She does have some chronic COBB.  She initially was given some IVF then

given Lasix 20mg IV.   





DIAGNOSTICS


Troponin normal x 3, Cr 1.09, BUN 22, TSH 3.5





REVIEW OF SYSTEMS


At the time of my exam:


CONSTITUTIONAL: +weakness. Denies fever or chills.


CARDIOVASCULAR: Denies chest pain, +mild shortness of breath, no orthopnea, PND 

or palpitations.


RESPIRATORY: Denies cough. 


GASTROINTESTINAL: Denies abdominal pain, diarrhea, constipation, nausea or 

vomiting.


MUSCULOSKELETAL: Denies myalgias.


NEUROLOGIC: Denies numbness, tingling, headacbe or weakness.


ENDOCRINE: Denies fatigue, weight change,  polydipsia or polyurina.


GENITOURINARY: Denies burning, hematuria or urgency with micturation.


HEMATOLOGIC: Denies history of anemia or bleeding. 





PHYSICAL EXAMINATION


Vitals reviewed


CONSTITUTIONAL: No apparent distress. 


HEENT: Head is normocephalic. Pupils are equal, round. Mucous membranes of the 

mouth are dry.  No JVD. No carotid bruit.


CHEST EXAMINATION: Lungs are clear to auscultation. No chest wall tenderness is 

noted on palpation or with deep breathing. 


HEART EXAMINATION: Irregular rate and rhythm. S1, S2 heard. +2/6 systolic murmur

, no gallops or rub.


ABDOMEN: Soft, Positive bowel sounds.


EXTREMITIES: 2+ peripheral pulses, no LE edema


NEUROLOGIC EXAMINATION: No focal deficits noted 





ASSESSMENT


1.  Chronic Afib


2.  Bradycardia with HR's in the 30's on presentation


3.  Lightheadedness, likely from bradycardia


4.  Acute on chronic diastolic heart failure, appears euvolemic s/p Lasix


5.  Moderate to severe mitral regurgitation


6.  Pyuria, questionable UTI vs colonization


7.  Essential hypertension


8.  Mild dementia





PLAN


Hold aricept for completeness as this can cause bradycardia.  Lightheadedness 

likely mainly related to bradycardia.  Monitor on telemetry.  We will discuss 

with EP and patient regarding possible PPM.  Check orthostatics.





Past Medical History


Past Medical History: Atrial Fibrillation, Heart Failure, COPD, GERD/Reflux, GI 

Bleed, Hyperlipidemia, Hypertension


Additional Past Medical History / Comment(s): restless leg syndrome


History of Any Multi-Drug Resistant Organisms: None Reported


Past Surgical History: Appendectomy, Cholecystectomy, Hysterectomy, Joint 

Replacement, Orthopedic Surgery


Additional Past Surgical History / Comment(s): cataracts removed, bilateral 

knees


Past Anesthesia/Blood Transfusion Reactions: No Reported Reaction


Past Psychological History: Anxiety


Smoking Status: Former smoker


Past Alcohol Use History: None Reported


Past Drug Use History: None Reported





- Past Family History


  ** Father


Family Medical History: Coronary Artery Disease (CAD)


Additional Family Medical History / Comment(s): rhemuatic fever,  at 59 of 

"bad heart"





  ** Mother


Family Medical History: Cancer


Additional Family Medical History / Comment(s): uterus





Medications and Allergies


                                Home Medications











 Medication  Instructions  Recorded  Confirmed  Type


 


LORazepam [Ativan] 0.5 mg PO HS PRN 08/02/15 04/21/21 History


 


Omeprazole [PriLOSEC] 20 mg PO AC-BRKFST 08/02/15 04/21/21 History


 


Pravastatin Sodium [Pravachol] 40 mg PO HS 08/02/15 04/21/21 History


 


Rivaroxaban [Xarelto] 15 mg PO HS 08/02/15 04/21/21 History


 


Tiotropium 18 Mcg/Puff [Spiriva] 2 puff INHALATION RT-DAILY 08/02/15 04/21/21 

History


 


rOPINIRole HCL [Requip] 1 mg PO HS 08/02/15 04/21/21 History


 


Cyanocobalamin (Vitamin B-12) 1,000 mcg PO DAILY 19 History





[Vitamin B-12]    


 


Donepezil HCl [Aricept] 10 mg PO DAILY 19 History


 


Latanoprost Ophth [Xalatan 0.005%] 1 drops BOTH EYES HS 19 

History


 


Levothyroxine Sodium [Synthroid] 25 mcg PO DAILY 19 History


 


Spironolactone [Aldactone] 25 mg PO DAILY 19 History


 


Vit C/E/Zn/Coppr/Lutein/Zeaxan 2 cap PO DAILY 19 History





[Preservision Areds 2 Softgel]    


 


lisinopriL [Zestril] 10 mg PO HS 19 History


 


Furosemide [Lasix] 20 mg PO DAILY 21 History








                                    Allergies











Allergy/AdvReac Type Severity Reaction Status Date / Time


 


Penicillins Allergy  Rash/Hives Verified 21 07:27


 


sulfamethoxazole Allergy  Rash/Hives Verified 21 07:27





[From Bactrim]     


 


trimethoprim [From Bactrim] Allergy  Rash/Hives Verified 21 07:27














Physical Exam


Vitals: 


                                   Vital Signs











  Temp Pulse Pulse Resp BP BP Pulse Ox


 


 21 16:00    43 L  18   123/64  100


 


 21 12:00    42 L  18   148/65  100


 


 21 08:45   50 L   18  135/90   100


 


 21 07:56  97.8 F  34 L   18  140/88   100


 


 21 05:26   37 L   18  158/97   94 L


 


 21 03:03  98.6 F  47 L   20  171/69   96








                                Intake and Output











 21





 06:59 14:59 22:59


 


Other:   


 


  Weight 80.739 kg 80.739 kg 














Results





                                 21 03:48





                                 21 03:48


                                 Cardiac Enzymes











  21 Range/Units





  03:48 03:48 07:30 


 


AST  18    (14-36)  U/L


 


Troponin I   <0.012  <0.012  (0.000-0.034)  ng/mL














  21 Range/Units





  09:45 


 


AST   (14-36)  U/L


 


Troponin I  <0.012  (0.000-0.034)  ng/mL








                                   Coagulation











  21 Range/Units





  03:48 


 


PT  13.3 H  (9.0-12.0)  sec


 


APTT  28.5  (22.0-30.0)  sec








                                       CBC











  21 Range/Units





  03:48 


 


WBC  5.4  (3.8-10.6)  k/uL


 


RBC  3.93  (3.80-5.40)  m/uL


 


Hgb  12.2  (11.4-16.0)  gm/dL


 


Hct  35.4  (34.0-46.0)  %


 


Plt Count  191  (150-450)  k/uL








                          Comprehensive Metabolic Panel











  21 Range/Units





  03:48 


 


Sodium  136 L  (137-145)  mmol/L


 


Potassium  4.8  (3.5-5.1)  mmol/L


 


Chloride  103  ()  mmol/L


 


Carbon Dioxide  26  (22-30)  mmol/L


 


BUN  22 H  (7-17)  mg/dL


 


Creatinine  1.09 H  (0.52-1.04)  mg/dL


 


Glucose  108 H  (74-99)  mg/dL


 


Calcium  9.4  (8.4-10.2)  mg/dL


 


AST  18  (14-36)  U/L


 


ALT  8  (4-34)  U/L


 


Alkaline Phosphatase  84  ()  U/L


 


Total Protein  6.2 L  (6.3-8.2)  g/dL


 


Albumin  3.6  (3.5-5.0)  g/dL








                               Current Medications











Generic Name Dose Route Start Last Admin





  Trade Name Freq  PRN Reason Stop Dose Admin


 


Cyanocobalamin  1,000 mcg  21 09:00  21 08:00





  Cyanocobalamin 500 Mcg Tab  PO   1,000 mcg





  DAILY KAMRYN   Administration


 


Donepezil HCl  10 mg  21 09:00  21 08:00





  Donepezil 10 Mg Tab  PO   10 mg





  DAILY KAMRYN   Administration


 


Furosemide  40 mg  21 09:00  21 08:00





  Furosemide 40 Mg Tab  PO   40 mg





  DAILY KAMRYN   Administration


 


Ceftriaxone Sodium 1 gm/  50 mls @ 100 mls/hr  21 12:00  04/21/21 14:36





  Sodium Chloride  IVPB   100 mls/hr





  Q24HR KAMRYN   Administration


 


Latanoprost  1 drops  21 21:00 





  Latanoprost 0.005% Ophth Drops 2.5 Ml Btl  LEFT EYE  





  HS KAMRYN  


 


Levothyroxine Sodium  25 mcg  21 06:30  21 08:00





  Levothyroxine 25 Mcg Tab  PO   25 mcg





  DAILY@0630 KAMRYN   Administration


 


Lisinopril  10 mg  21 21:00 





  Lisinopril 10 Mg Tab  PO  





  HS KAMRYN  


 


Lorazepam  0.5 mg  21 05:33 





  Lorazepam 0.5 Mg Tab  PO  





  HS PRN  





  Anxiety  


 


Naloxone HCl  0.2 mg  21 05:31 





  Naloxone 0.4 Mg/Ml 1 Ml Vial  IV  





  Q2M PRN  





  Opioid Reversal  


 


Pantoprazole Sodium  40 mg  21 07:30  21 07:59





  Pantoprazole 40 Mg Tablet  PO   40 mg





  AC-BRKFST KAMRYN   Administration


 


Pravastatin Sodium  40 mg  21 21:00 





  Pravastatin Sodium 40 Mg Tab  PO  





  HS KARMYN  


 


Rivaroxaban  15 mg  21 21:00 





  Rivaroxaban 15 Mg Tab  PO  





  HS KAMRYN  


 


Senna/Docusate Sodium  2 each  21 09:00  21 08:00





  Sennosides-Docusate Sodium 1 Each Tab  PO   2 each





  BID KAMRYN   Administration


 


Spironolactone  25 mg  21 09:00  21 08:00





  Spironolactone 25 Mg Tab  PO   25 mg





  DAILY KAMRYN   Administration


 


Tiotropium Bromide  2 puff  21 08:00 





  Tiotropium 2.5 Mcg Inhaler  INHALATION  





  RT-DAILY KAMRYN  








                                Intake and Output











 21





 06:59 14:59 22:59


 


Other:   


 


  Weight 80.739 kg 80.739 kg 








                                 Patient Weight











 21





 06:59


 


Weight 80.739 kg








                                        





                                 21 03:48 





                                 21 03:48

## 2021-04-21 NOTE — P.HPIM
History of Present Illness





This is a pleasant 88 years old female with past medical history of atrial 

fibrillation, GI bleed, heart failure, COPD, GERD, hyperlipidemia, hypertension,

restless leg syndrome.  She is a patient of Dr. Patel.  Presents because of 

dyspnea with exertion for 3-4 days with some coughing but no chest pain or 

abdominal pain or something sitting on her chest.


She denies fever.  She has some dizziness but she denies headache or weakness or

numbness.


She is bradycardic with heart rate 34-50.  Risks of vitals are stable and 

patient is afebrile.


Labs show an unremarkable CBC, INR, BMP and liver enzymes.  Creatinine is 1.0 

which is baseline


Troponin is negative 3 with less than 0.012.


TSH is normal 3.5.  Urinalysis is suspicious for infection with cloudy 

appearance, large leukocyte esterase and WBCs 17 I.  Coronavirus not detected


EKG showing atrial fibrillation with slow ventricular response at 39


Chest x-ray:mild vascular congestion possible trace left pleural effusion 


Echocardiogram last year 2020: Ejection fraction 50-55% with mild concentric 

left ventricular hypertrophy.  Moderate mitral regurgitation


Emergency room patient was started on normal saline at 75 mL/h, she continued on

xarelto




















Review of Systems





CONSTITUTIONAL: No fever, no malaise, no fatigue. 


HEENT: No recent visual problems or hearing problems. Denied any sore throat. 


CARDIOVASCULAR: No  orthopnea, PND, no palpitations, no syncope. 


PULMONARY: No chest wall tenderness, no hemoptysis. 


GASTROINTESTINAL: No diarrhea, no nausea, no vomiting, no abdominal pain. 

Normoactive bowel sounds. 


NEUROLOGICAL: No headaches, no weakness, no numbness. 


HEMATOLOGICAL: Denies any bleeding or petechiae. 


GENITOURINARY: Denies any burning micturition, frequency, or urgency. 


MUSCULOSKELETAL/RHEUMATOLOGICAL: Denies any joint pain, swelling, or any muscle 

pain. 


ENDOCRINE: Denies any polyuria or polydipsia.











Past Medical History


Past Medical History: Atrial Fibrillation, Heart Failure, COPD, GERD/Reflux, GI 

Bleed, Hyperlipidemia, Hypertension


Additional Past Medical History / Comment(s): restless leg syndrome


History of Any Multi-Drug Resistant Organisms: None Reported


Past Surgical History: Appendectomy, Cholecystectomy, Hysterectomy, Joint 

Replacement, Orthopedic Surgery


Additional Past Surgical History / Comment(s): cataracts removed, bilateral 

knees


Past Anesthesia/Blood Transfusion Reactions: No Reported Reaction


Past Psychological History: Anxiety


Smoking Status: Former smoker


Past Alcohol Use History: None Reported


Past Drug Use History: None Reported





- Past Family History


  ** Father


Family Medical History: Coronary Artery Disease (CAD)


Additional Family Medical History / Comment(s): rhemuatic fever,  at 59 of 

"bad heart"





  ** Mother


Family Medical History: Cancer


Additional Family Medical History / Comment(s): uterus





Medications and Allergies


                                Home Medications











 Medication  Instructions  Recorded  Confirmed  Type


 


LORazepam [Ativan] 0.5 mg PO HS PRN 08/02/15 04/21/21 History


 


Omeprazole [PriLOSEC] 20 mg PO AC-BRKFST 08/02/15 04/21/21 History


 


Pravastatin Sodium [Pravachol] 40 mg PO HS 08/02/15 04/21/21 History


 


Rivaroxaban [Xarelto] 15 mg PO HS 08/02/15 04/21/21 History


 


Tiotropium 18 Mcg/Puff [Spiriva] 2 puff INHALATION RT-DAILY 08/02/15 04/21/21 

History


 


rOPINIRole HCL [Requip] 1 mg PO HS 08/02/15 04/21/21 History


 


Cyanocobalamin (Vitamin B-12) 1,000 mcg PO DAILY 19 History





[Vitamin B-12]    


 


Donepezil HCl [Aricept] 10 mg PO DAILY 19 History


 


Latanoprost Ophth [Xalatan 0.005%] 1 drops BOTH EYES HS 19 

History


 


Levothyroxine Sodium [Synthroid] 25 mcg PO DAILY 19 History


 


Spironolactone [Aldactone] 25 mg PO DAILY 19 History


 


Vit C/E/Zn/Coppr/Lutein/Zeaxan 2 cap PO DAILY 19 History





[Preservision Areds 2 Softgel]    


 


lisinopriL [Zestril] 10 mg PO HS 19 History


 


Furosemide [Lasix] 20 mg PO DAILY 21 History








                                    Allergies











Allergy/AdvReac Type Severity Reaction Status Date / Time


 


Penicillins Allergy  Rash/Hives Verified 21 07:27


 


sulfamethoxazole Allergy  Rash/Hives Verified 21 07:27





[From Bactrim]     


 


trimethoprim [From Bactrim] Allergy  Rash/Hives Verified 21 07:27














Physical Exam


Vitals: 


                                   Vital Signs











  Temp Pulse Resp BP Pulse Ox


 


 21 08:45   50 L  18  135/90  100


 


 21 07:56  97.8 F  34 L  18  140/88  100


 


 21 05:26   37 L  18  158/97  94 L


 


 21 03:03  98.6 F  47 L  20  171/69  96








                                Intake and Output











 21





 22:59 06:59 14:59


 


Other:   


 


  Weight  80.739 kg 














GENERAL: The patient is alert and oriented x3, not in any acute distress. Well 

developed, well nourished. 


HEENT: Pupils are round and equally reacting to light. EOMI. No scleral icterus.

No conjunctival pallor. Normocephalic, atraumatic. No pharyngeal erythema. No 

thyromegaly. 


CARDIOVASCULAR: S1 and S2 present. No murmurs, rubs, or gallops. 


-PULMONARY: Chest is clear to auscultation, no wheezing .  Bilateral basal 

crepitation


ABDOMEN: Soft, nontender, nondistended, normoactive bowel sounds. No palpable 

organomegaly. 


MUSCULOSKELETAL: No joint swelling or deformity. 


EXTREMITIES: No cyanosis, clubbing, or pedal edema. 


NEUROLOGICAL: Gross neurological examination did not reveal any focal deficits. 


SKIN: No rashes. No petechiae











Results


CBC & Chem 7: 


                                 21 03:48





                                 21 03:48


Labs: 


                  Abnormal Lab Results - Last 24 Hours (Table)











  21 Range/Units





  03:48 03:48 03:48 


 


Lymphocytes #  0.8 L    (1.0-4.8)  k/uL


 


PT   13.3 H   (9.0-12.0)  sec


 


INR   1.3 H   (<1.2)  


 


Sodium    136 L  (137-145)  mmol/L


 


BUN    22 H  (7-17)  mg/dL


 


Creatinine    1.09 H  (0.52-1.04)  mg/dL


 


Glucose    108 H  (74-99)  mg/dL


 


Total Protein    6.2 L  (6.3-8.2)  g/dL


 


Urine Appearance     (Clear)  


 


Ur Leukocyte Esterase     (Negative)  


 


Urine WBC     (0-5)  /hpf


 


Ur Squamous Epith Cells     (0-4)  /hpf


 


Urine Bacteria     (None)  /hpf














  21 Range/Units





  06:45 


 


Lymphocytes #   (1.0-4.8)  k/uL


 


PT   (9.0-12.0)  sec


 


INR   (<1.2)  


 


Sodium   (137-145)  mmol/L


 


BUN   (7-17)  mg/dL


 


Creatinine   (0.52-1.04)  mg/dL


 


Glucose   (74-99)  mg/dL


 


Total Protein   (6.3-8.2)  g/dL


 


Urine Appearance  Cloudy H  (Clear)  


 


Ur Leukocyte Esterase  Large H  (Negative)  


 


Urine WBC  17 H  (0-5)  /hpf


 


Ur Squamous Epith Cells  8 H  (0-4)  /hpf


 


Urine Bacteria  Rare H  (None)  /hpf














Assessment and Plan


Assessment: 





Symptomatic bradycardia 


Chronic A. fib currently with bradycardia 


mild acute on chronic diastolic CHF with ejection fraction 50-55% 


Acute urinary tract infection


Moderate mitral regurgitation


Hypertension


Hyperlipidemia


Chronic kidney disease, stage III


History of GI bleed


History of GERD


Restless leg syndrome











Plan: 





This is a pleasant 82 years old female who presents with A. fib with 

bradycardia.  Continue with telemetry monitoring.  Cardiology consult.  

Discontinue IV fluids.  Give Lasix 20 mg IV 1.  Start ceftriaxone follow-up ur

ine culture


Labs and medication were reviewed..  Continue same treatment.  Continue with 

symptomatic treatment.  Resume home medication.  Monitor lytes and vitals.  DVT 

and GI prophylaxis.  Further recommendations depends on the clinical course of 

the patient


DVT prophylaxis:  xarelto


GI Prophylaxis: Ppi


PT/OT: Pending


Prognosis is guarded

## 2021-04-21 NOTE — XR
EXAM:

  XR Chest, 2 Views

 

CLINICAL HISTORY:

  ITS.REASON XR Reason: dysrhythmia

 

TECHNIQUE:

  Frontal and lateral views of the chest.

 

COMPARISON:

 

1/16/2020

 

IMPRESSION:     

  

Cardiomegaly.  Mild vascular congestion.

Possible trace left pleural effusion.

## 2021-04-21 NOTE — ED
SOB HPI





- General


Chief Complaint: Upper Respiratory Infection


Stated Complaint: Shortness of Breath


Time Seen by Provider: 21 03:02


Source: patient


Mode of arrival: ambulatory


Limitations: no limitations





- History of Present Illness


Initial Comments: 





This patient is an 88-year-old woman who presents with complaint that she is 

feeling short of breath.  She states that seem to come on yesterday in the late 

afternoon.  She states that she was just sitting and reading at the time she 

noticed it.  She states that tonight she was having a hard time sleeping with 

her then therefore they called the ambulance.  The patient denies chest pain, 

diaphoresis, nausea or vomiting.  She has noticed some palpitations that of been

going on for weeks.  She did see a cardiologist and mention this and she states 

that he ordered something for her to wear but it has not arrived yet.  She has 

not noted any change in urination or bowel movements.  No dark tarry or bloody 

stools.  She has not had any leg pain or swelling.


MD Complaint: shortness of breath


Onset/Timin


-: hour(s)


Severity scale (1-10): 0


Consistency: constant


Improves With: nothing


Worsens With: nothing


Associated Symptoms: palpitations


Treatments Prior to Arrival: oxygen





- Related Data


Home Oxygen Therapy: No


                                Home Medications











 Medication  Instructions  Recorded  Confirmed


 


LORazepam [Ativan] 0.5 mg PO HS PRN 08/02/15 01/16/20


 


Omeprazole [PriLOSEC] 20 mg PO AC-BRKFST 08/02/15 01/16/20


 


Pravastatin Sodium [Pravachol] 40 mg PO HS 08/02/15 01/16/20


 


Rivaroxaban [Xarelto] 15 mg PO HS 08/02/15 01/16/20


 


Tiotropium 18 Mcg/Puff [Spiriva] 2 cap INHALATION RT-DAILY 08/02/15 01/16/20


 


rOPINIRole HCL [Requip] 1 mg PO HS 08/02/15 01/16/20


 


Cyanocobalamin (Vitamin B-12) 1,000 mcg PO DAILY 19





[Vitamin B-12]   


 


Donepezil HCl [Aricept] 10 mg PO DAILY 19


 


Latanoprost Ophth [Xalatan 0.005%] 1 drops LEFT EYE HS 19


 


Levothyroxine Sodium [Synthroid] 25 mcg PO DAILY 19


 


Spironolactone [Aldactone] 25 mg PO DAILY 19


 


Vit C/E/Zn/Coppr/Lutein/Zeaxan 1 cap PO DAILY 19





[Preservision Areds 2 Softgel]   


 


lisinopriL [Zestril] 10 mg PO HS 19








                                  Previous Rx's











 Medication  Instructions  Recorded


 


Furosemide [Lasix] 40 mg PO DAILY #30 tab 20


 


Sennosides-Docusate Sodium 2 each PO BID #60 tab 20





[Senokot-S]  


 


guaiFENesin SYRUP 100MG/5ML 200 mg PO Q6H PRN  cup 20





[Robitussin]  


 


predniSONE 10 mg PO AS DIRECTED #30 tab 20











                                    Allergies











Allergy/AdvReac Type Severity Reaction Status Date / Time


 


Penicillins Allergy  Rash/Hives Verified 21 03:06


 


sulfamethoxazole Allergy  Rash/Hives Verified 21 03:06





[From Bactrim]     


 


trimethoprim [From Bactrim] Allergy  Rash/Hives Verified 21 03:06














Review of Systems


ROS Statement: 


Those systems with pertinent positive or pertinent negative responses have been 

documented in the HPI.





ROS Other: All systems not noted in ROS Statement are negative.


Constitutional: Denies: fever, chills


Respiratory: Reports: dyspnea.  Denies: cough, hemoptysis


Cardiovascular: Denies: chest pain, palpitations, orthopnea, edema, syncope


Gastrointestinal: Denies: abdominal pain, nausea, vomiting, diarrhea, melena, 

hematochezia


Genitourinary: Denies: dysuria, hematuria


Musculoskeletal: Denies: back pain


Skin: Denies: rash


Neurological: Denies: headache, weakness





Past Medical History


Past Medical History: Atrial Fibrillation, Heart Failure, COPD, GERD/Reflux, GI 

Bleed, Hyperlipidemia, Hypertension


Additional Past Medical History / Comment(s): restless leg syndrome


History of Any Multi-Drug Resistant Organisms: None Reported


Past Surgical History: Appendectomy, Cholecystectomy, Hysterectomy, Joint 

Replacement, Orthopedic Surgery


Additional Past Surgical History / Comment(s): cataracts removed, bilateral 

knees


Past Anesthesia/Blood Transfusion Reactions: No Reported Reaction


Past Psychological History: Anxiety


Smoking Status: Former smoker


Past Alcohol Use History: None Reported


Past Drug Use History: None Reported





- Past Family History


  ** Father


Family Medical History: Coronary Artery Disease (CAD)


Additional Family Medical History / Comment(s): rhemuatic fever,  at 59 of 

"bad heart"





  ** Mother


Family Medical History: Cancer


Additional Family Medical History / Comment(s): uterus





General Exam


Limitations: no limitations


General appearance: alert, in no apparent distress


Head exam: Present: atraumatic, normocephalic


Eye exam: Present: normal appearance.  Absent: scleral icterus, conjunctival 

injection


ENT exam: Present: normal oropharynx


Neck exam: Present: normal inspection


Respiratory exam: Present: normal lung sounds bilaterally.  Absent: respiratory 

distress, wheezes, rales, rhonchi, stridor, accessory muscle use, decreased 

breath sounds, prolonged expiratory


Cardiovascular Exam: Present: bradycardia, irregular rhythm, normal heart 

sounds.  Absent: systolic murmur, diastolic murmur, rubs, gallop


GI/Abdominal exam: Present: soft.  Absent: distended, tenderness, guarding, 

rebound, rigid, mass


Extremities exam: Present: normal inspection, normal capillary refill.  Absent: 

pedal edema, calf tenderness


Back exam: Present: normal inspection


Neurological exam: Present: alert


Skin exam: Present: warm, dry, intact, normal color.  Absent: rash





Course


                                   Vital Signs











  21





  03:03 05:26


 


Temperature 98.6 F 


 


Pulse Rate 47 L 37 L


 


Respiratory 20 18





Rate  


 


Blood Pressure 171/69 158/97


 


O2 Sat by Pulse 96 94 L





Oximetry  














Medical Decision Making





- Lab Data


Result diagrams: 


                                 21 03:48





                                 21 03:48


                                   Lab Results











  21 Range/Units





  03:48 03:48 03:48 


 


WBC  5.4    (3.8-10.6)  k/uL


 


RBC  3.93    (3.80-5.40)  m/uL


 


Hgb  12.2    (11.4-16.0)  gm/dL


 


Hct  35.4    (34.0-46.0)  %


 


MCV  90.0    (80.0-100.0)  fL


 


MCH  31.1    (25.0-35.0)  pg


 


MCHC  34.5    (31.0-37.0)  g/dL


 


RDW  12.5    (11.5-15.5)  %


 


Plt Count  191    (150-450)  k/uL


 


MPV  7.3    


 


Neutrophils %  73    %


 


Lymphocytes %  15    %


 


Monocytes %  7    %


 


Eosinophils %  2    %


 


Basophils %  0    %


 


Neutrophils #  4.0    (1.3-7.7)  k/uL


 


Lymphocytes #  0.8 L    (1.0-4.8)  k/uL


 


Monocytes #  0.4    (0-1.0)  k/uL


 


Eosinophils #  0.1    (0-0.7)  k/uL


 


Basophils #  0.0    (0-0.2)  k/uL


 


PT   13.3 H   (9.0-12.0)  sec


 


INR   1.3 H   (<1.2)  


 


APTT   28.5   (22.0-30.0)  sec


 


Sodium    136 L  (137-145)  mmol/L


 


Potassium    4.8  (3.5-5.1)  mmol/L


 


Chloride    103  ()  mmol/L


 


Carbon Dioxide    26  (22-30)  mmol/L


 


Anion Gap    7  mmol/L


 


BUN    22 H  (7-17)  mg/dL


 


Creatinine    1.09 H  (0.52-1.04)  mg/dL


 


Est GFR (CKD-EPI)AfAm    53  (>60 ml/min/1.73 sqM)  


 


Est GFR (CKD-EPI)NonAf    46  (>60 ml/min/1.73 sqM)  


 


Glucose    108 H  (74-99)  mg/dL


 


Calcium    9.4  (8.4-10.2)  mg/dL


 


Magnesium    2.0  (1.6-2.3)  mg/dL


 


Total Bilirubin    0.6  (0.2-1.3)  mg/dL


 


AST    18  (14-36)  U/L


 


ALT    8  (4-34)  U/L


 


Alkaline Phosphatase    84  ()  U/L


 


Troponin I     (0.000-0.034)  ng/mL


 


Total Protein    6.2 L  (6.3-8.2)  g/dL


 


Albumin    3.6  (3.5-5.0)  g/dL


 


TSH    3.570  (0.465-4.680)  mIU/L


 


Coronavirus (PCR)     (Not Detectd)  














  21 Range/Units





  03:48 03:48 


 


WBC    (3.8-10.6)  k/uL


 


RBC    (3.80-5.40)  m/uL


 


Hgb    (11.4-16.0)  gm/dL


 


Hct    (34.0-46.0)  %


 


MCV    (80.0-100.0)  fL


 


MCH    (25.0-35.0)  pg


 


MCHC    (31.0-37.0)  g/dL


 


RDW    (11.5-15.5)  %


 


Plt Count    (150-450)  k/uL


 


MPV    


 


Neutrophils %    %


 


Lymphocytes %    %


 


Monocytes %    %


 


Eosinophils %    %


 


Basophils %    %


 


Neutrophils #    (1.3-7.7)  k/uL


 


Lymphocytes #    (1.0-4.8)  k/uL


 


Monocytes #    (0-1.0)  k/uL


 


Eosinophils #    (0-0.7)  k/uL


 


Basophils #    (0-0.2)  k/uL


 


PT    (9.0-12.0)  sec


 


INR    (<1.2)  


 


APTT    (22.0-30.0)  sec


 


Sodium    (137-145)  mmol/L


 


Potassium    (3.5-5.1)  mmol/L


 


Chloride    ()  mmol/L


 


Carbon Dioxide    (22-30)  mmol/L


 


Anion Gap    mmol/L


 


BUN    (7-17)  mg/dL


 


Creatinine    (0.52-1.04)  mg/dL


 


Est GFR (CKD-EPI)AfAm    (>60 ml/min/1.73 sqM)  


 


Est GFR (CKD-EPI)NonAf    (>60 ml/min/1.73 sqM)  


 


Glucose    (74-99)  mg/dL


 


Calcium    (8.4-10.2)  mg/dL


 


Magnesium    (1.6-2.3)  mg/dL


 


Total Bilirubin    (0.2-1.3)  mg/dL


 


AST    (14-36)  U/L


 


ALT    (4-34)  U/L


 


Alkaline Phosphatase    ()  U/L


 


Troponin I  <0.012   (0.000-0.034)  ng/mL


 


Total Protein    (6.3-8.2)  g/dL


 


Albumin    (3.5-5.0)  g/dL


 


TSH    (0.465-4.680)  mIU/L


 


Coronavirus (PCR)   Not Detected  (Not Detectd)  














- EKG Data


-: EKG Interpreted by Me


EKG shows normal: axis (Normal), intervals (Normal), QRS complexes (Normal)


Rate: bradycardia (Rate approximately 39 bpm)


Interpretation: other (The underlying rhythm appears to be atrial fibrillation 

with a slow ventricular response.)





Disposition


Clinical Impression: 


 Atrial fibrillation with slow ventricular response, Symptomatic bradycardia





Disposition: ADMITTED AS IP TO THIS HOSP


Condition: Fair


Is patient prescribed a controlled substance at d/c from ED?: No


Referrals: 


Kurt Patel DO [Primary Care Provider] - 1-2 days

## 2021-04-22 RX ADMIN — LEVOTHYROXINE SODIUM SCH MCG: 25 TABLET ORAL at 07:01

## 2021-04-22 RX ADMIN — CYANOCOBALAMIN TAB 500 MCG SCH MCG: 500 TAB at 10:26

## 2021-04-22 RX ADMIN — DONEPEZIL HYDROCHLORIDE SCH MG: 10 TABLET ORAL at 10:26

## 2021-04-22 RX ADMIN — TIOTROPIUM BROMIDE INHALATION SPRAY SCH: 3.12 SPRAY, METERED RESPIRATORY (INHALATION) at 19:08

## 2021-04-22 RX ADMIN — DOCUSATE SODIUM AND SENNOSIDES SCH: 50; 8.6 TABLET ORAL at 20:41

## 2021-04-22 RX ADMIN — FUROSEMIDE SCH MG: 40 TABLET ORAL at 10:26

## 2021-04-22 RX ADMIN — SPIRONOLACTONE SCH MG: 25 TABLET, FILM COATED ORAL at 10:26

## 2021-04-22 RX ADMIN — DOCUSATE SODIUM AND SENNOSIDES SCH: 50; 8.6 TABLET ORAL at 10:26

## 2021-04-22 RX ADMIN — PANTOPRAZOLE SODIUM SCH MG: 40 TABLET, DELAYED RELEASE ORAL at 10:26

## 2021-04-22 RX ADMIN — PRAVASTATIN SODIUM SCH MG: 40 TABLET ORAL at 20:41

## 2021-04-22 RX ADMIN — LATANOPROST SCH DROPS: 50 SOLUTION OPHTHALMIC at 20:41

## 2021-04-22 NOTE — P.PN
Subjective





HISTORY OF PRESENTING ILLNESS


Patient is a pleasant 89 yo female with history of mild dementia, chronic Afib, 

HLD, HTN, diastolic heart failure, pulmonary hypertension, tricuspid 

regurgitation.  I had recently seen patient in the office 4/16 where she had 

been complaining of increasing episodes of lightheadedness which can occur 

mainly with walking.  She has always been somewhat unsteady on her feet walking 

with a walker however now has noted feeling more lightheaded.  We therefore were

making arrangements for 1 week monitor as this was happening on a daily basis.  

She also had a recent echo in the office 3/23/2021 which showed EF 55% grade 3 

diastolic dysfunction, PFO, moderate to severe MR, moderate TR, RVSP 63.  She 

has been doing well on her anticoagulation with Xarelto.  She has not been on 

any rate contolling medications and only Aricept that may cause mild 

bradycardia.  She has been having persistent lightheadedness and therefore came 

to ER where initial EKG showed Afib with HR in the 30's at 39.  She denies any 

syncope.  She does have some chronic COBB.  She initially was given some IVF then

given Lasix 20mg IV.   





DIAGNOSTICS


Troponin normal x 3, Cr 1.09, BUN 22, TSH 3.5





4/22


Patient seen and examined.  Orthostatic vitals not performed.  Telemetry 

reviewed with occasional heart rates in the high 30s and mainly in the 40s.  She

denies any chest pain or pressure.  She still does get lightheaded standing up 

walking to the bathroom.  No syncopal episodes.  Aricept has been held.





REVIEW OF SYSTEMS


At the time of my exam:


CONSTITUTIONAL: +weakness. Denies fever or chills.


CARDIOVASCULAR: Denies chest pain, +mild chronic shortness of breath, no 

orthopnea, PND or palpitations.


RESPIRATORY: Denies cough. 


GASTROINTESTINAL: Denies abdominal pain, diarrhea, constipation, nausea or 

vomiting.


MUSCULOSKELETAL: Denies myalgias.


NEUROLOGIC: Denies numbness, tingling, headacbe or weakness.


ENDOCRINE: Denies fatigue, weight change,  polydipsia or polyurina.


GENITOURINARY: Denies burning, hematuria or urgency with micturation.


HEMATOLOGIC: Denies history of anemia or bleeding. 





PHYSICAL EXAMINATION


Vitals reviewed


CONSTITUTIONAL: No apparent distress. 


HEENT: Head is normocephalic. Pupils are equal, round. Mucous membranes of the 

mouth are dry.  No JVD. No carotid bruit.


CHEST EXAMINATION: Lungs are clear to auscultation. No chest wall tenderness is 

noted on palpation or with deep breathing. 


HEART EXAMINATION: Irregular rate and rhythm. S1, S2 heard. +2/6 systolic 

murmur, no gallops or rub.


ABDOMEN: Soft, Positive bowel sounds.


EXTREMITIES: 2+ peripheral pulses, no LE edema


NEUROLOGIC EXAMINATION: No focal deficits noted 





ASSESSMENT


1.  Chronic Afib


2.  Symptomatic bradycardia


3.  Lightheadedness


4.  Acute on chronic diastolic heart failure, appears euvolemic s/p Lasix


5.  Moderate to severe mitral regurgitation


6.  Pyuria, questionable UTI vs more likely colonization


7.  Essential hypertension


8.  Mild dementia





PLAN


Patient appears to be symptomatic from her bradycardia with continued heart 

rates in the 30s to 40s.  She still is lightheaded and discussed risks and 

benefits of permanent pacemaker.  Discussed with electrophysiology and we will 

make arrangements for permanent pacemaker placement, likely will need his bundle

pacing.





Objective





- Vital Signs


Vital signs: 


                                   Vital Signs











Temp  97.7 F   04/22/21 08:02


 


Pulse  48 L  04/22/21 09:04


 


Resp  14   04/22/21 09:04


 


BP  152/59   04/22/21 08:02


 


Pulse Ox  94 L  04/22/21 08:02








                                 Intake & Output











 04/21/21 04/22/21 04/22/21





 18:59 06:59 18:59


 


Intake Total 365 125 240


 


Balance 365 125 240


 


Weight 80.739 kg 77 kg 


 


Intake:   


 


  Oral 365 125 240


 


Other:   


 


  Voiding Method Toilet Toilet Toilet


 


  # Voids 2 1 














- Labs


CBC & Chem 7: 


                                 04/21/21 03:48





                                 04/21/21 03:48


Labs: 


                      Microbiology - Last 24 Hours (Table)











 04/21/21 23:45 Urine Culture - Preliminary





 Urine,Voided

## 2021-04-22 NOTE — P.PN
Subjective





This is a pleasant 88 years old female with past medical history of atrial 

fibrillation, GI bleed, heart failure, COPD, GERD, hyperlipidemia, hypertension,

restless leg syndrome.  She is a patient of Dr. Patel.  Presents because of 

dyspnea with exertion for 3-4 days with some coughing but no chest pain or 

abdominal pain or something sitting on her chest.


She denies fever.  She has some dizziness but she denies headache or weakness or

numbness.


She is bradycardic with heart rate 34-50.  Risks of vitals are stable and 

patient is afebrile.


Labs show an unremarkable CBC, INR, BMP and liver enzymes.  Creatinine is 1.0 

which is baseline


Troponin is negative 3 with less than 0.012.


TSH is normal 3.5.  Urinalysis is suspicious for infection with cloudy 

appearance, large leukocyte esterase and WBCs 17 I.  Coronavirus not detected


EKG showing atrial fibrillation with slow ventricular response at 39


Chest x-ray:mild vascular congestion possible trace left pleural effusion 


Echocardiogram last year 1/2020: Ejection fraction 50-55% with mild concentric 

left ventricular hypertrophy.  Moderate mitral regurgitation


Emergency room patient was started on normal saline at 75 mL/h, she continued on

xarelto





04/22/2021


Patient states that she's been complaining of from lightheadedness most of the 

day.  She reports increased frequency of urination but this been chronic for the

last 6 months.  No dysuria or urgency.  UTI was diagnosed upon admission however

repeat urine analysis looks clean, ceftriaxone stopped.


Discussed the case with cardiology team and plan for possible pacemaker 

placement given her symptomatic bradycardia.


Heart rate is in the 40s.


Patient is already on home dose of Xarelto and Lasix





Objective





- Vital Signs


Vital signs: 


                                   Vital Signs











Temp  97.7 F   04/22/21 08:02


 


Pulse  48 L  04/22/21 09:04


 


Resp  14   04/22/21 09:04


 


BP  152/59   04/22/21 08:02


 


Pulse Ox  94 L  04/22/21 08:02








                                 Intake & Output











 04/21/21 04/22/21 04/22/21





 18:59 06:59 18:59


 


Intake Total 365 125 240


 


Balance 365 125 240


 


Weight 80.739 kg 77 kg 


 


Intake:   


 


  Oral 365 125 240


 


Other:   


 


  Voiding Method Toilet Toilet Toilet


 


  # Voids 2 1 














- Exam





GENERAL: The patient is alert and oriented x3, not in any acute distress. Well d

eveloped, well nourished. 


HEENT: Pupils are round and equally reacting to light. EOMI. No scleral icterus.

No conjunctival pallor. Normocephalic, atraumatic. No pharyngeal erythema. No 

thyromegaly. 


CARDIOVASCULAR: S1 and S2 present. No murmurs, rubs, or gallops. 


PULMONARY: Chest is clear to auscultation, no wheezing or crackles. 


ABDOMEN: Soft, nontender, nondistended, normoactive bowel sounds. No palpable 

organomegaly. 


MUSCULOSKELETAL: No joint swelling or deformity. 


EXTREMITIES: No cyanosis, clubbing, or pedal edema. 


NEUROLOGICAL: Gross neurological examination did not reveal any focal deficits. 


SKIN: No rashes. no petechiae.





- Labs


CBC & Chem 7: 


                                 04/21/21 03:48





                                 04/21/21 03:48


Labs: 


                      Microbiology - Last 24 Hours (Table)











 04/21/21 23:45 Urine Culture - Preliminary





 Urine,Voided 














Assessment and Plan


Assessment: 





Symptomatic bradycardia 


Chronic A. fib currently with bradycardia 


mild acute on chronic diastolic CHF with ejection fraction 50-55% 


Acute urinary tract infection, resolved with treatment


Moderate mitral regurgitation


Hypertension


Hyperlipidemia


Chronic kidney disease, stage III


History of GI bleed


History of GERD


Restless leg syndrome











Plan: 





This is a pleasant 82 years old female who presents with A. fib with bradycar

wagner.  Continue with telemetry monitoring.  Cardiology consult.  Discontinue IV 

fluids.  Give Lasix 20 mg IV 1.  Discontinue ceftriaxone follow-up urine 

culture


Labs and medication were reviewed..  Continue same treatment.  Continue with 

symptomatic treatment.  Resume home medication.  Monitor lytes and vitals.  DVT 

and GI prophylaxis.  Further recommendations depends on the clinical course of 

the patient


DVT prophylaxis:  xarelto


GI Prophylaxis: Ppi


PT/OT: Pending


Prognosis is guarded

## 2021-04-23 LAB — GLUCOSE BLD-MCNC: 98 MG/DL (ref 75–99)

## 2021-04-23 PROCEDURE — 02HK3JZ INSERTION OF PACEMAKER LEAD INTO RIGHT VENTRICLE, PERCUTANEOUS APPROACH: ICD-10-PCS

## 2021-04-23 PROCEDURE — 02HL3JZ INSERTION OF PACEMAKER LEAD INTO LEFT VENTRICLE, PERCUTANEOUS APPROACH: ICD-10-PCS

## 2021-04-23 PROCEDURE — 0JH607Z INSERTION OF CARDIAC RESYNCHRONIZATION PACEMAKER PULSE GENERATOR INTO CHEST SUBCUTANEOUS TISSUE AND FASCIA, OPEN APPROACH: ICD-10-PCS

## 2021-04-23 RX ADMIN — LEVOTHYROXINE SODIUM SCH MCG: 25 TABLET ORAL at 06:11

## 2021-04-23 RX ADMIN — SODIUM CHLORIDE, PRESERVATIVE FREE SCH ML: 5 INJECTION INTRAVENOUS at 13:03

## 2021-04-23 RX ADMIN — FUROSEMIDE SCH MG: 40 TABLET ORAL at 13:03

## 2021-04-23 RX ADMIN — DOCUSATE SODIUM AND SENNOSIDES SCH EACH: 50; 8.6 TABLET ORAL at 13:03

## 2021-04-23 RX ADMIN — SODIUM CHLORIDE, PRESERVATIVE FREE SCH ML: 5 INJECTION INTRAVENOUS at 21:50

## 2021-04-23 RX ADMIN — DOCUSATE SODIUM AND SENNOSIDES SCH: 50; 8.6 TABLET ORAL at 22:26

## 2021-04-23 RX ADMIN — DONEPEZIL HYDROCHLORIDE SCH MG: 10 TABLET ORAL at 13:03

## 2021-04-23 RX ADMIN — PRAVASTATIN SODIUM SCH MG: 40 TABLET ORAL at 21:50

## 2021-04-23 RX ADMIN — TIOTROPIUM BROMIDE INHALATION SPRAY SCH: 3.12 SPRAY, METERED RESPIRATORY (INHALATION) at 07:43

## 2021-04-23 RX ADMIN — SPIRONOLACTONE SCH MG: 25 TABLET, FILM COATED ORAL at 13:03

## 2021-04-23 RX ADMIN — DEXTROSE SCH MLS/HR: 50 INJECTION, SOLUTION INTRAVENOUS at 13:37

## 2021-04-23 RX ADMIN — CEFAZOLIN SCH MLS/HR: 330 INJECTION, POWDER, FOR SOLUTION INTRAMUSCULAR; INTRAVENOUS at 06:11

## 2021-04-23 RX ADMIN — TIOTROPIUM BROMIDE INHALATION SPRAY SCH: 3.12 SPRAY, METERED RESPIRATORY (INHALATION) at 18:26

## 2021-04-23 RX ADMIN — DEXTROSE SCH MLS/HR: 50 INJECTION, SOLUTION INTRAVENOUS at 21:49

## 2021-04-23 RX ADMIN — CYANOCOBALAMIN TAB 500 MCG SCH MCG: 500 TAB at 06:11

## 2021-04-23 RX ADMIN — PANTOPRAZOLE SODIUM SCH MG: 40 TABLET, DELAYED RELEASE ORAL at 06:11

## 2021-04-23 RX ADMIN — CEFAZOLIN SCH: 330 INJECTION, POWDER, FOR SOLUTION INTRAMUSCULAR; INTRAVENOUS at 07:43

## 2021-04-23 RX ADMIN — LATANOPROST SCH DROPS: 50 SOLUTION OPHTHALMIC at 21:50

## 2021-04-23 NOTE — P.PCN
Preoperative Diagnosis: 


Left upper extremity venogram


15 mL every dye injected in the left arm


Patent left subclavian and left axillary venous system





Plan


Proceed with biventricular pacing for atrial fibrillation with bradycardia and 

anticipated RV pacing percentage of greater than 40%








Increase procedural services


Patient underwent implantation of a biventricular pacemaker line she has atrial 

fibrillation with a slow ventricular response and diastolic congestive heart 

failure with restrictive physiology.  Anticipated RV pacing is greater than 40, 

more like 100%


She underwent implantation of an RV lead as well as a His bundle lead


She has significant biatrial enlargement


Right atrium and tricuspid valve significantly enlarged


While we were able to mapped is and obtain a selective capture as well as 

nonselective capture several times, the lead was unstable and would dislodge


The sheath would not reach the tricuspid valve completely on account of the size

of the right atrium and the tricuspid valve


It took multiple attempts to obtain a stable position for the history lead





This was successfully accomplished and while the lead was within the sheath 

threshold was about 2.5 V for nonselective capture


After the sheath was removed the threshold increased to 3.25 V





RV thresholds were excellent


Him extra long RV lead was ordered specifically for this patient to accommodate 

the size of the right atrium


Thresholds were excellent





A biventricular pacemaker was implanted with physiologic septal pacing





The pacemaker has been programmed to VVIR 50 to 1:30 bpm


His bundle pacing preferentially at 5 V at 1 ms


Backup RV pacing 80 ms following His bundle pacing

## 2021-04-23 NOTE — P.PN
Subjective





This is a pleasant 88 years old female with past medical history of atrial 

fibrillation, GI bleed, heart failure, COPD, GERD, hyperlipidemia, hypertension,

restless leg syndrome.  She is a patient of Dr. Patel.  Presents because of 

dyspnea with exertion for 3-4 days with some coughing but no chest pain or 

abdominal pain or something sitting on her chest.


She denies fever.  She has some dizziness but she denies headache or weakness or

numbness.


She is bradycardic with heart rate 34-50.  Risks of vitals are stable and 

patient is afebrile.


Labs show an unremarkable CBC, INR, BMP and liver enzymes.  Creatinine is 1.0 

which is baseline


Troponin is negative 3 with less than 0.012.


TSH is normal 3.5.  Urinalysis is suspicious for infection with cloudy 

appearance, large leukocyte esterase and WBCs 17 I.  Coronavirus not detected


EKG showing atrial fibrillation with slow ventricular response at 39


Chest x-ray:mild vascular congestion possible trace left pleural effusion 


Echocardiogram last year 1/2020: Ejection fraction 50-55% with mild concentric 

left ventricular hypertrophy.  Moderate mitral regurgitation


Emergency room patient was started on normal saline at 75 mL/h, she continued on

xarelto





04/22/2021


Patient states that she's been complaining of from lightheadedness most of the 

day.  She reports increased frequency of urination but this been chronic for the

last 6 months.  No dysuria or urgency.  UTI was diagnosed upon admission however

repeat urine analysis looks clean, ceftriaxone stopped.


Discussed the case with cardiology team and plan for possible pacemaker 

placement given her symptomatic bradycardia.


Heart rate is in the 40s.


Patient is already on home dose of Xarelto and Lasix





04/23/2021


Patient underwent pacemaker placement today.  Patient tolerated the procedure 

well.


Discussed the case with cardiology service, we can start Xarelto tonight and 

monitor hemoglobin and site of pacemaker tomorrow to make sure there is no 

bleeding complications.


Patient is afebrile more than 48 hours, no leukocytosis.  The probeconsult tone

is normal at 0.09.  Urine culture showed no growth.  Because of this I don't 

think the patient has UTI and antibiotic ceftriaxone was stopped.  


She has chronic increased frequency of urination about 6 months, discussed with 

staff to check for bladder scan




















Objective





- Vital Signs


Vital signs: 


                                   Vital Signs











Temp  97.4 F L  04/23/21 11:05


 


Pulse  69   04/23/21 10:39


 


Resp  16   04/23/21 11:05


 


BP  155/87   04/23/21 11:05


 


Pulse Ox  97   04/23/21 11:05








                                 Intake & Output











 04/22/21 04/23/21 04/23/21





 18:59 06:59 18:59


 


Intake Total 480  450


 


Output Total 300  


 


Balance 180  450


 


Weight  77 kg 


 


Intake:   


 


  IV   450


 


  Oral 480  


 


Output:   


 


  Urine 300  


 


Other:   


 


  Voiding Method Toilet Toilet 


 


  # Voids  1 














- Exam





GENERAL: The patient is alert and oriented x3, not in any acute distress. Well 

developed, well nourished. 


HEENT: Pupils are round and equally reacting to light. EOMI. No scleral icterus.

No conjunctival pallor. Normocephalic, atraumatic. No pharyngeal erythema. No 

thyromegaly. 


CARDIOVASCULAR: S1 and S2 present. No murmurs, rubs, or gallops. 


PULMONARY: Chest is clear to auscultation, no wheezing or crackles. 


ABDOMEN: Soft, nontender, nondistended, normoactive bowel sounds. No palpable 

organomegaly. 


MUSCULOSKELETAL: No joint swelling or deformity. 


EXTREMITIES: No cyanosis, clubbing, or pedal edema. 


NEUROLOGICAL: Gross neurological examination did not reveal any focal deficits. 


SKIN: No rashes. no petechiae.





- Labs


CBC & Chem 7: 


                                 04/21/21 03:48





                                 04/21/21 03:48


Labs: 


                      Microbiology - Last 24 Hours (Table)











 04/21/21 23:45 Urine Culture - Final





 Urine,Voided 














Assessment and Plan


Assessment: 





Symptomatic bradycardia , status post pacemaker placement on 4/23.  Today postop

day #0


Chronic A. fib currently with bradycardia 


mild acute on chronic diastolic CHF with ejection fraction 50-55% , resolved


Acute urinary tract infection, resolved with treatment.  No need for antibiotic.

 


Chronic increased frequency of urination for 6-12 months.  Check bladder scan


Moderate mitral regurgitation


Hypertension


Hyperlipidemia


Chronic kidney disease, stage III


History of GI bleed


History of GERD


Restless leg syndrome











Plan: 





This is a pleasant 82 years old female who presents with A. fib with 

bradycardia.  She is status post pacemaker.  Continue with telemetry monitoring.

 Cardiology consult.  Discontinue ceftriaxone as patient has no UTI currently


Resume Xarelto and monitor pacemaker site and hemoglobin tomorrow


Discussed with bed side nurse to check bladder scan


Labs and medication were reviewed..  Continue same treatment.  Continue with 

symptomatic treatment.  Resume home medication.  Monitor lytes and vitals.  DVT 

and GI prophylaxis.  Further recommendations depends on the clinical course of 

the patient


DVT prophylaxis:  xarelto


GI Prophylaxis: Ppi


PT/OT: Pending


Prognosis is guarded

## 2021-04-23 NOTE — XR
EXAMINATION TYPE: XR chest 1V portable

 

DATE OF EXAM: 4/23/2021

 

Comparison: 4/21/2021

 

Clinical History: 88-year-old female postpacemaker Lead placement check

 

Findings:

Heart is enlarged. Left anterior chest wall pacemaker generator with right ventricular lead. A second
 lead may be located within a dilated right atrium and should be correlated clinically. Diffuse inter
stitial prominence.

 

 

Impression:

 

1. Left-sided 2-lead pacemaker generator. There is a right ventricular lead. The second lead may be l
ocated within a dilated right atrium and needs to be correlated clinically.

2. Cardiomegaly and slightly increased diffuse interstitial changes, correlate for pulmonary vascular
 congestion.

## 2021-04-23 NOTE — CE
CARDIAC ELECTROPHYSIOLOGY REPORT



This is an 88-year-old female who presented with symptomatic bradycardia.  She has

permanent atrial fibrillation, significant biatrial enlargement with restrictive

physiology and diastolic heart failure.  She was very short of breath when she arrived.



She underwent a biventricular pacemaker implantation to avoid RV pacing.  Since

anticipated RV pacing percentage is close to 100%, with a single-chamber device.



The patient is brought to the EP lab in a fasting state. Written informed consent was

obtained prior to the procedure.  The left shoulder area was prepped and draped as per

protocol and 1% lidocaine was used for local anesthesia.  A 4 cm incision was made

parallel to the deltopectoral groove, about 1.5 cm medial to it.  The incision was

carried down to the level of the pectoralis muscle.  A subfascial pocket was made.

Hemostasis was assured.  The left axillary vein was accessed at 2 separate points under

fluoroscopy and via appropriately-sized introducer sheath first an RV lead was

positioned.



We specially ordered a longer RV lead of 65 cm length, model #5076 and serial number

[id number], [employer].  This was screwed in the RV apex.  R-waves 9.5 mV, pacing

impedance 855 ohms, pacing threshold 0.3 V at 0.5 milliseconds. Ten volte test

negative.



The His bundle sheath was placed and it appeared to reach the tricuspid anulus.  We

were able to map the His bundle with 3830 lead and obtain both selective capture at

times and other times nonselective capture.  However, the lead was unstable and upon

deploying the screw, it would dislodge.



Multiple attempts were made and finally we were able to obtain a stable position with

nonselective capture.



The loss of nonselective capture initially was at 2.5 V at 1 millisecond but after

removal of the sheath, it increased to 3.25 V at 1 millisecond, but it remained stable

thereafter.



Complete loss of capture occurred at 1 V at 1 millisecond for the His lead.

The leads and the sheaths were removed.  The leads were secured to the underlying

pectoralis muscle using 2 nonabsorbable sutures.



They were connected to the generator.  The atrial port was planned.



The device was then implanted and secured to the underlying pectoralis muscle.

This was a [employer], Teagan CRT-P MRI model number #W1TR02, serial #[id number].



The leads and generator were then placed in subfascial pocket and the wound was closed

in 3 layers and dressed per protocol.



The device was programmed to DDDR  ppm with preferential His bundle

pacing/physiologic septal pacing to avoid RV pacing.



Backup RV pacing 80 milliseconds after the His bundle pacing.



The patient tolerated the procedure well without any acute complications.





MMSHILPI / ELSAN: [id number] / Job#: [id number]

## 2021-04-24 LAB
BASOPHILS # BLD AUTO: 0 K/UL (ref 0–0.2)
BASOPHILS NFR BLD AUTO: 1 %
EOSINOPHIL # BLD AUTO: 0.1 K/UL (ref 0–0.7)
EOSINOPHIL NFR BLD AUTO: 2 %
ERYTHROCYTE [DISTWIDTH] IN BLOOD BY AUTOMATED COUNT: 4.23 M/UL (ref 3.8–5.4)
ERYTHROCYTE [DISTWIDTH] IN BLOOD: 12.5 % (ref 11.5–15.5)
HCT VFR BLD AUTO: 38.4 % (ref 34–46)
HGB BLD-MCNC: 12.6 GM/DL (ref 11.4–16)
LYMPHOCYTES # SPEC AUTO: 0.4 K/UL (ref 1–4.8)
LYMPHOCYTES NFR SPEC AUTO: 8 %
MCH RBC QN AUTO: 29.9 PG (ref 25–35)
MCHC RBC AUTO-ENTMCNC: 32.9 G/DL (ref 31–37)
MCV RBC AUTO: 90.8 FL (ref 80–100)
MONOCYTES # BLD AUTO: 0.3 K/UL (ref 0–1)
MONOCYTES NFR BLD AUTO: 6 %
NEUTROPHILS # BLD AUTO: 4.3 K/UL (ref 1.3–7.7)
NEUTROPHILS NFR BLD AUTO: 83 %
PLATELET # BLD AUTO: 185 K/UL (ref 150–450)
WBC # BLD AUTO: 5.2 K/UL (ref 3.8–10.6)

## 2021-04-24 RX ADMIN — FUROSEMIDE SCH MG: 40 TABLET ORAL at 08:55

## 2021-04-24 RX ADMIN — DONEPEZIL HYDROCHLORIDE SCH MG: 10 TABLET ORAL at 08:55

## 2021-04-24 RX ADMIN — SODIUM CHLORIDE, PRESERVATIVE FREE SCH ML: 5 INJECTION INTRAVENOUS at 19:42

## 2021-04-24 RX ADMIN — PRAVASTATIN SODIUM SCH MG: 40 TABLET ORAL at 19:41

## 2021-04-24 RX ADMIN — DOCUSATE SODIUM AND SENNOSIDES SCH: 50; 8.6 TABLET ORAL at 19:49

## 2021-04-24 RX ADMIN — DEXTROSE SCH MLS/HR: 50 INJECTION, SOLUTION INTRAVENOUS at 01:53

## 2021-04-24 RX ADMIN — DOCUSATE SODIUM AND SENNOSIDES SCH: 50; 8.6 TABLET ORAL at 08:56

## 2021-04-24 RX ADMIN — TIOTROPIUM BROMIDE INHALATION SPRAY SCH PUFF: 3.12 SPRAY, METERED RESPIRATORY (INHALATION) at 10:27

## 2021-04-24 RX ADMIN — SODIUM CHLORIDE, PRESERVATIVE FREE SCH ML: 5 INJECTION INTRAVENOUS at 08:55

## 2021-04-24 RX ADMIN — PANTOPRAZOLE SODIUM SCH MG: 40 TABLET, DELAYED RELEASE ORAL at 06:39

## 2021-04-24 RX ADMIN — LEVOTHYROXINE SODIUM SCH MCG: 25 TABLET ORAL at 06:39

## 2021-04-24 RX ADMIN — CYANOCOBALAMIN TAB 500 MCG SCH MCG: 500 TAB at 08:55

## 2021-04-24 RX ADMIN — SPIRONOLACTONE SCH MG: 25 TABLET, FILM COATED ORAL at 08:55

## 2021-04-24 RX ADMIN — CEFAZOLIN SCH: 330 INJECTION, POWDER, FOR SOLUTION INTRAMUSCULAR; INTRAVENOUS at 01:56

## 2021-04-24 RX ADMIN — CEFAZOLIN SCH: 330 INJECTION, POWDER, FOR SOLUTION INTRAMUSCULAR; INTRAVENOUS at 22:30

## 2021-04-24 RX ADMIN — ACETAMINOPHEN PRN MG: 325 TABLET, FILM COATED ORAL at 19:40

## 2021-04-24 RX ADMIN — LATANOPROST SCH DROPS: 50 SOLUTION OPHTHALMIC at 19:41

## 2021-04-24 NOTE — P.PN
Subjective





HISTORY OF PRESENTING ILLNESS


Patient is a pleasant 89 yo female with history of mild dementia, chronic Afib, 

HLD, HTN, diastolic heart failure, pulmonary hypertension, tricuspid 

regurgitation.  I had recently seen patient in the office 4/16 where she had 

been complaining of increasing episodes of lightheadedness which can occur 

mainly with walking.  She has always been somewhat unsteady on her feet walking 

with a walker however now has noted feeling more lightheaded.  We therefore were

making arrangements for 1 week monitor as this was happening on a daily basis.  

She also had a recent echo in the office 3/23/2021 which showed EF 55% grade 3 

diastolic dysfunction, PFO, moderate to severe MR, moderate TR, RVSP 63.  She 

has been doing well on her anticoagulation with Xarelto.  She has not been on 

any rate contolling medications and only Aricept that may cause mild 

bradycardia.  She has been having persistent lightheadedness and therefore came 

to ER where initial EKG showed Afib with HR in the 30's at 39.  She denies any 

syncope.  She does have some chronic COBB.  She initially was given some IVF then

given Lasix 20mg IV.   





DIAGNOSTICS


Troponin normal x 3, Cr 1.09, BUN 22, TSH 3.5





4/22


Patient seen and examined.  Orthostatic vitals not performed.  Telemetry 

reviewed with occasional heart rates in the high 30s and mainly in the 40s.  She

denies any chest pain or pressure.  She still does get lightheaded standing up 

walking to the bathroom.  No syncopal episodes.  Aricept has been held.





4/24


Patient underwent successful his bundle pacing pacemaker placement yesterday.  

Pacemaker interrogated this morning with adequate sensing and pacing parameters.

 Chest x-ray performed shows improvement in aeration and no acute process from 

pacemaker.  Patient however did have episode of feeling somewhat short of breath

earlier today.  She believes this might of been from anxiety and feeling 

"jittery "and "hyper ".  Patient admits she has been feeling somewhat on edge 

since being in the hospital.  She did however desaturate with walking with 

oxygen saturations down into the 80s, normal on room air at rest.





REVIEW OF SYSTEMS


At the time of my exam:


CONSTITUTIONAL: +weakness. Denies fever or chills.


CARDIOVASCULAR: Denies chest pain, +mild chronic shortness of breath, no 

orthopnea, PND or palpitations.


RESPIRATORY: Denies cough. 


GASTROINTESTINAL: Denies abdominal pain, diarrhea, constipation, nausea or 

vomiting.


MUSCULOSKELETAL: Denies myalgias.


NEUROLOGIC: Denies numbness, tingling, headacbe or weakness.


ENDOCRINE: Denies fatigue, weight change,  polydipsia or polyurina.


GENITOURINARY: Denies burning, hematuria or urgency with micturation.


HEMATOLOGIC: Denies history of anemia or bleeding. 





PHYSICAL EXAMINATION


Vitals reviewed


CONSTITUTIONAL: No apparent distress. 


HEENT: Head is normocephalic. Pupils are equal, round. Mucous membranes of the 

mouth are dry.  No JVD. No carotid bruit.


CHEST EXAMINATION: Lungs are clear to auscultation. No chest wall tenderness is 

noted on palpation or with deep breathing. 


HEART EXAMINATION: Irregular rate and rhythm. S1, S2 heard. +2/6 systolic 

murmur, no gallops or rub.  +PPM site c/d/i


ABDOMEN: Soft, Positive bowel sounds.


EXTREMITIES: 2+ peripheral pulses, no LE edema


NEUROLOGIC EXAMINATION: No focal deficits noted 





ASSESSMENT


1.  Chronic Afib


2.  Symptomatic bradycardia, s/p His bundle PPM placement 4/23


3.  Lightheadedness


4.  Acute on chronic diastolic heart failure


5.  Moderate to severe mitral regurgitation


6.  Pyuria, questionable UTI vs more likely colonization


7.  Essential hypertension


8.  Mild dementia


9.  Acute on chronic hypoxic respiratory failure.  Likely mainly related to 

diastolic heart failure





PLAN


Patient had successful permanent pacemaker placement.  Chest x-ray performed 

which shows adequate positioning as well as interrogation shows pacemaker 

working appropriately.





Patient however does have some shortness breath and noted to be mildly hypoxic 

with ambulation.  We will check a BMP however suspect mainly related to her 

chronic diastolic heart failure with restrictive filling patterns noted on echo.

 Give dose of IV Lasix now.  Monitor response and evaluate tomorrow.  Hopeful 

discharge tomorrow morning if feeling better.





Objective





- Vital Signs


Vital signs: 


                                   Vital Signs











Temp  97.8 F   04/24/21 16:45


 


Pulse  60   04/24/21 16:45


 


Resp  16   04/24/21 16:45


 


BP  110/62   04/24/21 16:45


 


Pulse Ox  100   04/24/21 16:45








                                 Intake & Output











 04/23/21 04/24/21 04/24/21





 18:59 06:59 18:59


 


Intake Total 450 910 480


 


Output Total 500  300


 


Balance -50 910 180


 


Weight  78 kg 


 


Intake:   


 


    


 


  Intake, IV Titration  300 





  Amount   


 


    Clindamycin 900 mg In  50 





    Dextrose 5% in Water 50   





    ml @ 50 mls/hr IVPB Q6H   





    [facility] Rx#:[id number]   


 


    Sodium Chloride 0.9% 1,  250 





    000 ml @ 50 mls/hr IV .   





    Q20H [facility] Rx#:[id number]   


 


  Oral  610 480


 


Output:   


 


  Urine 500  300


 


Other:   


 


  Voiding Method  Toilet Toilet


 


  # Voids  1 2


 


  # Bowel Movements  0 














- Labs


CBC & Chem 7: 


                                 04/24/21 08:41





                                 04/21/21 03:48


Labs: 


                  Abnormal Lab Results - Last 24 Hours (Table)











  04/24/21 Range/Units





  08:41 


 


Lymphocytes #  0.4 L  (1.0-4.8)  k/uL

## 2021-04-24 NOTE — XR
EXAMINATION TYPE: XR chest 1V portable

 

DATE OF EXAM: 4/24/2021

 

COMPARISON: Yesterday

 

HISTORY: Short of breath

 

TECHNIQUE:

 

FINDINGS: Heart is enlarged. There is coarsening of interstitial markings. There is left axillary pac
emaker. Bony thorax is intact. There is no gross heart failure.

 

IMPRESSION: Mild pulmonary fibrosis. Cardiomegaly. There is clearing of the pulmonary congestion comp
ared to yesterday.

## 2021-04-24 NOTE — P.PN
Subjective


Progress Note Date: 04/24/21


Principal diagnosis: 





Bradycardia





Ms. Barber is an 88-year-old female with a past medical history of atrial 

fibrillation, GI bleed, CHF, COPD, GERD, hypertension, hyperlipidemia, RLS 

coming in with a chief complaint of some dizziness.  Patient was found to have 

bradycardia with heart rates in 30s to 50s.  Chest x-ray showed mild vascular 

congestion.





On 4/24/2021 -patient was seen and examined at the bedside.  She underwent 

pacemaker placement yesterday.  Today she is resting comfortably in bed appears 

to be no acute distress.  As per nursing staff report patient has been having 

mild difficulty in breathing with activity, dropping her saturations to high 80s

but at rest is saturations have been around low 90s.  Patient denies having any 

dizziness chest pain or palpitations.  She denies having any cough or difficulty

in breathing.  No abdominal pain nausea vomiting or diarrhea.  No dysuria or 

hematuria.  On reviewing the vitals T-max 97.8, heart rate 60, respiratory 16, 

blood pressure 110/62 saturating at 99% on 2 L of nasal cannula.  On reviewing 

the labs from this morning white count of 5.2, hemoglobin 12.6, platelets 185.





Active Medications





Acetaminophen (Acetaminophen Tab 325 Mg Tab)  650 mg PO Q6HR PRN


   PRN Reason: Mild Pain


   Last Admin: 04/24/21 19:40 Dose:  650 mg


   Documented by: 


Cyanocobalamin (Cyanocobalamin 500 Mcg Tab)  1,000 mcg PO DAILY [facility]


   Last Admin: 04/24/21 08:55 Dose:  1,000 mcg


   Documented by: 


Donepezil HCl (Donepezil 10 Mg Tab)  10 mg PO DAILY [facility]


   Last Admin: 04/24/21 08:55 Dose:  10 mg


   Documented by: 


Furosemide (Furosemide 40 Mg Tab)  40 mg PO DAILY [facility]


   Last Admin: 04/24/21 08:55 Dose:  40 mg


   Documented by: 


Sodium Chloride (Saline 0.9%)  1,000 mls @ 50 mls/hr IV .Q20H [facility]


   Last Admin: 04/24/21 22:30 Dose:  Not Given


   Documented by: 


Sodium Chloride (Saline 0.9%)  1,000 mls @ 50 mls/hr IV .Q20H [facility]


   Last Admin: 04/24/21 22:30 Dose:  Not Given


   Documented by: 


Latanoprost (Latanoprost 0.005% Ophth Drops 2.5 Ml Btl)  1 drops LEFT EYE [facility]


   Last Admin: 04/24/21 19:41 Dose:  1 drops


   Documented by: 


Levothyroxine Sodium (Levothyroxine 25 Mcg Tab)  25 mcg PO DAILY@0630 [facility]


   Last Admin: 04/24/21 06:39 Dose:  25 mcg


   Documented by: 


Lisinopril (Lisinopril 10 Mg Tab)  10 mg PO [facility]


   Last Admin: 04/24/21 19:41 Dose:  10 mg


   Documented by: 


Lorazepam (Lorazepam 0.5 Mg Tab)  0.5 mg PO HS PRN


   PRN Reason: Anxiety


   Last Admin: 04/24/21 22:40 Dose:  0.5 mg


   Documented by: 


Naloxone HCl (Naloxone 0.4 Mg/Ml 1 Ml Vial)  0.2 mg IV Q2M PRN


   PRN Reason: Opioid Reversal


Pantoprazole Sodium (Pantoprazole 40 Mg Tablet)  40 mg PO AC-BRKFST [facility]


   Last Admin: 04/24/21 06:39 Dose:  40 mg


   Documented by: 


Pravastatin Sodium (Pravastatin Sodium 40 Mg Tab)  40 mg PO [facility]


   Last Admin: 04/24/21 19:41 Dose:  40 mg


   Documented by: 


Senna/Docusate Sodium (Sennosides-Docusate Sodium 1 Each Tab)  2 each PO BID [facility]


   Last Admin: 04/24/21 19:49 Dose:  Not Given


   Documented by: 


Sodium Chloride (Sodium Chloride 0.9% Flush 10 Ml Syringe)  10 ml IV Q12HR [facility]


   Last Admin: 04/24/21 19:42 Dose:  10 ml


   Documented by: 


Spironolactone (Spironolactone 25 Mg Tab)  25 mg PO DAILY [facility]


   Last Admin: 04/24/21 08:55 Dose:  25 mg


   Documented by: 


Tiotropium Bromide (Tiotropium 2.5 Mcg Inhaler)  2 puff INHALATION RT-DAILY [facility]


   Last Admin: 04/24/21 10:27 Dose:  2 puff


   Documented by: 











Objective





- Vital Signs


Vital signs: 


                                   Vital Signs











Temp  97.8 F   04/24/21 08:48


 


Pulse  65   04/24/21 11:30


 


Resp  18   04/24/21 11:30


 


BP  142/83   04/24/21 11:30


 


Pulse Ox  97   04/24/21 11:30








                                 Intake & Output











 04/23/21 04/24/21 04/24/21





 18:59 06:59 18:59


 


Intake Total 450 910 240


 


Output Total 500  


 


Balance -50 910 240


 


Weight  78 kg 


 


Intake:   


 


    


 


  Intake, IV Titration  300 





  Amount   


 


    Clindamycin 900 mg In  50 





    Dextrose 5% in Water 50   





    ml @ 50 mls/hr IVPB Q6H   





    [facility] Rx#:[id number]   


 


    Sodium Chloride 0.9% 1,  250 





    000 ml @ 50 mls/hr IV .   





    Q20H [facility] Rx#:[id number]   


 


  Oral  610 240


 


Output:   


 


  Urine 500  


 


Other:   


 


  Voiding Method  Toilet Toilet


 


  # Voids  1 1


 


  # Bowel Movements  0 














- Exam





GENERAL: The patient is alert and oriented x3, not in any acute distress. Well 

developed, well nourished. 


HEENT: Pupils are round and equally reacting to light. EOMI. No scleral icterus.

No conjunctival pallor.


CARDIOVASCULAR: S1 and S2 present. No murmurs, rubs, or gallops. 


PULMONARY: Chest is clear to auscultation, no wheezing or crackles. 


ABDOMEN: Soft, nontender, nondistended, normoactive bowel sounds. No palpable 

organomegaly. 


MUSCULOSKELETAL: No joint swelling or deformity. 


EXTREMITIES: No cyanosis, clubbing, or pedal edema. 


NEUROLOGICAL: Gross neurological examination did not reveal any focal deficits. 


SKIN: No rashes. no petechiae.





- Labs


CBC & Chem 7: 


                                 04/24/21 08:41





                                 04/21/21 03:48


Labs: 


                  Abnormal Lab Results - Last 24 Hours (Table)











  04/24/21 Range/Units





  08:41 


 


Lymphocytes #  0.4 L  (1.0-4.8)  k/uL








                      Microbiology - Last 24 Hours (Table)











 04/21/21 23:45 Urine Culture - Final





 Urine,Voided 














Assessment and Plan


Assessment: 





ASSESSMENT 





Symptomatic bradycardia , status post pacemaker placement on 4/23.  Today postop

day #1


Chronic A. fib currently with bradycardia 


mild acute on chronic diastolic CHF with ejection fraction 50-55% , resolved


Acute urinary tract infection, resolved with treatment.  No need for antibiotic.

 


Chronic increased frequency of urination for 6-12 months.  Check bladder scan


Moderate mitral regurgitation


Hypertension


Hyperlipidemia


Chronic kidney disease, stage III


History of GI bleed


History of GERD


Restless leg syndrome





PLAN: Patient is status post pacemaker placement yesterday.  Continue with 

telemetry monitoring.  Patient has mild difficulty in breathing, will get a 

chest x-ray.  Her ceftriaxone has been discontinued as she does not have UTI.  

Labs and medications reviewed.  Continue with the current medication regimen.  

We will continue to monitor electrolytes and vitals.  GI DVT prophylaxis.  

Further recommendations to follow depending on the progress of the patient.

## 2021-04-25 VITALS — TEMPERATURE: 97.9 F

## 2021-04-25 LAB
ALBUMIN SERPL-MCNC: 4 G/DL (ref 3.5–5)
ANION GAP SERPL CALC-SCNC: 10 MMOL/L
BASOPHILS # BLD AUTO: 0 K/UL (ref 0–0.2)
BASOPHILS NFR BLD AUTO: 1 %
BUN SERPL-SCNC: 31 MG/DL (ref 7–17)
CALCIUM SPEC-MCNC: 9.5 MG/DL (ref 8.4–10.2)
CHLORIDE SERPL-SCNC: 97 MMOL/L (ref 98–107)
CO2 SERPL-SCNC: 29 MMOL/L (ref 22–30)
EOSINOPHIL # BLD AUTO: 0.1 K/UL (ref 0–0.7)
EOSINOPHIL NFR BLD AUTO: 2 %
ERYTHROCYTE [DISTWIDTH] IN BLOOD BY AUTOMATED COUNT: 4.44 M/UL (ref 3.8–5.4)
ERYTHROCYTE [DISTWIDTH] IN BLOOD: 12.3 % (ref 11.5–15.5)
GLUCOSE SERPL-MCNC: 90 MG/DL (ref 74–99)
HCT VFR BLD AUTO: 39.8 % (ref 34–46)
HGB BLD-MCNC: 13.3 GM/DL (ref 11.4–16)
LYMPHOCYTES # SPEC AUTO: 0.6 K/UL (ref 1–4.8)
LYMPHOCYTES NFR SPEC AUTO: 10 %
MAGNESIUM SPEC-SCNC: 1.9 MG/DL (ref 1.6–2.3)
MCH RBC QN AUTO: 29.8 PG (ref 25–35)
MCHC RBC AUTO-ENTMCNC: 33.3 G/DL (ref 31–37)
MCV RBC AUTO: 89.6 FL (ref 80–100)
MONOCYTES # BLD AUTO: 0.5 K/UL (ref 0–1)
MONOCYTES NFR BLD AUTO: 8 %
NEUTROPHILS # BLD AUTO: 4.7 K/UL (ref 1.3–7.7)
NEUTROPHILS NFR BLD AUTO: 78 %
PLATELET # BLD AUTO: 170 K/UL (ref 150–450)
POTASSIUM SERPL-SCNC: 4.7 MMOL/L (ref 3.5–5.1)
SODIUM SERPL-SCNC: 136 MMOL/L (ref 137–145)
WBC # BLD AUTO: 6.1 K/UL (ref 3.8–10.6)

## 2021-04-25 RX ADMIN — LATANOPROST SCH DROPS: 50 SOLUTION OPHTHALMIC at 19:54

## 2021-04-25 RX ADMIN — SODIUM CHLORIDE, PRESERVATIVE FREE SCH ML: 5 INJECTION INTRAVENOUS at 19:54

## 2021-04-25 RX ADMIN — CYANOCOBALAMIN TAB 500 MCG SCH MCG: 500 TAB at 08:21

## 2021-04-25 RX ADMIN — SODIUM CHLORIDE, PRESERVATIVE FREE SCH ML: 5 INJECTION INTRAVENOUS at 08:21

## 2021-04-25 RX ADMIN — PANTOPRAZOLE SODIUM SCH MG: 40 TABLET, DELAYED RELEASE ORAL at 06:32

## 2021-04-25 RX ADMIN — TIOTROPIUM BROMIDE INHALATION SPRAY SCH: 3.12 SPRAY, METERED RESPIRATORY (INHALATION) at 19:55

## 2021-04-25 RX ADMIN — ACETAMINOPHEN PRN MG: 325 TABLET, FILM COATED ORAL at 21:14

## 2021-04-25 RX ADMIN — SPIRONOLACTONE SCH MG: 25 TABLET, FILM COATED ORAL at 08:21

## 2021-04-25 RX ADMIN — DONEPEZIL HYDROCHLORIDE SCH MG: 10 TABLET ORAL at 08:21

## 2021-04-25 RX ADMIN — DOCUSATE SODIUM AND SENNOSIDES SCH EACH: 50; 8.6 TABLET ORAL at 19:53

## 2021-04-25 RX ADMIN — DOCUSATE SODIUM AND SENNOSIDES SCH: 50; 8.6 TABLET ORAL at 08:21

## 2021-04-25 RX ADMIN — PRAVASTATIN SODIUM SCH MG: 40 TABLET ORAL at 19:53

## 2021-04-25 RX ADMIN — LEVOTHYROXINE SODIUM SCH MCG: 25 TABLET ORAL at 06:32

## 2021-04-25 RX ADMIN — FUROSEMIDE SCH MG: 40 TABLET ORAL at 08:21

## 2021-04-25 RX ADMIN — CEFAZOLIN SCH: 330 INJECTION, POWDER, FOR SOLUTION INTRAMUSCULAR; INTRAVENOUS at 19:55

## 2021-04-25 NOTE — P.PN
Subjective





HISTORY OF PRESENTING ILLNESS


Patient is a pleasant 89 yo female with history of mild dementia, chronic Afib, 

HLD, HTN, diastolic heart failure, pulmonary hypertension, tricuspid 

regurgitation.  I had recently seen patient in the office 4/16 where she had 

been complaining of increasing episodes of lightheadedness which can occur 

mainly with walking.  She has always been somewhat unsteady on her feet walking 

with a walker however now has noted feeling more lightheaded.  We therefore were

making arrangements for 1 week monitor as this was happening on a daily basis.  

She also had a recent echo in the office 3/23/2021 which showed EF 55% grade 3 

diastolic dysfunction, PFO, moderate to severe MR, moderate TR, RVSP 63.  She 

has been doing well on her anticoagulation with Xarelto.  She has not been on 

any rate contolling medications and only Aricept that may cause mild 

bradycardia.  She has been having persistent lightheadedness and therefore came 

to ER where initial EKG showed Afib with HR in the 30's at 39.  She denies any 

syncope.  She does have some chronic COBB.  She initially was given some IVF then

given Lasix 20mg IV.   





DIAGNOSTICS


Troponin normal x 3, Cr 1.09, BUN 22, TSH 3.5





4/22


Patient seen and examined.  Orthostatic vitals not performed.  Telemetry 

reviewed with occasional heart rates in the high 30s and mainly in the 40s.  She

denies any chest pain or pressure.  She still does get lightheaded standing up 

walking to the bathroom.  No syncopal episodes.  Aricept has been held.





4/24


Patient underwent successful his bundle pacing pacemaker placement yesterday.  

Pacemaker interrogated this morning with adequate sensing and pacing parameters.

 Chest x-ray performed shows improvement in aeration and no acute process from 

pacemaker.  Patient however did have episode of feeling somewhat short of breath

earlier today.  She believes this might of been from anxiety and feeling 

"jittery "and "hyper ".  Patient admits she has been feeling somewhat on edge 

since being in the hospital.  She did however desaturate with walking with 

oxygen saturations down into the 80s, normal on room air at rest.





4/25


Patient seen and examined.  Patient still with some hypoxia with exertion and 

therefore discharge has been held.  She states she feels as good as she does at 

home if not better.  No issues with her pacemaker and has been feeling well.





REVIEW OF SYSTEMS


At the time of my exam:


CONSTITUTIONAL: +weakness. Denies fever or chills.


CARDIOVASCULAR: Denies chest pain, +mild chronic shortness of breath, no 

orthopnea, PND or palpitations.


RESPIRATORY: Denies cough. 


GASTROINTESTINAL: Denies abdominal pain, diarrhea, constipation, nausea or 

vomiting.


MUSCULOSKELETAL: Denies myalgias.


NEUROLOGIC: Denies numbness, tingling, headacbe or weakness.


ENDOCRINE: Denies fatigue, weight change,  polydipsia or polyurina.


GENITOURINARY: Denies burning, hematuria or urgency with micturation.


HEMATOLOGIC: Denies history of anemia or bleeding. 





PHYSICAL EXAMINATION


Vitals reviewed


CONSTITUTIONAL: No apparent distress. 


HEENT: Head is normocephalic. Pupils are equal, round. Mucous membranes of the 

mouth are dry.  No JVD. No carotid bruit.


CHEST EXAMINATION: Lungs are clear to auscultation. No chest wall tenderness is 

noted on palpation or with deep breathing. 


HEART EXAMINATION: Irregular rate and rhythm. S1, S2 heard. +2/6 systolic 

murmur, no gallops or rub.  +PPM site c/d/i


ABDOMEN: Soft, Positive bowel sounds.


EXTREMITIES: 2+ peripheral pulses, no LE edema


NEUROLOGIC EXAMINATION: No focal deficits noted 





ASSESSMENT


1.  Chronic Afib


2.  Symptomatic bradycardia, s/p His bundle PPM placement 4/23


3.  Lightheadedness


4.  Acute on chronic diastolic heart failure


5.  Moderate to severe mitral regurgitation


6.  Pyuria, questionable UTI vs more likely colonization


7.  Essential hypertension


8.  Mild dementia


9.  Acute on chronic hypoxic respiratory failure.  Possibly related to diastolic

heart failure





PLAN


Patient had successful permanent pacemaker placement.  Chest x-ray performed 

which shows adequate positioning as well as interrogation shows pacemaker 

working appropriately.





ProBNP noted to be only 1100.  May be some component of hypoxia from her 

diastolic heart failure when she exerts herself however appears at her baseline.

 It is reasonable to attempt oxygen at home and monitor response however suspect

she is mainly at her baseline.  Hopeful discharge home tomorrow.





Objective





- Vital Signs


Vital signs: 


                                   Vital Signs











Temp  97.7 F   04/25/21 16:30


 


Pulse  60   04/25/21 16:30


 


Resp  16   04/25/21 16:30


 


BP  154/72   04/25/21 16:30


 


Pulse Ox  97   04/25/21 16:30








                                 Intake & Output











 04/25/21 04/25/21 04/26/21





 06:59 18:59 06:59


 


Intake Total 250 1220 


 


Output Total 1400 400 


 


Balance -1150 820 


 


Weight 77.4 kg  


 


Intake:   


 


  Oral 250 1220 


 


Output:   


 


  Urine 1400 400 


 


Other:   


 


  Voiding Method Toilet Toilet 


 


  # Voids 1 2 














- Labs


CBC & Chem 7: 


                                 04/25/21 08:51





                                 04/25/21 08:51


Labs: 


                  Abnormal Lab Results - Last 24 Hours (Table)











  04/25/21 04/25/21 Range/Units





  08:51 08:51 


 


Lymphocytes #  0.6 L   (1.0-4.8)  k/uL


 


Sodium   136 L  (137-145)  mmol/L


 


Chloride   97 L  ()  mmol/L


 


BUN   31 H  (7-17)  mg/dL


 


Creatinine   1.25 H  (0.52-1.04)  mg/dL

## 2021-04-26 VITALS — RESPIRATION RATE: 18 BRPM

## 2021-04-26 VITALS — HEART RATE: 64 BPM | SYSTOLIC BLOOD PRESSURE: 138 MMHG | DIASTOLIC BLOOD PRESSURE: 63 MMHG

## 2021-04-26 LAB
ANION GAP SERPL CALC-SCNC: 6 MMOL/L
BASOPHILS # BLD AUTO: 0 K/UL (ref 0–0.2)
BASOPHILS NFR BLD AUTO: 1 %
BUN SERPL-SCNC: 34 MG/DL (ref 7–17)
CALCIUM SPEC-MCNC: 9.5 MG/DL (ref 8.4–10.2)
CHLORIDE SERPL-SCNC: 97 MMOL/L (ref 98–107)
CO2 SERPL-SCNC: 31 MMOL/L (ref 22–30)
EOSINOPHIL # BLD AUTO: 0 K/UL (ref 0–0.7)
EOSINOPHIL NFR BLD AUTO: 1 %
ERYTHROCYTE [DISTWIDTH] IN BLOOD BY AUTOMATED COUNT: 4.4 M/UL (ref 3.8–5.4)
ERYTHROCYTE [DISTWIDTH] IN BLOOD: 12.2 % (ref 11.5–15.5)
GLUCOSE SERPL-MCNC: 107 MG/DL (ref 74–99)
HCT VFR BLD AUTO: 39.5 % (ref 34–46)
HGB BLD-MCNC: 13.5 GM/DL (ref 11.4–16)
LYMPHOCYTES # SPEC AUTO: 0.6 K/UL (ref 1–4.8)
LYMPHOCYTES NFR SPEC AUTO: 11 %
MCH RBC QN AUTO: 30.8 PG (ref 25–35)
MCHC RBC AUTO-ENTMCNC: 34.2 G/DL (ref 31–37)
MCV RBC AUTO: 89.8 FL (ref 80–100)
MONOCYTES # BLD AUTO: 0.4 K/UL (ref 0–1)
MONOCYTES NFR BLD AUTO: 6 %
NEUTROPHILS # BLD AUTO: 4.6 K/UL (ref 1.3–7.7)
NEUTROPHILS NFR BLD AUTO: 80 %
PLATELET # BLD AUTO: 169 K/UL (ref 150–450)
POTASSIUM SERPL-SCNC: 4.3 MMOL/L (ref 3.5–5.1)
SODIUM SERPL-SCNC: 134 MMOL/L (ref 137–145)
WBC # BLD AUTO: 5.8 K/UL (ref 3.8–10.6)

## 2021-04-26 RX ADMIN — PANTOPRAZOLE SODIUM SCH MG: 40 TABLET, DELAYED RELEASE ORAL at 06:43

## 2021-04-26 RX ADMIN — DONEPEZIL HYDROCHLORIDE SCH MG: 10 TABLET ORAL at 08:48

## 2021-04-26 RX ADMIN — DOCUSATE SODIUM AND SENNOSIDES SCH: 50; 8.6 TABLET ORAL at 08:48

## 2021-04-26 RX ADMIN — SPIRONOLACTONE SCH MG: 25 TABLET, FILM COATED ORAL at 08:48

## 2021-04-26 RX ADMIN — ACETAMINOPHEN PRN MG: 325 TABLET, FILM COATED ORAL at 11:10

## 2021-04-26 RX ADMIN — FUROSEMIDE SCH MG: 40 TABLET ORAL at 08:48

## 2021-04-26 RX ADMIN — CYANOCOBALAMIN TAB 500 MCG SCH MCG: 500 TAB at 08:48

## 2021-04-26 RX ADMIN — LEVOTHYROXINE SODIUM SCH MCG: 25 TABLET ORAL at 06:43

## 2021-04-26 NOTE — P.PN
Subjective


Progress Note Date: 04/25/21


Principal diagnosis: 





Bradycardia





Ms. Barber is an 88-year-old female with a past medical history of atrial 

fibrillation, GI bleed, CHF, COPD, GERD, hypertension, hyperlipidemia, RLS 

coming in with a chief complaint of some dizziness.  Patient was found to have 

bradycardia with heart rates in 30s to 50s.  Chest x-ray showed mild vascular 

congestion.





On 4/24/2021 -patient was seen and examined at the bedside.  She underwent 

pacemaker placement yesterday.  Today she is resting comfortably in bed appears 

to be no acute distress.  As per nursing staff report patient has been having 

mild difficulty in breathing with activity, dropping her saturations to high 80s

but at rest is saturations have been around low 90s.  Patient denies having any 

dizziness chest pain or palpitations.  She denies having any cough or difficulty

in breathing.  No abdominal pain nausea vomiting or diarrhea.  No dysuria or 

hematuria.  On reviewing the vitals T-max 97.8, heart rate 60, respiratory 16, 

blood pressure 110/62 saturating at 99% on 2 L of nasal cannula.  On reviewing 

the labs from this morning white count of 5.2, hemoglobin 12.6, platelets 185.





On 4/25/2021 -patient was seen and examined at bedside.  She still continues to 

have some difficulty in breathing.  Patient has been maintaining her saturations

above 90 at rest but with mild activity she is dropping her saturations to 80s. 

Other than that she denies having any chest pain or palpitations.  No fever 

chills or rigors.  No abdominal pain nausea vomiting or diarrhea.  Patient is 

eager to go home.  Patient son at bedside.  Reviewing the vitals temperature 

97.9, heart rate 60, respiratory rate 17 and saturating at 92% on room air.  On 

reviewing the labs from this morning sodium is 136, potassium 4.7, chloride 97, 

bicarb 29, BUN 31, creatinine 1.25.  White count of 6.1, hemoglobin 13.3, 

platelets 170.  Patient had a chest x-ray done yesterday evening showing mild 

pulmonary fibrosis cardiomegaly and clearing up of pulmonary congestion.





Patient's medications have been reviewed





Objective





- Vital Signs


Vital signs: 


                                   Vital Signs











Temp  98 F   04/25/21 08:00


 


Pulse  66   04/25/21 11:35


 


Resp  16   04/25/21 14:30


 


BP  124/71   04/25/21 11:35


 


Pulse Ox  94 L  04/25/21 11:35








                                 Intake & Output











 04/24/21 04/25/21 04/25/21





 18:59 06:59 18:59


 


Intake Total 720 250 480


 


Output Total 300 1400 


 


Balance 420 -1150 480


 


Weight  77.4 kg 


 


Intake:   


 


  Oral 720 250 480


 


Output:   


 


  Urine 300 1400 


 


Other:   


 


  Voiding Method Toilet Toilet Toilet


 


  # Voids 2 1 2














- Exam





GENERAL: The patient is alert and oriented x3, not in any acute distress. [place] 


HEENT: Pupils are round and equally reacting to light. EOMI. No scleral icterus.

No conjunctival pallor.


CARDIOVASCULAR: S1 and S2 present. No murmurs, rubs, or gallops. 


PULMONARY: Chest is clear to auscultation, no wheezing or crackles. 


ABDOMEN: Soft, nontender, nondistended, normoactive bowel sounds. No palpable 

organomegaly. 


MUSCULOSKELETAL: No joint swelling or deformity. 


EXTREMITIES: No cyanosis, clubbing, or pedal edema. 


NEUROLOGICAL: Gross neurological examination did not reveal any focal deficits. 


SKIN: No rashes. no petechiae.





- Labs


CBC & Chem 7: 


                                 04/25/21 08:51





                                 04/25/21 08:51


Labs: 


                  Abnormal Lab Results - Last 24 Hours (Table)











  04/25/21 04/25/21 Range/Units





  08:51 08:51 


 


Lymphocytes #  0.6 L   (1.0-4.8)  k/uL


 


Sodium   136 L  (137-145)  mmol/L


 


Chloride   97 L  ()  mmol/L


 


BUN   31 H  (7-17)  mg/dL


 


Creatinine   1.25 H  (0.52-1.04)  mg/dL














Assessment and Plan


Assessment: 





ASSESSMENT 





Symptomatic bradycardia , status post pacemaker placement on 4/23.  Today postop

day #1


Chronic A. fib currently with bradycardia 


mild acute on chronic diastolic CHF with ejection fraction 50-55% , resolved


Acute urinary tract infection, resolved with treatment.  No need for antibiotic.

 


Chronic increased frequency of urination for 6-12 months.  Check bladder scan


Moderate mitral regurgitation


Hypertension


Hyperlipidemia


Chronic kidney disease, stage III


History of GI bleed


History of GERD


Restless leg syndrome





PLAN: Patient is status post pacemaker placement yesterday.  Continue with 

telemetry monitoring. Her ceftriaxone has been discontinued as she does not have

UTI.  Labs and medications reviewed. Patient's chest x-ray from last evening 

showed clearing up of pulmonary vascular congestion.  Patient will need home 

oxygen evaluation prior to discharge.  As per discussion with the son she also 

needs a wheelchair, /   to be taking care of this.  

Continue with the current medication regimen.  We will continue to monitor 

electrolytes and vitals.  GI DVT prophylaxis. Anticipate discharge in the next 

24 hrs.

## 2021-04-27 NOTE — P.DS
Providers


Date of admission: 


04/21/21 05:31





Expected date of discharge: 04/26/21


Attending physician: 


Kurt Patel





Consults: 





                                        





04/21/21 05:31


Consult Physician Routine 


   Consulting Provider: Casper Mark


   Consult Reason/Comments: Symptomatic bradycardia


   Do you want consulting provider notified?: Yes











Primary care physician: 


Kurt Patel





[facility] Course: 


Final diagnoses


Symptomatic bradycardia , status post pacemaker placement on 4/23.  


Acute on chronic hypoxic respiratory failure, improved, greater than 90% on room

air after ambulation.


Chronic A. fib currently with bradycardia 


mild acute on chronic diastolic CHF with ejection fraction 50-55% , resolved


Moderate to severe mitral regurgitation


Hypertension


Hyperlipidemia


Chronic kidney disease, stage III


History of GI bleed


History of GERD


Restless leg syndrome

















Hospital course ; this is an 80-year-old female with chronic atrial fibrillation

admitted with symptomatic bradycardia, evaluated by cardiology and underwent 

permanent pacemaker implantation. Tolerated procedure well.Interrogation 

reported as working appropriately.  Patient will be evaluated for home oxygen 

prior to discharge.  Orthostatic hypotension  resolved, blood pressure sitting 

102/63, standing 99/54.  Denies lightheadedness dizziness or focal deficits.  

Urine culture reported no growth. Significant clinical improvement.Cleared by 

cardiology for discharge.  Patient will be discharged home to [facility] in a stable condition with guarded prognosis.  Patient and family have 

declined home care, reporting niece will be staying with patient.

















- Exam





GENERAL: The patient is alert and oriented x3, not in any acute distress. [place] 


CARDIOVASCULAR: S1 and S2 present. No murmurs, rubs, or gallops.  No edema.


PULMONARY: Chest is clear to auscultation.


ABDOMEN: Soft, nontender, nondistended, normoactive bowel sounds. No palpable 

organomegaly.


NEUROLOGICAL: Gross neurological examination did not reveal any focal deficits. 











The impression and plan of care has been dictated as directed.





:


I performed a history and examination of this patient,  discussed the same with 

the dictator.  I agree with the dictator's note ,documented as a scribe.  Any 

additional findings or plans will be noted.





Patient Condition at Discharge: Stable





Plan - Discharge Summary


Discharge Rx Participant: No


New Discharge Prescriptions: 


New


   Sennosides-Docusate Sodium [Senokot-S] 2 each PO BID  tab


   lisinopriL [Zestril] 5 mg PO HS #30 tab


   Acetaminophen Tab [Tylenol] 650 mg PO Q6HR PRN  tab


     PRN Reason: Mild Pain





Continue


   LORazepam [Ativan] 0.5 mg PO HS PRN


     PRN Reason: Anxiety/restless legs


   rOPINIRole HCL [Requip] 1 mg PO HS


   Rivaroxaban [Xarelto] 15 mg PO HS


   Pravastatin Sodium [Pravachol] 40 mg PO HS


   Omeprazole [PriLOSEC] 20 mg PO AC-BRKFST


   Tiotropium 18 Mcg/Puff [Spiriva] 2 puff INHALATION RT-DAILY


   Latanoprost Ophth [Xalatan 0.005%] 1 drops BOTH EYES HS


   Spironolactone [Aldactone] 25 mg PO DAILY


   Levothyroxine Sodium [Synthroid] 25 mcg PO DAILY


   Donepezil HCl [Aricept] 10 mg PO DAILY


   Cyanocobalamin (Vitamin B-12) [Vitamin B-12] 1,000 mcg PO DAILY


   Vit C/E/Zn/Coppr/Lutein/Zeaxan [Preservision Areds 2 Softgel] 2 cap PO DAILY


   Furosemide [Lasix] 20 mg PO DAILY





Discontinued


   lisinopriL [Zestril] 10 mg PO HS


Discharge Medication List





LORazepam [Ativan] 0.5 mg PO HS PRN 08/02/15 [History]


Omeprazole [PriLOSEC] 20 mg PO AC-BRKFST 08/02/15 [History]


Pravastatin Sodium [Pravachol] 40 mg PO HS 08/02/15 [History]


Rivaroxaban [Xarelto] 15 mg PO HS 08/02/15 [History]


Tiotropium 18 Mcg/Puff [Spiriva] 2 puff INHALATION RT-DAILY 08/02/15 [History]


rOPINIRole HCL [Requip] 1 mg PO HS 08/02/15 [History]


Cyanocobalamin (Vitamin B-12) [Vitamin B-12] 1,000 mcg PO DAILY 09/11/19 

[History]


Donepezil HCl [Aricept] 10 mg PO DAILY 09/11/19 [History]


Latanoprost Ophth [Xalatan 0.005%] 1 drops BOTH EYES HS 09/11/19 [History]


Levothyroxine Sodium [Synthroid] 25 mcg PO DAILY 09/11/19 [History]


Spironolactone [Aldactone] 25 mg PO DAILY 09/11/19 [History]


Vit C/E/Zn/Coppr/Lutein/Zeaxan [Preservision Areds 2 Softgel] 2 cap PO DAILY 

09/11/19 [History]


Furosemide [Lasix] 20 mg PO DAILY 04/21/21 [History]


Acetaminophen Tab [Tylenol] 650 mg PO Q6HR PRN  tab 04/26/21 [Rx]


Sennosides-Docusate Sodium [Senokot-S] 2 each PO BID  tab 04/26/21 [Rx]


lisinopriL [Zestril] 5 mg PO HS #30 tab 04/26/21 [Rx]








Follow up Appointment(s)/Referral(s): 


Javier Atkinson MD [STAFF PHYSICIAN] - 05/14/21


(May 14th with Dr. Flaherty.  


OFFICE WILL ALL YOU TO SET UP APPOINTMENT FOR PACEMAKER.  )


Kurt Patel DO [Primary Care Provider] - 04/30/21 2:50 pm


Ambulatory/Diagnostic Orders: 


Basic Metabolic Panel [LAB.AMB] Time Frame: 3 Days, Location: None Selected


Patient Instructions/Handouts:  Syncope (DC), Pacemaker (DC)


Activity/Diet/Wound Care/Special Instructions: 


KEVIN hose, send home with pt.


Discharge Disposition: HOME SELF-CARE

## 2021-08-26 ENCOUNTER — HOSPITAL ENCOUNTER (EMERGENCY)
Dept: HOSPITAL 47 - EC | Age: 86
Discharge: HOME | End: 2021-08-26
Payer: MEDICARE

## 2021-08-26 VITALS
DIASTOLIC BLOOD PRESSURE: 91 MMHG | RESPIRATION RATE: 18 BRPM | TEMPERATURE: 97.8 F | HEART RATE: 71 BPM | SYSTOLIC BLOOD PRESSURE: 158 MMHG

## 2021-08-26 DIAGNOSIS — F41.9: ICD-10-CM

## 2021-08-26 DIAGNOSIS — S09.90XA: ICD-10-CM

## 2021-08-26 DIAGNOSIS — E78.5: ICD-10-CM

## 2021-08-26 DIAGNOSIS — S91.312A: Primary | ICD-10-CM

## 2021-08-26 DIAGNOSIS — Z86.79: ICD-10-CM

## 2021-08-26 DIAGNOSIS — Z87.891: ICD-10-CM

## 2021-08-26 DIAGNOSIS — J44.9: ICD-10-CM

## 2021-08-26 DIAGNOSIS — I48.91: ICD-10-CM
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
## 2021-08-26 DIAGNOSIS — I10: ICD-10-CM

## 2021-08-26 LAB
ALBUMIN SERPL-MCNC: 3.9 G/DL (ref 3.5–5)
ALP SERPL-CCNC: 113 U/L (ref 38–126)
ALT SERPL-CCNC: 9 U/L (ref 4–34)
ANION GAP SERPL CALC-SCNC: 7 MMOL/L
APTT BLD: 24 SEC (ref 22–30)
AST SERPL-CCNC: 23 U/L (ref 14–36)
BASOPHILS # BLD AUTO: 0 K/UL (ref 0–0.2)
BASOPHILS NFR BLD AUTO: 1 %
BUN SERPL-SCNC: 11 MG/DL (ref 7–17)
CALCIUM SPEC-MCNC: 9.5 MG/DL (ref 8.4–10.2)
CHLORIDE SERPL-SCNC: 102 MMOL/L (ref 98–107)
CO2 SERPL-SCNC: 28 MMOL/L (ref 22–30)
EOSINOPHIL # BLD AUTO: 0.1 K/UL (ref 0–0.7)
EOSINOPHIL NFR BLD AUTO: 1 %
ERYTHROCYTE [DISTWIDTH] IN BLOOD BY AUTOMATED COUNT: 4.17 M/UL (ref 3.8–5.4)
ERYTHROCYTE [DISTWIDTH] IN BLOOD: 12.6 % (ref 11.5–15.5)
GLUCOSE SERPL-MCNC: 103 MG/DL (ref 74–99)
HCT VFR BLD AUTO: 38.3 % (ref 34–46)
HGB BLD-MCNC: 12.8 GM/DL (ref 11.4–16)
INR PPP: 1.1 (ref ?–1.2)
LYMPHOCYTES # SPEC AUTO: 0.6 K/UL (ref 1–4.8)
LYMPHOCYTES NFR SPEC AUTO: 11 %
MCH RBC QN AUTO: 30.8 PG (ref 25–35)
MCHC RBC AUTO-ENTMCNC: 33.5 G/DL (ref 31–37)
MCV RBC AUTO: 91.9 FL (ref 80–100)
MONOCYTES # BLD AUTO: 0.3 K/UL (ref 0–1)
MONOCYTES NFR BLD AUTO: 6 %
NEUTROPHILS # BLD AUTO: 4.1 K/UL (ref 1.3–7.7)
NEUTROPHILS NFR BLD AUTO: 79 %
PLATELET # BLD AUTO: 189 K/UL (ref 150–450)
POTASSIUM SERPL-SCNC: 4.2 MMOL/L (ref 3.5–5.1)
PROT SERPL-MCNC: 6.4 G/DL (ref 6.3–8.2)
PT BLD: 11.6 SEC (ref 9–12)
SODIUM SERPL-SCNC: 137 MMOL/L (ref 137–145)
WBC # BLD AUTO: 5.2 K/UL (ref 3.8–10.6)

## 2021-08-26 PROCEDURE — 70450 CT HEAD/BRAIN W/O DYE: CPT

## 2021-08-26 PROCEDURE — 80053 COMPREHEN METABOLIC PANEL: CPT

## 2021-08-26 PROCEDURE — 99284 EMERGENCY DEPT VISIT MOD MDM: CPT

## 2021-08-26 PROCEDURE — 72125 CT NECK SPINE W/O DYE: CPT

## 2021-08-26 PROCEDURE — 12002 RPR S/N/AX/GEN/TRNK2.6-7.5CM: CPT

## 2021-08-26 PROCEDURE — 85610 PROTHROMBIN TIME: CPT

## 2021-08-26 PROCEDURE — 85025 COMPLETE CBC W/AUTO DIFF WBC: CPT

## 2021-08-26 PROCEDURE — 84484 ASSAY OF TROPONIN QUANT: CPT

## 2021-08-26 PROCEDURE — 93005 ELECTROCARDIOGRAM TRACING: CPT

## 2021-08-26 PROCEDURE — 85730 THROMBOPLASTIN TIME PARTIAL: CPT

## 2021-08-26 PROCEDURE — 36415 COLL VENOUS BLD VENIPUNCTURE: CPT

## 2021-08-26 NOTE — ED
Lower Extremity Injury HPI





- General


Chief Complaint: Extremity Injury, Lower


Stated Complaint: Fall/ leg lac


Time Seen by Provider: 21 12:27


Source: patient


Mode of arrival: EMS


Limitations: no limitations





- History of Present Illness


Initial Comments: 





89-year-old female presenting to the emergency department with chief complaint 

of a fall.  States this occurred about one hour prior to arrival.  Patient 

reports she was attempting to put on his shoes, lost her balance and fell 

backwards.  States she caught her left foot on an object and caused a laceration

to the anterior aspect of the left lower leg.  Patient reports her tetanus is 

up-to-date.  States she may have bumped the back of her head but is not 

completely sure.  She denies any loss of consciousness.  She denies any 

lightheadedness, his nose, shortness of breath, chest pain, one-sided weakness 

or paresthesias distal time.  Patient is on xeralto. 





- Related Data


                                Home Medications











 Medication  Instructions  Recorded  Confirmed


 


LORazepam [Ativan] 0.5 mg PO HS PRN 08/02/15 04/21/21


 


Omeprazole [PriLOSEC] 20 mg PO AC-BRKFST 08/02/15 04/21/21


 


Pravastatin Sodium [Pravachol] 40 mg PO HS 08/02/15 04/21/21


 


Rivaroxaban [Xarelto] 15 mg PO HS 08/02/15 04/21/21


 


Tiotropium 18 Mcg/Puff [Spiriva] 2 puff INHALATION RT-DAILY 08/02/15 04/21/21


 


rOPINIRole HCL [Requip] 1 mg PO HS 08/02/15 04/21/21


 


Cyanocobalamin (Vitamin B-12) 1,000 mcg PO DAILY 19





[Vitamin B-12]   


 


Donepezil HCl [Aricept] 10 mg PO DAILY 19


 


Latanoprost Ophth [Xalatan 0.005%] 1 drops BOTH EYES HS 19


 


Levothyroxine Sodium [Synthroid] 25 mcg PO DAILY 19


 


Spironolactone [Aldactone] 25 mg PO DAILY 19


 


Vit C/E/Zn/Coppr/Lutein/Zeaxan 2 cap PO DAILY 19





[Preservision Areds 2 Softgel]   


 


Furosemide [Lasix] 20 mg PO DAILY 21








                                  Previous Rx's











 Medication  Instructions  Recorded


 


Acetaminophen Tab [Tylenol] 650 mg PO Q6HR PRN  tab 21


 


Sennosides-Docusate Sodium 2 each PO BID  tab 21





[Senokot-S]  


 


lisinopriL [Zestril] 5 mg PO HS #30 tab 21











                                    Allergies











Allergy/AdvReac Type Severity Reaction Status Date / Time


 


Penicillins Allergy  Rash/Hives Verified 21 12:15


 


sulfamethoxazole Allergy  Rash/Hives Verified 21 12:15





[From Bactrim]     


 


trimethoprim [From Bactrim] Allergy  Rash/Hives Verified 21 12:15














Review of Systems


ROS Statement: 


Those systems with pertinent positive or pertinent negative responses have been 

documented in the HPI.





ROS Other: All systems not noted in ROS Statement are negative.





Past Medical History


Past Medical History: Atrial Fibrillation, Heart Failure, COPD, GERD/Reflux, GI 

Bleed, Hyperlipidemia, Hypertension


Additional Past Medical History / Comment(s): restless leg syndrome


History of Any Multi-Drug Resistant Organisms: None Reported


Past Surgical History: Appendectomy, Cholecystectomy, Hysterectomy, Joint 

Replacement, Orthopedic Surgery


Additional Past Surgical History / Comment(s): cataracts removed, bilateral kne

es


Past Anesthesia/Blood Transfusion Reactions: No Reported Reaction


Past Psychological History: Anxiety


Smoking Status: Former smoker


Past Alcohol Use History: None Reported


Past Drug Use History: None Reported





- Past Family History


  ** Father


Family Medical History: Coronary Artery Disease (CAD)


Additional Family Medical History / Comment(s): rhemuatic fever,  at 59 of 

"bad heart"





  ** Mother


Family Medical History: Cancer


Additional Family Medical History / Comment(s): uterus





General Exam


Limitations: no limitations


General appearance: alert, in no apparent distress


Head exam: Present: atraumatic, normocephalic, normal inspection.  Absent: other

(Negative Parks sign, raccoon eyes, hemotympanum.)


Eye exam: Present: normal appearance, PERRL, EOMI


Pupils: Present: normal accommodation


ENT exam: Present: normal exam, normal oropharynx, mucous membranes moist, TM's 

normal bilaterally, normal external ear exam


Neck exam: Present: normal inspection, full ROM.  Absent: tenderness


Respiratory exam: Present: normal lung sounds bilaterally.  Absent: respiratory 

distress, wheezes, rales


Cardiovascular Exam: Present: regular rate, normal rhythm, normal heart sounds. 

Absent: systolic murmur


GI/Abdominal exam: Present: soft.  Absent: distended, tenderness, guarding, r

ebound


Extremities exam: Present: full ROM, normal capillary refill, other (Palpable DP

and PT bilaterally.).  Absent: normal inspection (3 cm laceration to left lower 

leg), tenderness, pedal edema, joint swelling, calf tenderness


Back exam: Present: normal inspection, full ROM.  Absent: tenderness


Neurological exam: Present: alert, oriented X3


Psychiatric exam: Present: normal affect, normal mood


Skin exam: Present: warm, dry, intact, normal color





Course


                                   Vital Signs











  21





  12:16


 


Temperature 97.2 F L


 


Pulse Rate 84


 


Respiratory 20





Rate 


 


Blood Pressure 172/87


 


O2 Sat by Pulse 96





Oximetry 














Procedures





- Laceration


  ** Laceration #1


Consent Obtained: verbal consent


Indication: laceration


Site: lower extremity


Size (cm): 3


Description: linear, clean


Depth: simple, single layer


Sedation/Analgesia: none


Anesthetic Used: lidocaine 1%


Anesthesia Technique: local infiltration


Amount (mls): 3


Pre-repair: irrigated extensively, deep structures intact


Type of Sutures: nylon


Size of Sutures: 4-0


Number of Sutures: 4


Technique: simple, interrupted


Patient Tolerated Procedure: well, no complications





Medical Decision Making





- Medical Decision Making





89-year-old female presents to emergency department with a chief complaint of a 

fall.  On physical examination, patient has a laceration to the anterior aspect 

of the left lower leg.  No other signs of injury.  CT of the brain and C-spine 

is unremarkable.  CBC CMP coags and troponin within normal limits.  Based on 

description, this appears to be purely mechanical fall from a standing level.  

Laceration site was thoroughly irrigated and repaired with 4 sutures.  Patient 

tolerated procedure well.  Return parameters were thoroughly discussed the adore

ent is understanding and agreeable.  Case discussed with Dr. Deutsch





- Lab Data


Result diagrams: 


                                 21 13:16





                                 21 13:16


                                   Lab Results











  21 Range/Units





  13:16 13:16 13:16 


 


WBC  5.2    (3.8-10.6)  k/uL


 


RBC  4.17    (3.80-5.40)  m/uL


 


Hgb  12.8    (11.4-16.0)  gm/dL


 


Hct  38.3    (34.0-46.0)  %


 


MCV  91.9    (80.0-100.0)  fL


 


MCH  30.8    (25.0-35.0)  pg


 


MCHC  33.5    (31.0-37.0)  g/dL


 


RDW  12.6    (11.5-15.5)  %


 


Plt Count  189    (150-450)  k/uL


 


MPV  7.9    


 


Neutrophils %  79    %


 


Lymphocytes %  11    %


 


Monocytes %  6    %


 


Eosinophils %  1    %


 


Basophils %  1    %


 


Neutrophils #  4.1    (1.3-7.7)  k/uL


 


Lymphocytes #  0.6 L    (1.0-4.8)  k/uL


 


Monocytes #  0.3    (0-1.0)  k/uL


 


Eosinophils #  0.1    (0-0.7)  k/uL


 


Basophils #  0.0    (0-0.2)  k/uL


 


PT   11.6   (9.0-12.0)  sec


 


INR   1.1   (<1.2)  


 


APTT   24.0   (22.0-30.0)  sec


 


Sodium    137  (137-145)  mmol/L


 


Potassium    4.2  (3.5-5.1)  mmol/L


 


Chloride    102  ()  mmol/L


 


Carbon Dioxide    28  (22-30)  mmol/L


 


Anion Gap    7  mmol/L


 


BUN    11  (7-17)  mg/dL


 


Creatinine    0.80  (0.52-1.04)  mg/dL


 


Est GFR (CKD-EPI)AfAm    76  (>60 ml/min/1.73 sqM)  


 


Est GFR (CKD-EPI)NonAf    66  (>60 ml/min/1.73 sqM)  


 


Glucose    103 H  (74-99)  mg/dL


 


Calcium    9.5  (8.4-10.2)  mg/dL


 


Total Bilirubin    0.5  (0.2-1.3)  mg/dL


 


AST    23  (14-36)  U/L


 


ALT    9  (4-34)  U/L


 


Alkaline Phosphatase    113  ()  U/L


 


Troponin I     (0.000-0.034)  ng/mL


 


Total Protein    6.4  (6.3-8.2)  g/dL


 


Albumin    3.9  (3.5-5.0)  g/dL














  21 Range/Units





  13:16 


 


WBC   (3.8-10.6)  k/uL


 


RBC   (3.80-5.40)  m/uL


 


Hgb   (11.4-16.0)  gm/dL


 


Hct   (34.0-46.0)  %


 


MCV   (80.0-100.0)  fL


 


MCH   (25.0-35.0)  pg


 


MCHC   (31.0-37.0)  g/dL


 


RDW   (11.5-15.5)  %


 


Plt Count   (150-450)  k/uL


 


MPV   


 


Neutrophils %   %


 


Lymphocytes %   %


 


Monocytes %   %


 


Eosinophils %   %


 


Basophils %   %


 


Neutrophils #   (1.3-7.7)  k/uL


 


Lymphocytes #   (1.0-4.8)  k/uL


 


Monocytes #   (0-1.0)  k/uL


 


Eosinophils #   (0-0.7)  k/uL


 


Basophils #   (0-0.2)  k/uL


 


PT   (9.0-12.0)  sec


 


INR   (<1.2)  


 


APTT   (22.0-30.0)  sec


 


Sodium   (137-145)  mmol/L


 


Potassium   (3.5-5.1)  mmol/L


 


Chloride   ()  mmol/L


 


Carbon Dioxide   (22-30)  mmol/L


 


Anion Gap   mmol/L


 


BUN   (7-17)  mg/dL


 


Creatinine   (0.52-1.04)  mg/dL


 


Est GFR (CKD-EPI)AfAm   (>60 ml/min/1.73 sqM)  


 


Est GFR (CKD-EPI)NonAf   (>60 ml/min/1.73 sqM)  


 


Glucose   (74-99)  mg/dL


 


Calcium   (8.4-10.2)  mg/dL


 


Total Bilirubin   (0.2-1.3)  mg/dL


 


AST   (14-36)  U/L


 


ALT   (4-34)  U/L


 


Alkaline Phosphatase   ()  U/L


 


Troponin I  <0.012  (0.000-0.034)  ng/mL


 


Total Protein   (6.3-8.2)  g/dL


 


Albumin   (3.5-5.0)  g/dL














Disposition


Clinical Impression: 


 Fall, Laceration





Disposition: HOME SELF-CARE


Condition: Stable


Instructions (If sedation given, give patient instructions):  Care For Your 

Stitches (DC), Laceration (DC)


Additional Instructions: 


Please return to the emergency room in 12-14 days to have sutures removed.  

Please watch for any signs of infection which may include increased pain, 

swelling, redness, fever or chills.  Please return to emergency room for any 

signs of infection do occur.  Please use clean soap and water over the area to 

prevent scabbing over your stitches.  Please leave wound covered for the first 

24-48 hours and then leave wound open to air.  Please return to the emergency 

room for any other concerns.


Is patient prescribed a controlled substance at d/c from ED?: No


Referrals: 


Kurt Patel DO [Primary Care Provider] - 1-2 days


Time of Disposition: 14:34

## 2021-08-26 NOTE — CT
EXAMINATION TYPE: CT brain cspine wo con

 

DATE OF EXAM: 8/26/2021

 

COMPARISON: 9/11/2019

 

HISTORY: Fall today with head injury.

 

CT DLP: 1374.7 mGycm

 

Unenhanced CT of the brain was performed.  

 

The ventricles, basal cisterns and sulci overlying the cerebral convexities demonstrate mild enlargem
ent.  

 

There is no evidence for intracranial hemorrhage or sulcal effacement.  There is decreased attenuatio
n about the periventricular white matter and deep white matter of both cerebral hemispheres, compatib
le with chronic small vessel ischemia.  

 

No mass effects are seen.  

If symptoms persist consider MRI.  

 

Osseous calvarium is intact.

 

IMPRESSION:

 

1.  Age related atrophic and chronic small vessel ischemic change without acute intracranial process 
seen at this time.

 

CT Cervical Spine:

 

Unenhanced CT of the cervical spine was performed with bone and soft tissue window settings submitted
.  Coronal and sagittal reconstruction is obtained.  

 

There is normal alignment and prevertebral soft tissues. No evidence for acute cervical fracture .   
Scattered degenerative disc disease and spondylosis. Biapical scarring.   

 

IMPRESSION:

 

1.  No evidence for acute fracture or subluxation of the cervical spine.

## 2021-10-03 ENCOUNTER — HOSPITAL ENCOUNTER (OUTPATIENT)
Dept: HOSPITAL 47 - EC | Age: 86
Setting detail: OBSERVATION
LOS: 4 days | Discharge: HOME HEALTH | End: 2021-10-07
Attending: FAMILY MEDICINE | Admitting: FAMILY MEDICINE
Payer: MEDICARE

## 2021-10-03 DIAGNOSIS — F03.90: ICD-10-CM

## 2021-10-03 DIAGNOSIS — J44.9: ICD-10-CM

## 2021-10-03 DIAGNOSIS — N39.0: ICD-10-CM

## 2021-10-03 DIAGNOSIS — I50.33: ICD-10-CM

## 2021-10-03 DIAGNOSIS — Z87.19: ICD-10-CM

## 2021-10-03 DIAGNOSIS — Z87.891: ICD-10-CM

## 2021-10-03 DIAGNOSIS — Z90.710: ICD-10-CM

## 2021-10-03 DIAGNOSIS — Z82.49: ICD-10-CM

## 2021-10-03 DIAGNOSIS — I11.0: ICD-10-CM

## 2021-10-03 DIAGNOSIS — G25.81: ICD-10-CM

## 2021-10-03 DIAGNOSIS — Z80.49: ICD-10-CM

## 2021-10-03 DIAGNOSIS — I08.1: ICD-10-CM

## 2021-10-03 DIAGNOSIS — Z88.0: ICD-10-CM

## 2021-10-03 DIAGNOSIS — Z90.49: ICD-10-CM

## 2021-10-03 DIAGNOSIS — Z96.653: ICD-10-CM

## 2021-10-03 DIAGNOSIS — Z95.0: ICD-10-CM

## 2021-10-03 DIAGNOSIS — K21.9: ICD-10-CM

## 2021-10-03 DIAGNOSIS — I49.5: ICD-10-CM

## 2021-10-03 DIAGNOSIS — E78.5: ICD-10-CM

## 2021-10-03 DIAGNOSIS — Q21.1: ICD-10-CM

## 2021-10-03 DIAGNOSIS — Z79.890: ICD-10-CM

## 2021-10-03 DIAGNOSIS — Z91.81: ICD-10-CM

## 2021-10-03 DIAGNOSIS — Z88.2: ICD-10-CM

## 2021-10-03 DIAGNOSIS — Z98.49: ICD-10-CM

## 2021-10-03 DIAGNOSIS — Z20.822: ICD-10-CM

## 2021-10-03 DIAGNOSIS — Z79.899: ICD-10-CM

## 2021-10-03 DIAGNOSIS — I48.19: ICD-10-CM

## 2021-10-03 DIAGNOSIS — Z79.01: ICD-10-CM

## 2021-10-03 DIAGNOSIS — E86.0: ICD-10-CM

## 2021-10-03 DIAGNOSIS — F41.9: ICD-10-CM

## 2021-10-03 DIAGNOSIS — B96.20: ICD-10-CM

## 2021-10-03 DIAGNOSIS — Z88.1: ICD-10-CM

## 2021-10-03 DIAGNOSIS — I27.20: ICD-10-CM

## 2021-10-03 DIAGNOSIS — A41.50: Primary | ICD-10-CM

## 2021-10-03 DIAGNOSIS — R79.89: ICD-10-CM

## 2021-10-03 LAB
ALBUMIN SERPL-MCNC: 4.2 G/DL (ref 3.5–5)
ALP SERPL-CCNC: 124 U/L (ref 38–126)
ALT SERPL-CCNC: 15 U/L (ref 4–34)
ANION GAP SERPL CALC-SCNC: 11 MMOL/L
AST SERPL-CCNC: 50 U/L (ref 14–36)
BASOPHILS # BLD AUTO: 0 K/UL (ref 0–0.2)
BASOPHILS NFR BLD AUTO: 0 %
BUN SERPL-SCNC: 15 MG/DL (ref 7–17)
CALCIUM SPEC-MCNC: 9.6 MG/DL (ref 8.4–10.2)
CHLORIDE SERPL-SCNC: 97 MMOL/L (ref 98–107)
CO2 SERPL-SCNC: 23 MMOL/L (ref 22–30)
EOSINOPHIL # BLD AUTO: 0 K/UL (ref 0–0.7)
EOSINOPHIL NFR BLD AUTO: 0 %
ERYTHROCYTE [DISTWIDTH] IN BLOOD BY AUTOMATED COUNT: 4.39 M/UL (ref 3.8–5.4)
ERYTHROCYTE [DISTWIDTH] IN BLOOD: 12.1 % (ref 11.5–15.5)
GLUCOSE SERPL-MCNC: 149 MG/DL (ref 74–99)
HCT VFR BLD AUTO: 39.4 % (ref 34–46)
HGB BLD-MCNC: 13 GM/DL (ref 11.4–16)
HYALINE CASTS UR QL AUTO: 17 /LPF (ref 0–2)
LYMPHOCYTES # SPEC AUTO: 0.3 K/UL (ref 1–4.8)
LYMPHOCYTES NFR SPEC AUTO: 2 %
MAGNESIUM SPEC-SCNC: 1.6 MG/DL (ref 1.6–2.3)
MCH RBC QN AUTO: 29.6 PG (ref 25–35)
MCHC RBC AUTO-ENTMCNC: 33 G/DL (ref 31–37)
MCV RBC AUTO: 89.8 FL (ref 80–100)
MONOCYTES # BLD AUTO: 0.8 K/UL (ref 0–1)
MONOCYTES NFR BLD AUTO: 4 %
NEUTROPHILS # BLD AUTO: 17.8 K/UL (ref 1.3–7.7)
NEUTROPHILS NFR BLD AUTO: 93 %
PH UR: 5.5 [PH] (ref 5–8)
PLATELET # BLD AUTO: 194 K/UL (ref 150–450)
POTASSIUM SERPL-SCNC: 4.6 MMOL/L (ref 3.5–5.1)
PROT SERPL-MCNC: 7 G/DL (ref 6.3–8.2)
PROT UR QL: (no result)
RBC UR QL: 3 /HPF (ref 0–5)
SODIUM SERPL-SCNC: 131 MMOL/L (ref 137–145)
SP GR UR: 1.01 (ref 1–1.03)
SQUAMOUS UR QL AUTO: 9 /HPF (ref 0–4)
UROBILINOGEN UR QL STRIP: <2 MG/DL (ref ?–2)
WBC # BLD AUTO: 19.1 K/UL (ref 3.8–10.6)
WBC # UR AUTO: 16 /HPF (ref 0–5)

## 2021-10-03 PROCEDURE — 80048 BASIC METABOLIC PNL TOTAL CA: CPT

## 2021-10-03 PROCEDURE — 81001 URINALYSIS AUTO W/SCOPE: CPT

## 2021-10-03 PROCEDURE — 86140 C-REACTIVE PROTEIN: CPT

## 2021-10-03 PROCEDURE — 97161 PT EVAL LOW COMPLEX 20 MIN: CPT

## 2021-10-03 PROCEDURE — 83880 ASSAY OF NATRIURETIC PEPTIDE: CPT

## 2021-10-03 PROCEDURE — 84100 ASSAY OF PHOSPHORUS: CPT

## 2021-10-03 PROCEDURE — 85027 COMPLETE CBC AUTOMATED: CPT

## 2021-10-03 PROCEDURE — 96361 HYDRATE IV INFUSION ADD-ON: CPT

## 2021-10-03 PROCEDURE — 80053 COMPREHEN METABOLIC PANEL: CPT

## 2021-10-03 PROCEDURE — 93005 ELECTROCARDIOGRAM TRACING: CPT

## 2021-10-03 PROCEDURE — 83735 ASSAY OF MAGNESIUM: CPT

## 2021-10-03 PROCEDURE — 84484 ASSAY OF TROPONIN QUANT: CPT

## 2021-10-03 PROCEDURE — 87186 SC STD MICRODIL/AGAR DIL: CPT

## 2021-10-03 PROCEDURE — 94640 AIRWAY INHALATION TREATMENT: CPT

## 2021-10-03 PROCEDURE — 85025 COMPLETE CBC W/AUTO DIFF WBC: CPT

## 2021-10-03 PROCEDURE — 87635 SARS-COV-2 COVID-19 AMP PRB: CPT

## 2021-10-03 PROCEDURE — 36415 COLL VENOUS BLD VENIPUNCTURE: CPT

## 2021-10-03 PROCEDURE — 96366 THER/PROPH/DIAG IV INF ADDON: CPT

## 2021-10-03 PROCEDURE — 87086 URINE CULTURE/COLONY COUNT: CPT

## 2021-10-03 PROCEDURE — 96365 THER/PROPH/DIAG IV INF INIT: CPT

## 2021-10-03 PROCEDURE — 87077 CULTURE AEROBIC IDENTIFY: CPT

## 2021-10-03 PROCEDURE — 71045 X-RAY EXAM CHEST 1 VIEW: CPT

## 2021-10-03 PROCEDURE — 99285 EMERGENCY DEPT VISIT HI MDM: CPT

## 2021-10-03 PROCEDURE — 83605 ASSAY OF LACTIC ACID: CPT

## 2021-10-03 RX ADMIN — CEFAZOLIN SCH MLS/HR: 330 INJECTION, POWDER, FOR SOLUTION INTRAMUSCULAR; INTRAVENOUS at 08:49

## 2021-10-03 RX ADMIN — CEFAZOLIN SCH MLS/HR: 330 INJECTION, POWDER, FOR SOLUTION INTRAMUSCULAR; INTRAVENOUS at 20:14

## 2021-10-03 RX ADMIN — RIVAROXABAN SCH MG: 15 TABLET, FILM COATED ORAL at 20:05

## 2021-10-03 NOTE — ED
Extremity Problem HPI





- General


Chief complaint: Extremity Problem,Nontraumatic


Stated complaint: Knee Pain


Time Seen by Provider: 10/03/21 04:09


Source: patient, EMS


Mode of arrival: EMS





- History of Present Illness


Initial comments: 





This patient is an 89-year-old woman who is brought by ambulance after she came 

to [facility] to get off of the commode.  The patient states that she had gone to use 

the bathroom and number of hours ago.  Once she was there she was not able to 

get to her feet.  She states the weakness is bilateral.  She denies any 

focality.  She denies loss sensation.  She in fact does complain of a little bit

of burning type discomfort that she states is usual for her restless leg 

syndrome.  The patient did not have chest pain or dyspnea.  No nausea, vomiting,

diarrhea.


MD Complaint: other


-: hour(s)


Location: bilateral lower extremity


History of Same: No


-: No associated chest pain


Radiation: none


Quality: other (Weakness)


Improves with: nothing


Worsens with: nothing


Associated Symptoms: denies other symptoms





- Related Data


                                Home Medications











 Medication  Instructions  Recorded  Confirmed


 


LORazepam [Ativan] 0.5 mg PO HS PRN 08/02/15 10/03/21


 


Omeprazole [PriLOSEC] 20 mg PO DAILY 08/02/15 10/03/21


 


Pravastatin Sodium [Pravachol] 40 mg PO HS 08/02/15 10/03/21


 


Rivaroxaban [Xarelto] 15 mg PO HS 08/02/15 10/03/21


 


Tiotropium 18 Mcg/Puff [Spiriva] 2 cap INHALATION RT-DAILY 08/02/15 10/03/21


 


rOPINIRole HCL [Requip] 1 mg PO HS PRN 08/02/15 10/03/21


 


Cyanocobalamin (Vitamin B-12) 1,000 mcg PO DAILY 09/11/19 10/03/21





[Vitamin B-12]   


 


Donepezil HCl [Aricept] 10 mg PO DAILY 09/11/19 10/03/21


 


Latanoprost Ophth [Xalatan 0.005%] 1 drop BOTH EYES HS 09/11/19 10/03/21


 


Levothyroxine Sodium [Synthroid] 25 mcg PO DAILY 09/11/19 10/03/21


 


Spironolactone [Aldactone] 25 mg PO DAILY 09/11/19 10/03/21


 


Vit C/E/Zn/Coppr/Lutein/Zeaxan 1 cap PO BID 09/11/19 10/03/21





[Preservision Areds 2 Softgel]   


 


Furosemide [Lasix] 20 mg PO DAILY 04/21/21 10/03/21


 


Lisinopril [Prinivil] 10 mg PO AS DIRECTED@2100 10/03/21 10/03/21


 


Psyllium Husk [Metamucil] 0.4 gm PO TID 10/03/21 10/03/21











                                    Allergies











Allergy/AdvReac Type Severity Reaction Status Date / Time


 


Penicillins Allergy  Rash/Hives Verified 10/03/21 09:04


 


sulfamethoxazole Allergy  Rash/Hives Verified 10/03/21 09:04





[From Bactrim]     


 


trimethoprim [From Bactrim] Allergy  Rash/Hives Verified 10/03/21 09:04














Review of Systems


ROS Statement: 


Those systems with pertinent positive or pertinent negative responses have been 

documented in the HPI.





ROS Other: All systems not noted in ROS Statement are negative.


Constitutional: Reports: weakness.  Denies: fever, chills


Respiratory: Denies: cough, dyspnea


Cardiovascular: Denies: chest pain, palpitations, edema


Gastrointestinal: Denies: abdominal pain, vomiting, diarrhea


Genitourinary: Denies: dysuria, hematuria


Musculoskeletal: Denies: back pain


Skin: Denies: rash


Neurological: Denies: headache, weakness, numbness





Past Medical History


Past Medical History: Atrial Fibrillation, Heart Failure, COPD, GERD/Reflux, GI 

Bleed, Hyperlipidemia, Hypertension


Additional Past Medical History / Comment(s): restless leg syndrome


History of Any Multi-Drug Resistant Organisms: None Reported


Past Surgical History: Appendectomy, Cholecystectomy, Hysterectomy, Joint 

Replacement, Orthopedic Surgery


Additional Past Surgical History / Comment(s): cataracts removed, bilateral 

knees


Past Anesthesia/Blood Transfusion Reactions: No Reported Reaction


Past Psychological History: Anxiety


Smoking Status: Former smoker


Past Alcohol Use History: None Reported


Past Drug Use History: None Reported





- Past Family History


  ** Father


Family Medical History: Coronary Artery Disease (CAD)


Additional Family Medical History / Comment(s): rhemuatic fever,  at 59 of 

"bad heart"





  ** Mother


Family Medical History: Cancer


Additional Family Medical History / Comment(s): uterus





General Exam


General appearance: alert, in no apparent distress


Head exam: Present: atraumatic, normocephalic


Eye exam: Present: normal appearance.  Absent: scleral icterus, conjunctival 

injection


ENT exam: Present: normal oropharynx


Respiratory exam: Present: normal lung sounds bilaterally.  Absent: respiratory 

distress, wheezes, rales, rhonchi, stridor


Cardiovascular Exam: Present: regular rate, normal rhythm, normal heart sounds. 

Absent: systolic murmur, diastolic murmur, rubs, gallop


GI/Abdominal exam: Present: soft.  Absent: distended, tenderness, guarding, 

rebound, rigid, mass


Extremities exam: Present: normal inspection, normal capillary refill.  Absent: 

pedal edema, calf tenderness


Back exam: Present: normal inspection.  Absent: CVA tenderness (R), CVA 

tenderness (L)


Neurological exam: Present: alert


Skin exam: Present: warm, dry, intact, normal color.  Absent: rash





Course


                                   Vital Signs











  10/03/21 10/03/21 10/03/21





  03:48 04:10 05:54


 


Temperature 98.9 F  


 


Pulse Rate 74 70 72


 


Pulse Rate [   





Pulse Oximetery   





]   


 


Respiratory 20 20 20





Rate   


 


Blood Pressure 148/65 124/84 127/89


 


Blood Pressure   





[Right Arm]   


 


O2 Sat by Pulse 95 97 96





Oximetry   














  10/03/21 10/03/21 10/03/21





  06:56 09:44 09:46


 


Temperature   98.3 F


 


Pulse Rate 71 82 


 


Pulse Rate [   64





Pulse Oximetery   





]   


 


Respiratory 20 18 20





Rate   


 


Blood Pressure 124/84 156/68 


 


Blood Pressure   143/91





[Right Arm]   


 


O2 Sat by Pulse 96 97 100





Oximetry   














  10/03/21





  12:05


 


Temperature 98.0 F


 


Pulse Rate 63


 


Pulse Rate [ 





Pulse Oximetery 





] 


 


Respiratory 16





Rate 


 


Blood Pressure 148/89


 


Blood Pressure 





[Right Arm] 


 


O2 Sat by Pulse 97





Oximetry 














Medical Decision Making





- Lab Data


Result diagrams: 


                                 10/05/21 07:41





                                 10/05/21 07:41


                                   Lab Results











  10/03/21 10/03/21 10/03/21 Range/Units





  04:51 04:51 04:51 


 


WBC  19.1 H    (3.8-10.6)  k/uL


 


RBC  4.39    (3.80-5.40)  m/uL


 


Hgb  13.0    (11.4-16.0)  gm/dL


 


Hct  39.4    (34.0-46.0)  %


 


MCV  89.8    (80.0-100.0)  fL


 


MCH  29.6    (25.0-35.0)  pg


 


MCHC  33.0    (31.0-37.0)  g/dL


 


RDW  12.1    (11.5-15.5)  %


 


Plt Count  194    (150-450)  k/uL


 


MPV  7.8    


 


Neutrophils %  93    %


 


Lymphocytes %  2    %


 


Monocytes %  4    %


 


Eosinophils %  0    %


 


Basophils %  0    %


 


Neutrophils #  17.8 H    (1.3-7.7)  k/uL


 


Lymphocytes #  0.3 L    (1.0-4.8)  k/uL


 


Monocytes #  0.8    (0-1.0)  k/uL


 


Eosinophils #  0.0    (0-0.7)  k/uL


 


Basophils #  0.0    (0-0.2)  k/uL


 


Sodium    131 L  (137-145)  mmol/L


 


Potassium    4.6  (3.5-5.1)  mmol/L


 


Chloride    97 L  ()  mmol/L


 


Carbon Dioxide    23  (22-30)  mmol/L


 


Anion Gap    11  mmol/L


 


BUN    15  (7-17)  mg/dL


 


Creatinine    1.00  (0.52-1.04)  mg/dL


 


Est GFR (CKD-EPI)AfAm    58  (>60 ml/min/1.73 sqM)  


 


Est GFR (CKD-EPI)NonAf    50  (>60 ml/min/1.73 sqM)  


 


Glucose    149 H  (74-99)  mg/dL


 


Plasma Lactic Acid Ravindra     (0.7-2.0)  mmol/L


 


Calcium    9.6  (8.4-10.2)  mg/dL


 


Phosphorus    3.0  (2.5-4.5)  mg/dL


 


Magnesium    1.6  (1.6-2.3)  mg/dL


 


Total Bilirubin    1.4 H  (0.2-1.3)  mg/dL


 


AST    50 H  (14-36)  U/L


 


ALT    15  (4-34)  U/L


 


Alkaline Phosphatase    124  ()  U/L


 


Troponin I     (0.000-0.034)  ng/mL


 


C-Reactive Protein    2.4 H  (<1.0)  mg/dL


 


NT-Pro-B Natriuret Pep     pg/mL


 


Total Protein    7.0  (6.3-8.2)  g/dL


 


Albumin    4.2  (3.5-5.0)  g/dL


 


Urine Color   Yellow   


 


Urine Appearance   Cloudy H   (Clear)  


 


Urine pH   5.5   (5.0-8.0)  


 


Ur Specific Gravity   1.013   (1.001-1.035)  


 


Urine Protein   1+ H   (Negative)  


 


Urine Glucose (UA)   Negative   (Negative)  


 


Urine Ketones   Negative   (Negative)  


 


Urine Blood   Small H   (Negative)  


 


Urine Nitrite   Negative   (Negative)  


 


Urine Bilirubin   Negative   (Negative)  


 


Urine Urobilinogen   <2.0   (<2.0)  mg/dL


 


Ur Leukocyte Esterase   Large H   (Negative)  


 


Urine RBC   3   (0-5)  /hpf


 


Urine WBC   16 H   (0-5)  /hpf


 


Ur Squamous Epith Cells   9 H   (0-4)  /hpf


 


Calcium Oxalate Crystal   Rare H   (None)  /hpf


 


Urine Bacteria   Few H   (None)  /hpf


 


Hyaline Casts   17 H   (0-2)  /lpf


 


Urine Mucus   Rare H   (None)  /hpf


 


Urine Yeast (Budding)   Rare H   (None)  /hpf














  10/03/21 10/03/21 10/03/21 Range/Units





  04:51 04:51 04:51 


 


WBC     (3.8-10.6)  k/uL


 


RBC     (3.80-5.40)  m/uL


 


Hgb     (11.4-16.0)  gm/dL


 


Hct     (34.0-46.0)  %


 


MCV     (80.0-100.0)  fL


 


MCH     (25.0-35.0)  pg


 


MCHC     (31.0-37.0)  g/dL


 


RDW     (11.5-15.5)  %


 


Plt Count     (150-450)  k/uL


 


MPV     


 


Neutrophils %     %


 


Lymphocytes %     %


 


Monocytes %     %


 


Eosinophils %     %


 


Basophils %     %


 


Neutrophils #     (1.3-7.7)  k/uL


 


Lymphocytes #     (1.0-4.8)  k/uL


 


Monocytes #     (0-1.0)  k/uL


 


Eosinophils #     (0-0.7)  k/uL


 


Basophils #     (0-0.2)  k/uL


 


Sodium     (137-145)  mmol/L


 


Potassium     (3.5-5.1)  mmol/L


 


Chloride     ()  mmol/L


 


Carbon Dioxide     (22-30)  mmol/L


 


Anion Gap     mmol/L


 


BUN     (7-17)  mg/dL


 


Creatinine     (0.52-1.04)  mg/dL


 


Est GFR (CKD-EPI)AfAm     (>60 ml/min/1.73 sqM)  


 


Est GFR (CKD-EPI)NonAf     (>60 ml/min/1.73 sqM)  


 


Glucose     (74-99)  mg/dL


 


Plasma Lactic Acid Ravindra  1.4    (0.7-2.0)  mmol/L


 


Calcium     (8.4-10.2)  mg/dL


 


Phosphorus     (2.5-4.5)  mg/dL


 


Magnesium     (1.6-2.3)  mg/dL


 


Total Bilirubin     (0.2-1.3)  mg/dL


 


AST     (14-36)  U/L


 


ALT     (4-34)  U/L


 


Alkaline Phosphatase     ()  U/L


 


Troponin I   0.049 H*   (0.000-0.034)  ng/mL


 


C-Reactive Protein     (<1.0)  mg/dL


 


NT-Pro-B Natriuret Pep    2540  pg/mL


 


Total Protein     (6.3-8.2)  g/dL


 


Albumin     (3.5-5.0)  g/dL


 


Urine Color     


 


Urine Appearance     (Clear)  


 


Urine pH     (5.0-8.0)  


 


Ur Specific Gravity     (1.001-1.035)  


 


Urine Protein     (Negative)  


 


Urine Glucose (UA)     (Negative)  


 


Urine Ketones     (Negative)  


 


Urine Blood     (Negative)  


 


Urine Nitrite     (Negative)  


 


Urine Bilirubin     (Negative)  


 


Urine Urobilinogen     (<2.0)  mg/dL


 


Ur Leukocyte Esterase     (Negative)  


 


Urine RBC     (0-5)  /hpf


 


Urine WBC     (0-5)  /hpf


 


Ur Squamous Epith Cells     (0-4)  /hpf


 


Calcium Oxalate Crystal     (None)  /hpf


 


Urine Bacteria     (None)  /hpf


 


Hyaline Casts     (0-2)  /lpf


 


Urine Mucus     (None)  /hpf


 


Urine Yeast (Budding)     (None)  /hpf














- EKG Data


-: EKG Interpreted by Me


EKG shows normal: axis (Normal), intervals (Normal)


Interpretation: nonspecific ST-T wave changes, other (There is an intermittent 

paced rhythm, rate 72 bpm)





Disposition


Clinical Impression: 


 Leukocytosis, Urinary tract infection, Elevated troponin





Disposition: ADMITTED AS IP TO THIS HOSP


Condition: Fair

## 2021-10-03 NOTE — XR
EXAMINATION TYPE: XR chest 1V portable

 

DATE OF EXAM: 10/3/2021

 

COMPARISON: 4/24/2021

 

HISTORY: Weakness

 

TECHNIQUE: Single view

 

FINDINGS: Heart is enlarged. There is mild pulmonary congestion. There is no definite pleural effusio
n. There is left axillary pacemaker.

 

IMPRESSION: Mild pulmonary congestion and cardiomegaly. Pulmonary vascularity increased compared to o
ld exam.

## 2021-10-04 RX ADMIN — PRAVASTATIN SODIUM SCH MG: 40 TABLET ORAL at 20:01

## 2021-10-04 RX ADMIN — LATANOPROST SCH DROPS: 50 SOLUTION OPHTHALMIC at 20:01

## 2021-10-04 RX ADMIN — I-VITE, TAB 1000-60-2MG (60/BT) SCH EACH: TAB at 20:48

## 2021-10-04 RX ADMIN — FUROSEMIDE SCH MG: 20 TABLET ORAL at 17:30

## 2021-10-04 RX ADMIN — SPIRONOLACTONE SCH MG: 25 TABLET, FILM COATED ORAL at 17:30

## 2021-10-04 RX ADMIN — PSYLLIUM HUSK SCH: 3.4 POWDER ORAL at 20:02

## 2021-10-04 RX ADMIN — I-VITE, TAB 1000-60-2MG (60/BT) SCH EACH: TAB at 20:03

## 2021-10-04 RX ADMIN — PANTOPRAZOLE SODIUM SCH MG: 40 TABLET, DELAYED RELEASE ORAL at 09:13

## 2021-10-04 RX ADMIN — ACETAMINOPHEN PRN MG: 325 TABLET, FILM COATED ORAL at 20:02

## 2021-10-04 RX ADMIN — RIVAROXABAN SCH MG: 15 TABLET, FILM COATED ORAL at 20:01

## 2021-10-04 NOTE — P.CRDCN
History of Present Illness


History of present illness: 


Patient is a pleasant 89 yo female with history of mild dementia, chronic Afib 

on Xarelto, HLD, HTN, chronic diastolic heart failure, pulmonary hypertension, 

tricuspid regurgitation, moderate to severe mitral regurgitation, sick sinus 

syndrome s/p His bundle permanent pacemaker in 2021, former smoker. She 

follows in the office with Dr Flaherty. We are consulted for elevated troponin. 

Patient presented to the emergency department after she became weak and could 

not ambulate from her commode.  Patient states she is going to use the bathroom 

and did not have enough strength to stand up from the commode.  She states been 

having increased bilateral lower extremity weakness.  Patient denies any chest 

pain, shortness of breath, lightheadedness, dizziness, syncope, palpitations, sy

mptoms of orthopnea or PND.  She states she did have a mechanical fall at the 

end of August requiring stitches of her right shin. 





DIAGNOSTICS


EKG- Paced rhythm, HR 72, appears to be his bundle paced and also v-paced 


Echocardiogram- 3/23/2021 in the office which showed EF 55% grade 3 diastolic 

dysfunction, PFO, moderate to severe MR, moderate TR, RVSP 63. 


Chest xray-mild pulmonary congestion and cardiomegaly.  Pulmonary vascularity 

increased compared to old exam.


Laboratory data, WBC 19.1, sodium 131, potassium 4.6, BUN 15, serum creatinine 

1.0, magnesium 1.6, troponin 0.043, CRP 2.4, proBNP 2540, COVID-19 PCR negative


Current daily cardiac medications Spironolactone 25 mg daily, Xarelto 50 mg 

nightly, pravastatin, lisinopril, Lasix 20 mg daily





REVIEW OF SYSTEMS


At the time of my exam:


CONSTITUTIONAL: +weakness Denies fever or chills.


CARDIOVASCULAR: Denies chest pain, shortness of breath, no orthopnea, PND or 

palpitations.


RESPIRATORY: Denies cough. 


GASTROINTESTINAL: Denies abdominal pain, diarrhea, constipation, nausea or 

vomiting.


MUSCULOSKELETAL: Denies myalgias.


NEUROLOGIC: Denies numbness, tingling, headacbe or weakness.


ENDOCRINE: Denies fatigue, weight change,  polydipsia or polyurina.


GENITOURINARY: Denies burning, hematuria or urgency with micturation.


HEMATOLOGIC: Denies history of anemia or bleeding. 





PHYSICAL EXAMINATION


Vitals reviewed


CONSTITUTIONAL: No apparent distress. 


HEENT: Head is normocephalic. Pupils are equal, round. Mucous membranes of the 

mouth are dry.  No JVD. No carotid bruit.


CHEST EXAMINATION: Lungs are clear to auscultation. No chest wall tenderness is 

noted on palpation or with deep breathing. 


HEART EXAMINATION: Regular rate and rhythm. S1, S2 heard. + systolic murmur, no 

gallops or rub.


ABDOMEN: Soft, Positive bowel sounds.


EXTREMITIES: 2+ peripheral pulses, no LE edema


NEUROLOGIC EXAMINATION: No focal deficits noted 





ASSESSMENT


Elevated troponin, 0.04x 3, not indicative of acute coronary syndrome


Chronic persistent atrial fibrillation on Xarelto 


Symptomatic bradycardia, sick sinus syndrome s/p His bundle PPM placement 

21


Acute on chronic diastolic heart failure, appears euvolemic s/p Lasix


Moderate to severe mitral regurgitation


Essential hypertension


Mild dementia


Pulmonary hypertension 





PLAN


-Interrogate patient's device, we may have to adjust her thresholds 


-Orthostatic vital signs 


-Continue home cardiac medications, Lasix 20mg daily, lisinoril, spironolactone 

and adjust as needed


-Continue Xarelto 


-Further recommendations based on clinical course











Past Medical History


Past Medical History: Atrial Fibrillation, Heart Failure, COPD, GERD/Reflux, GI 

Bleed, Hyperlipidemia, Hypertension


Additional Past Medical History / Comment(s): restless leg syndrome


History of Any Multi-Drug Resistant Organisms: None Reported


Past Surgical History: Appendectomy, Cholecystectomy, Hysterectomy, Joint 

Replacement, Orthopedic Surgery, Pacemaker


Additional Past Surgical History / Comment(s): cataracts removed, bilateral 

knees


Past Anesthesia/Blood Transfusion Reactions: No Reported Reaction


Type of Cardiac Device: Biventricular Pacemaker


Device Placement Date:: 2021


Past Psychological History: Anxiety


Smoking Status: Former smoker


Past Alcohol Use History: None Reported


Past Drug Use History: None Reported





- Past Family History


  ** Father


Family Medical History: Coronary Artery Disease (CAD)


Additional Family Medical History / Comment(s): rhemuatic fever,  at 59 of 

"bad heart"





  ** Mother


Family Medical History: Cancer


Additional Family Medical History / Comment(s): uterus





Medications and Allergies


                                Home Medications











 Medication  Instructions  Recorded  Confirmed  Type


 


LORazepam [Ativan] 0.5 mg PO HS PRN 08/02/15 10/03/21 History


 


Omeprazole [PriLOSEC] 20 mg PO DAILY 08/02/15 10/03/21 History


 


Pravastatin Sodium [Pravachol] 40 mg PO HS 08/02/15 10/03/21 History


 


Rivaroxaban [Xarelto] 15 mg PO HS 08/02/15 10/03/21 History


 


Tiotropium 18 Mcg/Puff [Spiriva] 2 cap INHALATION RT-DAILY 08/02/15 10/03/21 

History


 


rOPINIRole HCL [Requip] 1 mg PO HS PRN 08/02/15 10/03/21 History


 


Cyanocobalamin (Vitamin B-12) 1,000 mcg PO DAILY 09/11/19 10/03/21 History





[Vitamin B-12]    


 


Donepezil HCl [Aricept] 10 mg PO DAILY 09/11/19 10/03/21 History


 


Latanoprost Ophth [Xalatan 0.005%] 1 drop BOTH EYES HS 09/11/19 10/03/21 History


 


Levothyroxine Sodium [Synthroid] 25 mcg PO DAILY 09/11/19 10/03/21 History


 


Spironolactone [Aldactone] 25 mg PO DAILY 09/11/19 10/03/21 History


 


Vit C/E/Zn/Coppr/Lutein/Zeaxan 1 cap PO BID 09/11/19 10/03/21 History





[Preservision Areds 2 Softgel]    


 


Furosemide [Lasix] 20 mg PO DAILY 04/21/21 10/03/21 History


 


Lisinopril [Prinivil] 10 mg PO AS DIRECTED@2100 10/03/21 10/03/21 History


 


Psyllium Husk [Metamucil] 0.4 gm PO TID 10/03/21 10/03/21 History








                                    Allergies











Allergy/AdvReac Type Severity Reaction Status Date / Time


 


Penicillins Allergy  Rash/Hives Verified 10/03/21 09:04


 


sulfamethoxazole Allergy  Rash/Hives Verified 10/03/21 09:04





[From Bactrim]     


 


trimethoprim [From Bactrim] Allergy  Rash/Hives Verified 10/03/21 09:04














Physical Exam


Vitals: 


                                   Vital Signs











  Temp Pulse Pulse Resp BP BP Pulse Ox


 


 10/04/21 04:00  98.3 F   76  18   135/84  99


 


 10/04/21 02:00    69  18   


 


 10/03/21 23:24  98.2 F   69  18   142/69  97


 


 10/03/21 20:00  98.0 F   61  18   151/67  99


 


 10/03/21 16:00  98.1 F   64  18   180/83  99


 


 10/03/21 14:00    64  20   


 


 10/03/21 12:05  98.0 F  63   16  148/89   97


 


 10/03/21 09:46  98.3 F   64  20   143/91  100


 


 10/03/21 09:44   82   18  156/68   97








                                Intake and Output











 10/03/21 10/04/21 10/04/21





 22:59 06:59 14:59


 


Intake Total 240 600 


 


Output Total 100 450 


 


Balance 140 150 


 


Intake:   


 


  Intake, IV Titration  600 





  Amount   


 


    Sodium Chloride 0.9% 1,  600 





    000 ml @ 75 mls/hr IV .   





    [id number] [facility] Rx#:[id number]   


 


  Oral 240  


 


Output:   


 


  Urine 100 450 


 


Other:   


 


  Voiding Method Bedside Commode Bedside Commode 





 Indwelling Catheter Indwelling Catheter 


 


  # Voids 1 1 


 


  # Bowel Movements 1  


 


  Weight  82 kg 














Results





                                 10/03/21 04:51





                                 10/03/21 04:51


                                 Cardiac Enzymes











  10/03/21 10/03/21 Range/Units





  10:05 13:39 


 


Troponin I  0.049 H*  0.047 H*  (0.000-0.034)  ng/mL








                               Current Medications











Generic Name Dose Route Start Last Admin





  Trade Name Freq  PRN Reason Stop Dose Admin


 


Sodium Chloride  1,000 mls @ 75 mls/hr  10/03/21 08:00  10/03/21 20:14





  Saline 0.9%  IV   75 mls/hr





  .[id number] KAMRYN   Administration


 


Lisinopril  10 mg  10/03/21 21:00 





  Lisinopril 10 Mg Tab  PO  





  AS DIRECTED@2100 KAMRYN  


 


Naloxone HCl  0.2 mg  10/03/21 07:56 





  Naloxone 0.4 Mg/Ml 1 Ml Vial  IV  





  Q2M PRN  





  Opioid Reversal  


 


Pantoprazole Sodium  40 mg  10/04/21 09:00 





  Pantoprazole 40 Mg Tablet  PO  





  DAILY KAMRYN  


 


Rivaroxaban  15 mg  10/03/21 21:00  10/03/21 20:05





  Rivaroxaban 15 Mg Tab  PO   15 mg





  HS KAMRYN   Administration





  Protocol  








                                Intake and Output











 10/03/21 10/04/21 10/04/21





 22:59 06:59 14:59


 


Intake Total 240 600 


 


Output Total 100 450 


 


Balance 140 150 


 


Intake:   


 


  Intake, IV Titration  600 





  Amount   


 


    Sodium Chloride 0.9% 1,  600 





    000 ml @ 75 mls/hr IV .   





    [id number] [facility] Rx#:[id number]   


 


  Oral 240  


 


Output:   


 


  Urine 100 450 


 


Other:   


 


  Voiding Method Bedside Commode Bedside Commode 





 Indwelling Catheter Indwelling Catheter 


 


  # Voids 1 1 


 


  # Bowel Movements 1  


 


  Weight  82 kg 








                                        





                                 10/03/21 04:51 





                                 10/03/21 04:51

## 2021-10-04 NOTE — P.HPIM
History of Present Illness


H&P Date: 10/04/21





This patient is an 89-year-old woman was addmitted after she became to weak to 

get off of the commode.  The patient states that she had gone to use the 

bathroom and number of hours and once she was there she was not able to get to 

her feet.  She states the weakness is bilateral.  She denies any loss of 

movement.  She denies loss sensation. 





Review of Systems





GENERAL: Patient denies fever. Denies chills.


EYES: Denies blurred vision. Denies vision changes. Denies eye pain.


EARS, NOSE, MOUTH, & THROAT: Denies headache. Denies sore throat. Denies ear 

pain.


RESPIRATORY: Denies cough. Denies shortness of breath. Denies sputum production.

Denies hemoptysis. 


CARDIOVASCULAR: Denies chest pain or pressure. Denies palpitations. Denies 

arrhythmias.


GASTROINTESTINAL: Denies abdominal pain. Denies diarrhea. Denies constipation. 

Denies nausea. Denies vomiting. Denies heartburn. Denies blood in the stool. 


GENITOURINARY: Admitsurinary frequency and burning. Denies dysuria. denies 

hematuria.


MUSCULOSKELETAL:  Denies myalgias. Denies joint swelling. Denies decreased range

of motion beyond patients baseline.


INTEGUMENTARY: Denies pruitis. Denies rash.


PSYCHIATRIC: Denies suicidal or homicial ideations.


ENDOCRINE: Denies weight change. Denies polydipsia. Denies polyuria.


HEMATOLOGIC: Denies bleeding disorders. 





Past Medical History


Past Medical History: Atrial Fibrillation, Heart Failure, COPD, GERD/Reflux, GI 

Bleed, Hyperlipidemia, Hypertension


Additional Past Medical History / Comment(s): restless leg syndrome


History of Any Multi-Drug Resistant Organisms: None Reported


Past Surgical History: Appendectomy, Cholecystectomy, Hysterectomy, Joint 

Replacement, Orthopedic Surgery, Pacemaker


Additional Past Surgical History / Comment(s): cataracts removed, bilateral 

knees


Past Anesthesia/Blood Transfusion Reactions: No Reported Reaction


Type of Cardiac Device: Biventricular Pacemaker


Device Placement Date:: 2021


Past Psychological History: Anxiety


Smoking Status: Former smoker


Past Alcohol Use History: None Reported


Past Drug Use History: None Reported





- Past Family History


  ** Father


Family Medical History: Coronary Artery Disease (CAD)


Additional Family Medical History / Comment(s): rhemuatic fever,  at 59 of 

"bad heart"





  ** Mother


Family Medical History: Cancer


Additional Family Medical History / Comment(s): uterus





Medications and Allergies


                                Home Medications











 Medication  Instructions  Recorded  Confirmed  Type


 


LORazepam [Ativan] 0.5 mg PO HS PRN 08/02/15 10/03/21 History


 


Omeprazole [PriLOSEC] 20 mg PO DAILY 08/02/15 10/03/21 History


 


Pravastatin Sodium [Pravachol] 40 mg PO HS 08/02/15 10/03/21 History


 


Rivaroxaban [Xarelto] 15 mg PO HS 08/02/15 10/03/21 History


 


Tiotropium 18 Mcg/Puff [Spiriva] 2 cap INHALATION RT-DAILY 08/02/15 10/03/21 

History


 


rOPINIRole HCL [Requip] 1 mg PO HS PRN 08/02/15 10/03/21 History


 


Cyanocobalamin (Vitamin B-12) 1,000 mcg PO DAILY 09/11/19 10/03/21 History





[Vitamin B-12]    


 


Donepezil HCl [Aricept] 10 mg PO DAILY 09/11/19 10/03/21 History


 


Latanoprost Ophth [Xalatan 0.005%] 1 drop BOTH EYES HS 09/11/19 10/03/21 History


 


Levothyroxine Sodium [Synthroid] 25 mcg PO DAILY 09/11/19 10/03/21 History


 


Spironolactone [Aldactone] 25 mg PO DAILY 09/11/19 10/03/21 History


 


Vit C/E/Zn/Coppr/Lutein/Zeaxan 1 cap PO BID 09/11/19 10/03/21 History





[Preservision Areds 2 Softgel]    


 


Furosemide [Lasix] 20 mg PO DAILY 04/21/21 10/03/21 History


 


Lisinopril [Prinivil] 10 mg PO AS DIRECTED@2100 10/03/21 10/03/21 History


 


Psyllium Husk [Metamucil] 0.4 gm PO TID 10/03/21 10/03/21 History








                                    Allergies











Allergy/AdvReac Type Severity Reaction Status Date / Time


 


Penicillins Allergy  Rash/Hives Verified 10/03/21 09:04


 


sulfamethoxazole Allergy  Rash/Hives Verified 10/03/21 09:04





[From Bactrim]     


 


trimethoprim [From Bactrim] Allergy  Rash/Hives Verified 10/03/21 09:04














Physical Exam


Osteopathic Statement: *.  No significant issues noted on an osteopathic 

structural exam other than those noted in the History and Physical/Consult.


Vitals: 


                                   Vital Signs











  Temp Pulse Resp BP Pulse Ox


 


 10/04/21 08:00  98.0 F  62  18  104/70  100


 


 10/04/21 04:00  98.3 F  76  18  135/84  99


 


 10/04/21 02:00   69  18  


 


 10/03/21 23:24  98.2 F  69  18  142/69  97


 


 10/03/21 20:00  98.0 F  61  18  151/67  99


 


 10/03/21 16:00  98.1 F  64  18  180/83  99


 


 10/03/21 14:00   64  20  








                                Intake and Output











 10/03/21 10/04/21 10/04/21





 22:59 06:59 14:59


 


Intake Total 240 600 240


 


Output Total 100 450 


 


Balance 140 150 240


 


Intake:   


 


  Intake, IV Titration  600 





  Amount   


 


    Sodium Chloride 0.9% 1,  600 





    000 ml @ 75 mls/hr IV .   





    [id number] [facility] Rx#:[id number]   


 


  Oral 240  240


 


Output:   


 


  Urine 100 450 


 


Other:   


 


  Voiding Method Bedside Commode Bedside Commode Bedside Commode





 Indwelling Catheter Indwelling Catheter Indwelling Catheter


 


  # Voids 1 1 


 


  # Bowel Movements 1  


 


  Weight  82 kg 














GENERAL: This is a -89 year-old   in no apparent distress at the time 

of examination. Pleasant and cooperative.


HEENT: Head is atraumatic, normocephalic. Pupils are equal, round, and reactive 

to light. Sclerae anicteric. Conjunctivae are clear. Mucus membranes of the 

mouth are moist. Neck is supple. 


RESPIRATORY: Clear to auscultation. No wheezes, rales, or rhonchi.  No use of 

accessory muscles.  Patient maintaining oxygen saturation greater than 92%. No 

chest wall tenderness is noted on palpation or with deep breathing.


CARDIOVASCULAR: Irregular rate and rhythm. S1 and S2 noted.  No JVD noted.


GASTROINTESTINAL: No distention noted.  Abdomen soft and round.  Normal active 

bowel sounds auscultated x 4 quadrants.  No pain or tenderness noted upon 

palpation.


INTEGUMENTARY: No cyanosis. No jaundice. No rashes noted. No cellulitis noted.


EXTREMITIES: 2+ peripheral pulses. No evidence of peripheral edema. No calf 

tenderness noted.


NEUROLOGIC:  Cranial nerves II-XII intact. Moving all extremities X 4.


PSYCHIATRIC: Awake, alert, and oriented X 3. Appropriate affect. 





Results


CBC & Chem 7: 


                                 10/03/21 04:51





                                 10/03/21 04:51


Labs: 


                  Abnormal Lab Results - Last 24 Hours (Table)











  10/03/21 Range/Units





  13:39 


 


Troponin I  0.047 H*  (0.000-0.034)  ng/mL








                      Microbiology - Last 24 Hours (Table)











 10/03/21 04:51 Urine Culture - Preliminary





 Urine,Voided 














Assessment and Plan


(1) Sepsis due to gram-negative urinary tract infection


Current Visit: Yes   Status: Acute   Code(s): A41.50 - GRAM-NEGATIVE SEPSIS, 

UNSPECIFIED; N39.0 - URINARY TRACT INFECTION, SITE NOT SPECIFIED   SNOMED 

Code(s): 972467800


   





(2) Elevated troponin


Current Visit: Yes   Status: Acute   Code(s): R77.8 - OTHER SPECIFIED 

ABNORMALITIES OF PLASMA PROTEINS   SNOMED Code(s): 765653158


   





(3) Weakness


Current Visit: Yes   Status: Acute   Code(s): R53.1 - WEAKNESS   SNOMED Code(s):

00741192


   





(4) Atrial fibrillation with slow ventricular response


Current Visit: No   Status: Acute   Code(s): I48.91 - UNSPECIFIED ATRIAL 

FIBRILLATION   SNOMED Code(s): 75326532


   





(5) Dehydration


Current Visit: No   Status: Acute   Code(s): E86.0 - DEHYDRATION   SNOMED 

Code(s): 15467093


   





(6) Dyspnea


Current Visit: No   Status: Acute   Code(s): R06.00 - DYSPNEA, UNSPECIFIED   

SNOMED Code(s): 741637897


   





(7) HTN (hypertension)


Current Visit: No   Status: Acute   Code(s): I10 - ESSENTIAL (PRIMARY) 

HYPERTENSION   SNOMED Code(s): 25992231


   


Plan: 


Patient will be admitted to the hospital on IV antibiotics loss of cardiology 

see patient regarding pacemaker interrogation and evaluation of elevated 

troponin patient is comfortable without any distress at this time we'll continue

her home medications will also ask  to start discharge planning 

and we should consult physical therapy due to advance age.

## 2021-10-05 LAB
ANION GAP SERPL CALC-SCNC: 7 MMOL/L
BUN SERPL-SCNC: 14 MG/DL (ref 7–17)
CALCIUM SPEC-MCNC: 9.2 MG/DL (ref 8.4–10.2)
CHLORIDE SERPL-SCNC: 103 MMOL/L (ref 98–107)
CO2 SERPL-SCNC: 26 MMOL/L (ref 22–30)
ERYTHROCYTE [DISTWIDTH] IN BLOOD BY AUTOMATED COUNT: 4.05 M/UL (ref 3.8–5.4)
ERYTHROCYTE [DISTWIDTH] IN BLOOD: 12.1 % (ref 11.5–15.5)
GLUCOSE SERPL-MCNC: 89 MG/DL (ref 74–99)
HCT VFR BLD AUTO: 37.7 % (ref 34–46)
HGB BLD-MCNC: 12.1 GM/DL (ref 11.4–16)
MCH RBC QN AUTO: 29.9 PG (ref 25–35)
MCHC RBC AUTO-ENTMCNC: 32.2 G/DL (ref 31–37)
MCV RBC AUTO: 92.9 FL (ref 80–100)
PLATELET # BLD AUTO: 162 K/UL (ref 150–450)
POTASSIUM SERPL-SCNC: 4.2 MMOL/L (ref 3.5–5.1)
SODIUM SERPL-SCNC: 136 MMOL/L (ref 137–145)
WBC # BLD AUTO: 4.6 K/UL (ref 3.8–10.6)

## 2021-10-05 RX ADMIN — IPRATROPIUM BROMIDE SCH MG: 0.5 SOLUTION RESPIRATORY (INHALATION) at 15:35

## 2021-10-05 RX ADMIN — PRAVASTATIN SODIUM SCH MG: 40 TABLET ORAL at 20:12

## 2021-10-05 RX ADMIN — PSYLLIUM HUSK SCH GM: 3.4 POWDER ORAL at 20:12

## 2021-10-05 RX ADMIN — CYANOCOBALAMIN TAB 500 MCG SCH MCG: 500 TAB at 09:07

## 2021-10-05 RX ADMIN — PSYLLIUM HUSK SCH: 3.4 POWDER ORAL at 09:09

## 2021-10-05 RX ADMIN — LEVOTHYROXINE SODIUM SCH MCG: 25 TABLET ORAL at 06:40

## 2021-10-05 RX ADMIN — I-VITE, TAB 1000-60-2MG (60/BT) SCH EACH: TAB at 09:07

## 2021-10-05 RX ADMIN — PSYLLIUM HUSK SCH GM: 3.4 POWDER ORAL at 09:06

## 2021-10-05 RX ADMIN — IPRATROPIUM BROMIDE SCH MG: 0.5 SOLUTION RESPIRATORY (INHALATION) at 19:24

## 2021-10-05 RX ADMIN — RIVAROXABAN SCH MG: 15 TABLET, FILM COATED ORAL at 20:12

## 2021-10-05 RX ADMIN — ACETAMINOPHEN PRN MG: 325 TABLET, FILM COATED ORAL at 18:07

## 2021-10-05 RX ADMIN — SPIRONOLACTONE SCH MG: 25 TABLET, FILM COATED ORAL at 09:07

## 2021-10-05 RX ADMIN — PANTOPRAZOLE SODIUM SCH MG: 40 TABLET, DELAYED RELEASE ORAL at 09:07

## 2021-10-05 RX ADMIN — LATANOPROST SCH DROPS: 50 SOLUTION OPHTHALMIC at 20:12

## 2021-10-05 RX ADMIN — I-VITE, TAB 1000-60-2MG (60/BT) SCH EACH: TAB at 20:12

## 2021-10-05 RX ADMIN — FUROSEMIDE SCH MG: 20 TABLET ORAL at 09:07

## 2021-10-05 RX ADMIN — IPRATROPIUM BROMIDE SCH MG: 0.5 SOLUTION RESPIRATORY (INHALATION) at 10:51

## 2021-10-05 RX ADMIN — DONEPEZIL HYDROCHLORIDE SCH MG: 10 TABLET ORAL at 09:07

## 2021-10-05 RX ADMIN — IPRATROPIUM BROMIDE SCH MG: 0.5 SOLUTION RESPIRATORY (INHALATION) at 07:28

## 2021-10-05 RX ADMIN — PSYLLIUM HUSK SCH: 3.4 POWDER ORAL at 17:13

## 2021-10-05 NOTE — P.PN
Subjective


Progress Note Date: 10/05/21





Patient seen today doing better.  She denies any shortness of breath or chest 

pain and she states she was up with a walker without any falls denies any fever.





Objective





- Vital Signs


Vital signs: 


                                   Vital Signs











Temp  97.8 F   10/05/21 20:00


 


Pulse  62   10/05/21 20:00


 


Resp  18   10/05/21 20:00


 


BP  125/70   10/05/21 20:00


 


Pulse Ox  99   10/05/21 20:00








                                 Intake & Output











 10/05/21 10/05/21 10/06/21





 06:59 18:59 06:59


 


Intake Total  766 


 


Output Total 072125 700 650


 


Balance -201770 66 -650


 


Weight 83 kg  


 


Intake:   


 


  Intake, IV Titration  50 





  Amount   


 


    cefTRIAXone 1 gm In  50 





    Sodium Chloride 0.9% 50   





    ml @ 100 mls/hr IVPB   





    Q24HR [facility] Rx#:[id number]   


 


  Oral  716 


 


Output:   


 


  Urine 1270 700 650


 


  Post Void Residual 200500  


 


Other:   


 


  # Voids 3  1














- Exam





GENERAL: This is a -89 year-old   in no apparent distress at the time 

of examination. Pleasant and cooperative.


HEENT: Head is atraumatic, normocephalic. Pupils are equal, round, and reactive 

to light. Sclerae anicteric. Conjunctivae are clear. Mucus membranes of the 

mouth are moist. Neck is supple. 


RESPIRATORY: Clear to auscultation. No wheezes, rales, or rhonchi.  No use of 

accessory muscles.  Patient maintaining oxygen saturation greater than 92%. No 

chest wall tenderness is noted on palpation or with deep breathing.


CARDIOVASCULAR: Irregular rate and rhythm. S1 and S2 noted.  No JVD noted.


GASTROINTESTINAL: No distention noted.  Abdomen soft and round.  Normal active 

bowel sounds auscultated x 4 quadrants.  No pain or tenderness noted upon 

palpation.


INTEGUMENTARY: No cyanosis. No jaundice. No rashes noted. No cellulitis noted.


EXTREMITIES: 2+ peripheral pulses. No evidence of peripheral edema. No calf 

tenderness noted.


NEUROLOGIC:  Cranial nerves II-XII intact. Moving all extremities X 4.


PSYCHIATRIC: Awake, alert, and oriented X 3. Appropriate affect. 








- Labs


CBC & Chem 7: 


                                 10/05/21 07:41





                                 10/05/21 07:41


Labs: 


                  Abnormal Lab Results - Last 24 Hours (Table)











  10/05/21 Range/Units





  07:41 


 


Sodium  136 L  (137-145)  mmol/L








                      Microbiology - Last 24 Hours (Table)











 10/03/21 04:51 Urine Culture - Final





 Urine,Voided    Escherichia coli














Assessment and Plan


(1) Sepsis due to gram-negative urinary tract infection


Current Visit: Yes   Status: Acute   Code(s): A41.50 - GRAM-NEGATIVE SEPSIS, 

UNSPECIFIED; N39.0 - URINARY TRACT INFECTION, SITE NOT SPECIFIED   SNOMED 

Code(s): 571243082


   





(2) Elevated troponin


Current Visit: Yes   Status: Acute   Code(s): R77.8 - OTHER SPECIFIED 

ABNORMALITIES OF PLASMA PROTEINS   SNOMED Code(s): 564713431


   





(3) Weakness


Current Visit: Yes   Status: Acute   Code(s): R53.1 - WEAKNESS   SNOMED Code(s):

88083074


   





(4) Atrial fibrillation with slow ventricular response


Current Visit: No   Status: Acute   Code(s): I48.91 - UNSPECIFIED ATRIAL F

IBRILLATION   SNOMED Code(s): 37625463


   





(5) Dehydration


Current Visit: No   Status: Acute   Code(s): E86.0 - DEHYDRATION   SNOMED 

Code(s): 92163695


   





(6) Dyspnea


Current Visit: No   Status: Acute   Code(s): R06.00 - DYSPNEA, UNSPECIFIED   

SNOMED Code(s): 646693085


   





(7) HTN (hypertension)


Current Visit: No   Status: Acute   Code(s): I10 - ESSENTIAL (PRIMARY) 

HYPERTENSION   SNOMED Code(s): 61491805


   





(8) Leukocytosis


Current Visit: Yes   Status: Acute   Code(s): D72.829 - ELEVATED WHITE BLOOD 

CELL COUNT, UNSPECIFIED   SNOMED Code(s): 122067109


   


Plan: 


Plan is to continue IV hydration, antibiotics and physical therapy. Pt wishes to

go home if stable..

## 2021-10-05 NOTE — P.PN
Subjective


Progress Note Date: 10/05/21





HISTORY OF PRESENT ILLNESS: 





Patient is a pleasant 87 yo female with history of mild dementia, chronic Afib 

on Xarelto, HLD, HTN, chronic diastolic heart failure, pulmonary hypertension, 

tricuspid regurgitation, moderate to severe mitral regurgitation, sick sinus 

syndrome s/p His bundle permanent pacemaker in April 2021, former smoker. She 

follows in the office with Dr Flaherty. We are consulted for elevated troponin. 

Patient presented to the emergency department after she became weak and could 

not ambulate from her commode.  Patient states she is going to use the bathroom 

and did not have enough strength to stand up from the commode.  She states been 

having increased bilateral lower extremity weakness.  Patient denies any chest 

pain, shortness of breath, lightheadedness, dizziness, syncope, palpitations, 

symptoms of orthopnea or PND.  She states she did have a mechanical fall at the 

end of August requiring stitches of her right shin. 





DIAGNOSTICS


EKG- Paced rhythm, HR 72, appears to be his bundle paced and also v-paced 


Echocardiogram- 3/23/2021 in the office which showed EF 55% grade 3 diastolic 

dysfunction, PFO, moderate to severe MR, moderate TR, RVSP 63. 


Chest xray-mild pulmonary congestion and cardiomegaly.  Pulmonary vascularity 

increased compared to old exam.


Laboratory data, WBC 19.1, sodium 131, potassium 4.6, BUN 15, serum creatinine 

1.0, magnesium 1.6, troponin 0.043, CRP 2.4, proBNP 2540, COVID-19 PCR negative


Current daily cardiac medications Spironolactone 25 mg daily, Xarelto 50 mg 

nightly, pravastatin, lisinopril, Lasix 20 mg daily





10/5/2021


Patient examined this morning at the bedside.  Patient denies chest pain or 

pressure.  She denies shortness of breath.  Vital signs are stable.





PHYSICAL EXAM: 


VITAL SIGNS: Reviewed.


GENERAL: Well-developed in no acute distress. 


NECK: Supple. No JVD or thyromegaly


LUNGS: Respirations even and unlabored. Lungs essentially clear to auscultation 

bilaterally.


HEART: Regular rate and rhythm.  S1 and S2 heard.  Systolic murmur noted.


EXTREMITIES: Normal range of motion.  No clubbing or cyanosis.  Peripheral 

pulses intact.  No lower extremity edema





ASSESSMENT: 


Elevated troponin, 0.04x 3, not indicative of acute coronary syndrome


Chronic persistent atrial fibrillation on Xarelto 


Symptomatic bradycardia, sick sinus syndrome s/p His bundle PPM placement 

4/23/21


Acute on chronic diastolic heart failure, appears euvolemic s/p Lasix


Moderate to severe mitral regurgitation


Essential hypertension


Mild dementia


Pulmonary hypertension 





PLAN: 


Patient's pacemaker was interrogated yesterday. No adjustments to be made by Dr. Atkinson


Continue current cardiac medications


Patient is stable from a cardiac perspective


We will sign off.  Please reconsult if needed.








Nurse practitioner note has been reviewed by physician. Signing provider agrees 

with the documented findings, assessment, and plan of care. 








Objective





- Vital Signs


Vital signs: 


                                   Vital Signs











Temp  98 F   10/05/21 12:00


 


Pulse  59 L  10/05/21 12:00


 


Resp  20   10/05/21 12:00


 


BP  150/85   10/05/21 12:00


 


Pulse Ox  100   10/05/21 12:00








                                 Intake & Output











 10/04/21 10/05/21 10/05/21





 18:59 06:59 18:59


 


Intake Total 720  358


 


Output Total 300 944350 400


 


Balance 420 -984844 -42


 


Weight  83 kg 


 


Intake:   


 


  Oral 720  358


 


Output:   


 


  Urine 300 1270 400


 


  Post Void Residual  [id number] 


 


Other:   


 


  Voiding Method Bedside Commode  





 Indwelling Catheter  


 


  # Voids  3 














- Labs


CBC & Chem 7: 


                                 10/05/21 07:41





                                 10/05/21 07:41


Labs: 


                  Abnormal Lab Results - Last 24 Hours (Table)











  10/05/21 Range/Units





  07:41 


 


Sodium  136 L  (137-145)  mmol/L








                      Microbiology - Last 24 Hours (Table)











 10/03/21 04:51 Urine Culture - Final





 Urine,Voided    Escherichia coli

## 2021-10-06 VITALS — RESPIRATION RATE: 18 BRPM

## 2021-10-06 RX ADMIN — IPRATROPIUM BROMIDE SCH MG: 0.5 SOLUTION RESPIRATORY (INHALATION) at 12:05

## 2021-10-06 RX ADMIN — IPRATROPIUM BROMIDE SCH MG: 0.5 SOLUTION RESPIRATORY (INHALATION) at 16:53

## 2021-10-06 RX ADMIN — IPRATROPIUM BROMIDE SCH MG: 0.5 SOLUTION RESPIRATORY (INHALATION) at 20:29

## 2021-10-06 RX ADMIN — PSYLLIUM HUSK SCH GM: 3.4 POWDER ORAL at 20:35

## 2021-10-06 RX ADMIN — LEVOTHYROXINE SODIUM SCH MCG: 25 TABLET ORAL at 06:49

## 2021-10-06 RX ADMIN — CYANOCOBALAMIN TAB 500 MCG SCH MCG: 500 TAB at 08:23

## 2021-10-06 RX ADMIN — I-VITE, TAB 1000-60-2MG (60/BT) SCH EACH: TAB at 21:03

## 2021-10-06 RX ADMIN — PSYLLIUM HUSK SCH: 3.4 POWDER ORAL at 16:39

## 2021-10-06 RX ADMIN — LATANOPROST SCH DROPS: 50 SOLUTION OPHTHALMIC at 20:34

## 2021-10-06 RX ADMIN — DONEPEZIL HYDROCHLORIDE SCH MG: 10 TABLET ORAL at 08:23

## 2021-10-06 RX ADMIN — PRAVASTATIN SODIUM SCH MG: 40 TABLET ORAL at 20:34

## 2021-10-06 RX ADMIN — FUROSEMIDE SCH MG: 20 TABLET ORAL at 08:23

## 2021-10-06 RX ADMIN — SPIRONOLACTONE SCH MG: 25 TABLET, FILM COATED ORAL at 08:23

## 2021-10-06 RX ADMIN — I-VITE, TAB 1000-60-2MG (60/BT) SCH EACH: TAB at 08:23

## 2021-10-06 RX ADMIN — PANTOPRAZOLE SODIUM SCH MG: 40 TABLET, DELAYED RELEASE ORAL at 08:23

## 2021-10-06 RX ADMIN — IPRATROPIUM BROMIDE SCH MG: 0.5 SOLUTION RESPIRATORY (INHALATION) at 08:46

## 2021-10-06 RX ADMIN — PSYLLIUM HUSK SCH: 3.4 POWDER ORAL at 08:21

## 2021-10-06 RX ADMIN — RIVAROXABAN SCH MG: 15 TABLET, FILM COATED ORAL at 20:34

## 2021-10-06 NOTE — P.PN
Subjective


Progress Note Date: 10/06/21





Reyna a better day she is up at the bedside ambulating with the nurse she is  

using bedside commode  and her appetite is better





Objective





- Vital Signs


Vital signs: 


                                   Vital Signs











Temp  97.8 F   10/06/21 11:40


 


Pulse  70   10/06/21 12:16


 


Resp  18   10/06/21 13:02


 


BP  137/80   10/06/21 11:40


 


Pulse Ox  97   10/06/21 11:40








                                 Intake & Output











 10/05/21 10/06/21 10/06/21





 18:59 06:59 18:59


 


Intake Total 766  240


 


Output Total 700 1750 400


 


Balance 66 -1750 -160


 


Weight  81 kg 


 


Intake:   


 


  Intake, IV Titration 50  





  Amount   


 


    cefTRIAXone 1 gm In 50  





    Sodium Chloride 0.9% 50   





    ml @ 100 mls/hr IVPB   





    Q24HR [facility] Rx#:[id number]   


 


  Oral 716  240


 


Output:   


 


  Urine 700 1750 400


 


Other:   


 


  Voiding Method  Bedside Commode 


 


  # Voids  1 














- Exam





GENERAL: This is a -89 year-old   in no apparent distress at the time 

of examination. Pleasant and cooperative.


HEENT: Head is atraumatic, normocephalic. Pupils are equal, round, and reactive 

to light. Sclerae anicteric. Conjunctivae are clear. Mucus membranes of the 

mouth are moist. Neck is supple. 


RESPIRATORY: Clear to auscultation. No wheezes, rales, or rhonchi.  No use of 

accessory muscles.  Patient maintaining oxygen saturation greater than 92%. No 

chest wall tenderness is noted on palpation or with deep breathing.


CARDIOVASCULAR: Irregular rate and rhythm. S1 and S2 noted.  No JVD noted.


GASTROINTESTINAL: No distention noted.  Abdomen soft and round.  Normal active 

bowel sounds auscultated x 4 quadrants.  No pain or tenderness noted upon 

palpation.


INTEGUMENTARY: No cyanosis. No jaundice. No rashes noted. No cellulitis noted.


EXTREMITIES: 2+ peripheral pulses. No evidence of peripheral edema. No calf 

tenderness noted.


NEUROLOGIC:  Cranial nerves II-XII intact. Moving all extremities X 4.


PSYCHIATRIC: Awake, alert, and oriented X 3. Appropriate affect. 








- Labs


CBC & Chem 7: 


                                 10/05/21 07:41





                                 10/05/21 07:41


Labs: 


                      Microbiology - Last 24 Hours (Table)











 10/03/21 04:51 Urine Culture - Final





 Urine,Voided    Escherichia coli














Assessment and Plan


(1) Sepsis due to gram-negative urinary tract infection


Current Visit: Yes   Status: Acute   Code(s): A41.50 - GRAM-NEGATIVE SEPSIS, 

UNSPECIFIED; N39.0 - URINARY TRACT INFECTION, SITE NOT SPECIFIED   SNOMED 

Code(s): 243535425


   





(2) Elevated troponin


Current Visit: Yes   Status: Acute   Code(s): R77.8 - OTHER SPECIFIED 

ABNORMALITIES OF PLASMA PROTEINS   SNOMED Code(s): 170810693


   





(3) Weakness


Current Visit: Yes   Status: Acute   Code(s): R53.1 - WEAKNESS   SNOMED Code(s):

47908602


   





(4) Atrial fibrillation with slow ventricular response


Current Visit: No   Status: Acute   Code(s): I48.91 - UNSPECIFIED ATRIAL 

FIBRILLATION   SNOMED Code(s): 48363072


   





(5) Dehydration


Current Visit: No   Status: Acute   Code(s): E86.0 - DEHYDRATION   SNOMED 

Code(s): 33478433


   





(6) Dyspnea


Current Visit: No   Status: Acute   Code(s): R06.00 - DYSPNEA, UNSPECIFIED   

SNOMED Code(s): 513810980


   





(7) HTN (hypertension)


Current Visit: No   Status: Acute   Code(s): I10 - ESSENTIAL (PRIMARY) 

HYPERTENSION   SNOMED Code(s): 20412697


   





(8) Leukocytosis


Current Visit: Yes   Status: Acute   Code(s): D72.829 - ELEVATED WHITE BLOOD 

CELL COUNT, UNSPECIFIED   SNOMED Code(s): 078995635


   


Plan: 





Continue IV antibiotics continue hydration continue physical therapy patient 

would like to return home with help from her family.

## 2021-10-07 VITALS — HEART RATE: 60 BPM

## 2021-10-07 VITALS — TEMPERATURE: 98 F | DIASTOLIC BLOOD PRESSURE: 70 MMHG | SYSTOLIC BLOOD PRESSURE: 156 MMHG

## 2021-10-07 LAB — GLUCOSE BLD-MCNC: 97 MG/DL (ref 75–99)

## 2021-10-07 RX ADMIN — I-VITE, TAB 1000-60-2MG (60/BT) SCH EACH: TAB at 08:36

## 2021-10-07 RX ADMIN — PSYLLIUM HUSK SCH GM: 3.4 POWDER ORAL at 08:35

## 2021-10-07 RX ADMIN — IPRATROPIUM BROMIDE SCH MG: 0.5 SOLUTION RESPIRATORY (INHALATION) at 11:54

## 2021-10-07 RX ADMIN — DONEPEZIL HYDROCHLORIDE SCH MG: 10 TABLET ORAL at 08:35

## 2021-10-07 RX ADMIN — LEVOTHYROXINE SODIUM SCH MCG: 25 TABLET ORAL at 06:33

## 2021-10-07 RX ADMIN — PANTOPRAZOLE SODIUM SCH MG: 40 TABLET, DELAYED RELEASE ORAL at 08:36

## 2021-10-07 RX ADMIN — FUROSEMIDE SCH MG: 20 TABLET ORAL at 08:35

## 2021-10-07 RX ADMIN — SPIRONOLACTONE SCH MG: 25 TABLET, FILM COATED ORAL at 08:35

## 2021-10-07 RX ADMIN — IPRATROPIUM BROMIDE SCH MG: 0.5 SOLUTION RESPIRATORY (INHALATION) at 08:21

## 2021-10-07 RX ADMIN — CYANOCOBALAMIN TAB 500 MCG SCH MCG: 500 TAB at 08:36

## 2021-10-07 NOTE — P.DS
Providers


Date of admission: 


10/03/21 07:56





Expected date of discharge: 10/07/21


Attending physician: 


Kurt Patel





Primary care physician: 


Kurt Patel








- Discharge Diagnosis(es)


(1) Sepsis due to gram-negative urinary tract infection


Status: Acute   





(2) Elevated troponin


Status: Acute   





(3) Weakness


Status: Acute   





(4) Atrial fibrillation with slow ventricular response


Status: Acute   





(5) Dehydration


Status: Acute   





(6) Dyspnea


Status: Acute   





(7) HTN (hypertension)


Status: Acute   





(8) Leukocytosis


Status: Acute   


Hospital Course: 





Patient is pleasant 89-year-old white was admitted for urinary tract sepsis. She

was positive for E. coli which left her very weak and unable to ambulate.  

Physical therapy was consulted and patient was placed on IV antibiotics with 

hydration doing well and she was cleared by physical therapy to return to home 

in they did not recommend inpatient rehabilitation.


Patient Condition at Discharge: Fair





Plan - Discharge Summary


Discharge Rx Participant: Yes


New Discharge Prescriptions: 


New


   Ciprofloxacin HCl [Cipro] 250 mg PO BID 7 Days #14 tab





Continue


   LORazepam [Ativan] 0.5 mg PO HS PRN


     PRN Reason: rls


   rOPINIRole HCL [Requip] 1 mg PO HS PRN


     PRN Reason: RLS


   Rivaroxaban [Xarelto] 15 mg PO HS


   Pravastatin Sodium [Pravachol] 40 mg PO HS


   Omeprazole [PriLOSEC] 20 mg PO DAILY


   Tiotropium 18 Mcg/Puff [Spiriva] 2 cap INHALATION RT-DAILY


   Latanoprost Ophth [Xalatan 0.005%] 1 drop BOTH EYES HS


   Spironolactone [Aldactone] 25 mg PO DAILY


   Levothyroxine Sodium [Synthroid] 25 mcg PO DAILY


   Donepezil HCl [Aricept] 10 mg PO DAILY


   Cyanocobalamin (Vitamin B-12) [Vitamin B-12] 1,000 mcg PO DAILY


   Vit C/E/Zn/Coppr/Lutein/Zeaxan [Preservision Areds 2 Softgel] 1 cap PO BID


   Furosemide [Lasix] 20 mg PO DAILY


   Lisinopril [Prinivil] 10 mg PO AS DIRECTED@2100


   Psyllium Husk [Metamucil] 0.4 gm PO TID


Discharge Medication List





LORazepam [Ativan] 0.5 mg PO HS PRN 08/02/15 [History]


Omeprazole [PriLOSEC] 20 mg PO DAILY 08/02/15 [History]


Pravastatin Sodium [Pravachol] 40 mg PO HS 08/02/15 [History]


Rivaroxaban [Xarelto] 15 mg PO HS 08/02/15 [History]


Tiotropium 18 Mcg/Puff [Spiriva] 2 cap INHALATION RT-DAILY 08/02/15 [History]


rOPINIRole HCL [Requip] 1 mg PO HS PRN 08/02/15 [History]


Cyanocobalamin (Vitamin B-12) [Vitamin B-12] 1,000 mcg PO DAILY 09/11/19 

[History]


Donepezil HCl [Aricept] 10 mg PO DAILY 09/11/19 [History]


Latanoprost Ophth [Xalatan 0.005%] 1 drop BOTH EYES HS 09/11/19 [History]


Levothyroxine Sodium [Synthroid] 25 mcg PO DAILY 09/11/19 [History]


Spironolactone [Aldactone] 25 mg PO DAILY 09/11/19 [History]


Vit C/E/Zn/Coppr/Lutein/Zeaxan [Preservision Areds 2 Softgel] 1 cap PO BID 

09/11/19 [History]


Furosemide [Lasix] 20 mg PO DAILY 04/21/21 [History]


Lisinopril [Prinivil] 10 mg PO AS DIRECTED@2100 10/03/21 [History]


Psyllium Husk [Metamucil] 0.4 gm PO TID 10/03/21 [History]


Ciprofloxacin HCl [Cipro] 250 mg PO BID 7 Days #14 tab 10/07/21 [Rx]








Follow up Appointment(s)/Referral(s): 


Sadiq Flaherty DO [STAFF PHYSICIAN] - 10/21/21 2:30 pm (Thursday)


Kurt Patel DO [Primary Care Provider] - 10/11/21 11:30 am (Monday WITH ALEXA NP)


Discharge Disposition: HOME WITH HOME HEALTH SERVICES

## 2021-10-30 ENCOUNTER — HOSPITAL ENCOUNTER (OUTPATIENT)
Dept: HOSPITAL 47 - EC | Age: 86
Setting detail: OBSERVATION
LOS: 2 days | Discharge: NURSING, REHAB OR LONG-TERM CARE | End: 2021-11-01
Attending: FAMILY MEDICINE | Admitting: FAMILY MEDICINE
Payer: MEDICARE

## 2021-10-30 DIAGNOSIS — Z79.899: ICD-10-CM

## 2021-10-30 DIAGNOSIS — I11.0: ICD-10-CM

## 2021-10-30 DIAGNOSIS — Z79.01: ICD-10-CM

## 2021-10-30 DIAGNOSIS — Z98.42: ICD-10-CM

## 2021-10-30 DIAGNOSIS — Z87.891: ICD-10-CM

## 2021-10-30 DIAGNOSIS — Z87.19: ICD-10-CM

## 2021-10-30 DIAGNOSIS — Z87.81: ICD-10-CM

## 2021-10-30 DIAGNOSIS — I50.9: ICD-10-CM

## 2021-10-30 DIAGNOSIS — I48.20: ICD-10-CM

## 2021-10-30 DIAGNOSIS — G25.81: ICD-10-CM

## 2021-10-30 DIAGNOSIS — Z96.1: ICD-10-CM

## 2021-10-30 DIAGNOSIS — Z20.822: ICD-10-CM

## 2021-10-30 DIAGNOSIS — I82.B12: Primary | ICD-10-CM

## 2021-10-30 DIAGNOSIS — Z90.49: ICD-10-CM

## 2021-10-30 DIAGNOSIS — Z82.49: ICD-10-CM

## 2021-10-30 DIAGNOSIS — Z95.0: ICD-10-CM

## 2021-10-30 DIAGNOSIS — J44.9: ICD-10-CM

## 2021-10-30 DIAGNOSIS — Z88.0: ICD-10-CM

## 2021-10-30 DIAGNOSIS — Z86.718: ICD-10-CM

## 2021-10-30 DIAGNOSIS — Z98.41: ICD-10-CM

## 2021-10-30 DIAGNOSIS — Z90.710: ICD-10-CM

## 2021-10-30 DIAGNOSIS — F41.9: ICD-10-CM

## 2021-10-30 DIAGNOSIS — Z80.49: ICD-10-CM

## 2021-10-30 DIAGNOSIS — K21.9: ICD-10-CM

## 2021-10-30 DIAGNOSIS — E78.5: ICD-10-CM

## 2021-10-30 DIAGNOSIS — E66.9: ICD-10-CM

## 2021-10-30 DIAGNOSIS — Z79.890: ICD-10-CM

## 2021-10-30 LAB
ALBUMIN SERPL-MCNC: 3.9 G/DL (ref 3.5–5)
ALP SERPL-CCNC: 122 U/L (ref 38–126)
ALT SERPL-CCNC: 11 U/L (ref 4–34)
ANION GAP SERPL CALC-SCNC: 10 MMOL/L
APTT BLD: 29.7 SEC (ref 22–30)
AST SERPL-CCNC: 20 U/L (ref 14–36)
BASOPHILS # BLD AUTO: 0.1 K/UL (ref 0–0.2)
BASOPHILS NFR BLD AUTO: 1 %
BUN SERPL-SCNC: 14 MG/DL (ref 7–17)
CALCIUM SPEC-MCNC: 9.4 MG/DL (ref 8.4–10.2)
CHLORIDE SERPL-SCNC: 99 MMOL/L (ref 98–107)
CO2 SERPL-SCNC: 24 MMOL/L (ref 22–30)
EOSINOPHIL # BLD AUTO: 0.1 K/UL (ref 0–0.7)
EOSINOPHIL NFR BLD AUTO: 1 %
ERYTHROCYTE [DISTWIDTH] IN BLOOD BY AUTOMATED COUNT: 4.77 M/UL (ref 3.8–5.4)
ERYTHROCYTE [DISTWIDTH] IN BLOOD: 12.6 % (ref 11.5–15.5)
GLUCOSE SERPL-MCNC: 137 MG/DL (ref 74–99)
HCT VFR BLD AUTO: 43.3 % (ref 34–46)
HGB BLD-MCNC: 14 GM/DL (ref 11.4–16)
INR PPP: 1.3 (ref ?–1.2)
LYMPHOCYTES # SPEC AUTO: 0.8 K/UL (ref 1–4.8)
LYMPHOCYTES NFR SPEC AUTO: 9 %
MAGNESIUM SPEC-SCNC: 1.7 MG/DL (ref 1.6–2.3)
MCH RBC QN AUTO: 29.3 PG (ref 25–35)
MCHC RBC AUTO-ENTMCNC: 32.2 G/DL (ref 31–37)
MCV RBC AUTO: 90.9 FL (ref 80–100)
MONOCYTES # BLD AUTO: 0.5 K/UL (ref 0–1)
MONOCYTES NFR BLD AUTO: 5 %
NEUTROPHILS # BLD AUTO: 7.8 K/UL (ref 1.3–7.7)
NEUTROPHILS NFR BLD AUTO: 84 %
PLATELET # BLD AUTO: 193 K/UL (ref 150–450)
POTASSIUM SERPL-SCNC: 4.3 MMOL/L (ref 3.5–5.1)
PROT SERPL-MCNC: 6.9 G/DL (ref 6.3–8.2)
PT BLD: 13.3 SEC (ref 9–12)
SODIUM SERPL-SCNC: 133 MMOL/L (ref 137–145)
WBC # BLD AUTO: 9.3 K/UL (ref 3.8–10.6)

## 2021-10-30 PROCEDURE — 84100 ASSAY OF PHOSPHORUS: CPT

## 2021-10-30 PROCEDURE — 83880 ASSAY OF NATRIURETIC PEPTIDE: CPT

## 2021-10-30 PROCEDURE — 96375 TX/PRO/DX INJ NEW DRUG ADDON: CPT

## 2021-10-30 PROCEDURE — 96374 THER/PROPH/DIAG INJ IV PUSH: CPT

## 2021-10-30 PROCEDURE — 80053 COMPREHEN METABOLIC PANEL: CPT

## 2021-10-30 PROCEDURE — 93005 ELECTROCARDIOGRAM TRACING: CPT

## 2021-10-30 PROCEDURE — 80048 BASIC METABOLIC PNL TOTAL CA: CPT

## 2021-10-30 PROCEDURE — 93971 EXTREMITY STUDY: CPT

## 2021-10-30 PROCEDURE — 85610 PROTHROMBIN TIME: CPT

## 2021-10-30 PROCEDURE — 36415 COLL VENOUS BLD VENIPUNCTURE: CPT

## 2021-10-30 PROCEDURE — 97162 PT EVAL MOD COMPLEX 30 MIN: CPT

## 2021-10-30 PROCEDURE — 85379 FIBRIN DEGRADATION QUANT: CPT

## 2021-10-30 PROCEDURE — 71046 X-RAY EXAM CHEST 2 VIEWS: CPT

## 2021-10-30 PROCEDURE — 99285 EMERGENCY DEPT VISIT HI MDM: CPT

## 2021-10-30 PROCEDURE — 85025 COMPLETE CBC W/AUTO DIFF WBC: CPT

## 2021-10-30 PROCEDURE — 87635 SARS-COV-2 COVID-19 AMP PRB: CPT

## 2021-10-30 PROCEDURE — 97166 OT EVAL MOD COMPLEX 45 MIN: CPT

## 2021-10-30 PROCEDURE — 73060 X-RAY EXAM OF HUMERUS: CPT

## 2021-10-30 PROCEDURE — 73090 X-RAY EXAM OF FOREARM: CPT

## 2021-10-30 PROCEDURE — 85730 THROMBOPLASTIN TIME PARTIAL: CPT

## 2021-10-30 PROCEDURE — 83735 ASSAY OF MAGNESIUM: CPT

## 2021-10-30 PROCEDURE — 99284 EMERGENCY DEPT VISIT MOD MDM: CPT

## 2021-10-30 PROCEDURE — 94640 AIRWAY INHALATION TREATMENT: CPT

## 2021-10-30 RX ADMIN — I-VITE, TAB 1000-60-2MG (60/BT) SCH EACH: TAB at 20:46

## 2021-10-30 RX ADMIN — DONEPEZIL HYDROCHLORIDE SCH MG: 10 TABLET ORAL at 13:41

## 2021-10-30 RX ADMIN — FUROSEMIDE SCH MG: 20 TABLET ORAL at 13:41

## 2021-10-30 RX ADMIN — PRAVASTATIN SODIUM SCH MG: 40 TABLET ORAL at 20:46

## 2021-10-30 RX ADMIN — APIXABAN SCH MG: 5 TABLET, FILM COATED ORAL at 20:46

## 2021-10-30 RX ADMIN — LATANOPROST SCH DROPS: 50 SOLUTION OPHTHALMIC at 20:46

## 2021-10-30 NOTE — P.DS
Providers


Date of admission: 


10/30/21 02:08





Attending physician: 


Dangelo Tobias





Consults: 





                                        





10/30/21 02:10


Consult Physician Routine 


   Consulting Provider: Danna Weiss


   Consult Reason/Comments: DVT


   Do you want consulting provider notified?: Yes











Primary care physician: 


Kurt Patel





[facility] Course: 


please refer to history of present illness for further details


Patient Condition at Discharge: Fair





Plan - Discharge Summary


Discharge Rx Participant: Yes


New Discharge Prescriptions: 


New


   traMADol HCL 50 mg PO Q6H PRN #6 tablet


     PRN Reason: Pain


   Apixaban [Eliquis] 5 mg PO BID #60 tab





Discontinued


   Rivaroxaban [Xarelto] 15 mg PO HS





No Action


   LORazepam [Ativan] 0.5 mg PO HS PRN


     PRN Reason: rls


   rOPINIRole HCL [Requip] 1 mg PO HS PRN


     PRN Reason: RLS


   Pravastatin Sodium [Pravachol] 40 mg PO HS


   Omeprazole [PriLOSEC] 20 mg PO DAILY


   Tiotropium 18 Mcg/Puff [Spiriva] 2 cap INHALATION RT-DAILY


   Latanoprost Ophth [Xalatan 0.005%] 1 drop BOTH EYES HS


   Spironolactone [Aldactone] 25 mg PO DAILY


   Levothyroxine Sodium [Synthroid] 25 mcg PO DAILY


   Donepezil HCl [Aricept] 10 mg PO DAILY


   Cyanocobalamin (Vitamin B-12) [Vitamin B-12] 1,000 mcg PO DAILY


   Vit C/E/Zn/Coppr/Lutein/Zeaxan [Preservision Areds 2 Softgel] 1 cap PO BID


   Furosemide [Lasix] 20 mg PO DAILY


   Lisinopril [Prinivil] 10 mg PO HS


   Psyllium Husk [Metamucil] 0.4 gm PO TID


Discharge Medication List





LORazepam [Ativan] 0.5 mg PO HS PRN 08/02/15 [History]


Omeprazole [PriLOSEC] 20 mg PO DAILY 08/02/15 [History]


Pravastatin Sodium [Pravachol] 40 mg PO HS 08/02/15 [History]


Tiotropium 18 Mcg/Puff [Spiriva] 2 cap INHALATION RT-DAILY 08/02/15 [History]


rOPINIRole HCL [Requip] 1 mg PO HS PRN 08/02/15 [History]


Cyanocobalamin (Vitamin B-12) [Vitamin B-12] 1,000 mcg PO DAILY 09/11/19 

[History]


Donepezil HCl [Aricept] 10 mg PO DAILY 09/11/19 [History]


Latanoprost Ophth [Xalatan 0.005%] 1 drop BOTH EYES HS 09/11/19 [History]


Levothyroxine Sodium [Synthroid] 25 mcg PO DAILY 09/11/19 [History]


Spironolactone [Aldactone] 25 mg PO DAILY 09/11/19 [History]


Vit C/E/Zn/Coppr/Lutein/Zeaxan [Preservision Areds 2 Softgel] 1 cap PO BID 

09/11/19 [History]


Furosemide [Lasix] 20 mg PO DAILY 04/21/21 [History]


Lisinopril [Prinivil] 10 mg PO HS 10/03/21 [History]


Psyllium Husk [Metamucil] 0.4 gm PO TID 10/03/21 [History]


Apixaban [Eliquis] 5 mg PO BID #60 tab 10/30/21 [Rx]


traMADol HCL 50 mg PO Q6H PRN #6 tablet 10/30/21 [Rx]








Follow up Appointment(s)/Referral(s): 


Kurt Patel DO [Primary Care Provider] - 3 Days


Discharge Disposition: HOME WITH HOME HEALTH SERVICES

## 2021-10-30 NOTE — XR
EXAMINATION TYPE: XR forearm LT

 

DATE OF EXAM: 10/30/2021

 

COMPARISON: NONE

 

HISTORY: Pain and swelling

 

TECHNIQUE: 3 views

 

FINDINGS: There is soft tissue swelling of the forearm. There is deformity of the distal radius relat
ed to old healed fracture. 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 Elbow
 joint is intact.

 

IMPRESSION: Old distal radius fracture. No acute fracture. Soft tissue swelling.

## 2021-10-30 NOTE — ED
Extremity Problem HPI





- General


Chief complaint: Extremity Problem,Nontraumatic


Stated complaint: Left arm swelling


Time Seen by Provider: 10/30/21 00:13


Source: patient, EMS, RN notes reviewed, old records reviewed


Mode of arrival: EMS


Limitations: no limitations





- History of Present Illness


Initial comments: 





This is an 89-year-old female to the ER today.  She presents today for 

evaluation of left upper extremity pain and swelling.  Patient states she has no

prior history of similar complaint.  States symptoms began suddenly just prior 

to arrival.  Patient is able to move arm move her entire arm has no trauma has 

no other complaints.  No shortness of breath or chest pain


MD Complaint: extremity pain, extremity swelling


-: hour(s)


Location: left, upper extremity


History of Same: Yes


Radiation: proximal


Severity scale (1-10): 7


Quality: aching


Consistency: constant


Improves with: nothing


Worsens with: nothing


Associated Symptoms: denies other symptoms





- Related Data


                                Home Medications











 Medication  Instructions  Recorded  Confirmed


 


LORazepam [Ativan] 0.5 mg PO HS PRN 08/02/15 10/03/21


 


Omeprazole [PriLOSEC] 20 mg PO DAILY 08/02/15 10/03/21


 


Pravastatin Sodium [Pravachol] 40 mg PO HS 08/02/15 10/03/21


 


Rivaroxaban [Xarelto] 15 mg PO HS 08/02/15 10/03/21


 


Tiotropium 18 Mcg/Puff [Spiriva] 2 cap INHALATION RT-DAILY 08/02/15 10/03/21


 


rOPINIRole HCL [Requip] 1 mg PO HS PRN 08/02/15 10/03/21


 


Cyanocobalamin (Vitamin B-12) 1,000 mcg PO DAILY 09/11/19 10/03/21





[Vitamin B-12]   


 


Donepezil HCl [Aricept] 10 mg PO DAILY 09/11/19 10/03/21


 


Latanoprost Ophth [Xalatan 0.005%] 1 drop BOTH EYES HS 09/11/19 10/03/21


 


Levothyroxine Sodium [Synthroid] 25 mcg PO DAILY 09/11/19 10/03/21


 


Spironolactone [Aldactone] 25 mg PO DAILY 09/11/19 10/03/21


 


Vit C/E/Zn/Coppr/Lutein/Zeaxan 1 cap PO BID 09/11/19 10/03/21





[Preservision Areds 2 Softgel]   


 


Furosemide [Lasix] 20 mg PO DAILY 04/21/21 10/03/21


 


Lisinopril [Prinivil] 10 mg PO AS DIRECTED@2100 10/03/21 10/03/21


 


Psyllium Husk [Metamucil] 0.4 gm PO TID 10/03/21 10/03/21








                                  Previous Rx's











 Medication  Instructions  Recorded


 


Ciprofloxacin HCl [Cipro] 250 mg PO BID 7 Days #14 tab 10/07/21











                                    Allergies











Allergy/AdvReac Type Severity Reaction Status Date / Time


 


Penicillins Allergy  Rash/Hives Verified 10/03/21 09:04


 


sulfamethoxazole Allergy  Rash/Hives Verified 10/03/21 09:04





[From Bactrim]     


 


trimethoprim [From Bactrim] Allergy  Rash/Hives Verified 10/03/21 09:04














Review of Systems


ROS Statement: 


Those systems with pertinent positive or pertinent negative responses have been 

documented in the HPI.





ROS Other: All systems not noted in ROS Statement are negative.





Past Medical History


Past Medical History: Atrial Fibrillation, Heart Failure, COPD, GERD/Reflux, GI 

Bleed, Hyperlipidemia, Hypertension


Additional Past Medical History / Comment(s): restless leg syndrome


History of Any Multi-Drug Resistant Organisms: None Reported


Past Surgical History: Appendectomy, Cholecystectomy, Hysterectomy, Joint 

Replacement, Orthopedic Surgery


Additional Past Surgical History / Comment(s): cataracts removed, bilateral 

knees


Past Anesthesia/Blood Transfusion Reactions: No Reported Reaction


Type of Cardiac Device: Biventricular Pacemaker


Device Placement Date:: 2021


Past Psychological History: Anxiety


Smoking Status: Former smoker


Past Alcohol Use History: None Reported


Past Drug Use History: None Reported





- Past Family History


  ** Father


Family Medical History: Coronary Artery Disease (CAD)


Additional Family Medical History / Comment(s): rhemuatic fever,  at 59 of 

"bad heart"





  ** Mother


Family Medical History: Cancer


Additional Family Medical History / Comment(s): uterus





General Exam





- General Exam Comments


Initial Comments: 





Good pulses left upper extremity


Limitations: no limitations


General appearance: alert, in no apparent distress


Head exam: Present: atraumatic, normocephalic, normal inspection


Eye exam: Present: normal appearance, PERRL, EOMI.  Absent: scleral icterus, 

conjunctival injection, periorbital swelling


ENT exam: Present: normal exam, mucous membranes moist


Neck exam: Present: normal inspection.  Absent: tenderness, meningismus, 

lymphadenopathy


Respiratory exam: Present: normal lung sounds bilaterally.  Absent: respiratory 

distress, wheezes, rales, rhonchi, stridor


Cardiovascular Exam: Present: regular rate, normal rhythm, normal heart sounds. 

Absent: systolic murmur, diastolic murmur, rubs, gallop, clicks


GI/Abdominal exam: Present: soft, normal bowel sounds.  Absent: distended, 

tenderness, guarding, rebound, rigid


Extremities exam: Present: normal inspection, full ROM, normal capillary refill.

 Absent: tenderness, pedal edema, joint swelling, calf tenderness


Back exam: Present: normal inspection


Neurological exam: Present: alert, oriented X3, CN II-XII intact


Psychiatric exam: Present: normal affect, normal mood


Skin exam: Present: warm, dry, intact, normal color.  Absent: rash





Course


                                   Vital Signs











  10/30/21





  00:24


 


Temperature 97.8 F


 


Pulse Rate 62


 


Respiratory 17





Rate 


 


Blood Pressure 115/78


 


O2 Sat by Pulse 96





Oximetry 














- Reevaluation(s)


Reevaluation #1: 





10/30/21 02:16


Medical record is reviewed


Reevaluation #2: 





10/30/21 02:16


Patient is informed of results and questions are answered


Reevaluation #3: 





10/30/21 02:16


Patient will be admitted for anticoagulation





- Consultations


Consultation #1: 





Spoke with [facility] were agreed to admit this patient





Medical Decision Making





- Medical Decision Making





89 female to the emergency department with left upper Shorty pain and swelling 

patient does have positive DVT left subclavian, patient be admitted for 

anticoagulation and further evaluation and treatment





- Radiology Data


Radiology results: report reviewed (She left upper extremity ultrasound left 

upper trauma is positive for subclavian vein DVT), image reviewed





Disposition


Clinical Impression: 


 Subclavian vein thrombosis, left





Disposition: ADMITTED AS IP TO THIS [facility]


Condition: Fair


Is patient prescribed a controlled substance at d/c from ED?: No


Referrals: 


Kurt Patel DO [Primary Care Provider] - 1-2 days

## 2021-10-30 NOTE — US
EXAMINATION TYPE: US venous doppler duplex UE LT

 

DATE OF EXAM: 10/30/2021

 

COMPARISON: NONE

 

CLINICAL HISTORY: pain. Left arm swelling and discoloration.  Poor historian.  

 

SIDE PERFORMED: Left

 

 

 

Left Arm: Positive for DVT in medial subclavial vein.  IVC appear small in size.  Prominent rouleaux 
flow visualized in mid/lat subclavian vein and basilic vein. Basilic vein was difficult to compress d
ue to patient tolerance but wall to wall color flow was visualized.  

 

 

 

IMPRESSION: 

There is some limited deep vein thrombosis involving the subclavian vein. Thrombus appears acute.

## 2021-10-30 NOTE — XR
EXAMINATION TYPE: XR humerus LT

 

DATE OF EXAM: 10/30/2021

 

COMPARISON: NONE

 

HISTORY: Swelling and pain

 

TECHNIQUE: 2 views

 

FINDINGS: Shoulder joint and elbow joint appear intact. I see no fracture nor dislocation.

 

IMPRESSION: Negative left humerus exam.

## 2021-10-30 NOTE — XR
EXAMINATION TYPE: XR chest 2V

 

DATE OF EXAM: 10/30/2021

 

COMPARISON: 10/3/2021

 

HISTORY: Weakness

 

TECHNIQUE:

 

FINDINGS: Heart is enlarged. There is no heart failure. There is some coarsening of the lung markings
. There is left axillary pacemaker. There is no pleural effusion. Bony thorax is intact.

 

IMPRESSION: Cardiomegaly. Mild pulmonary fibrosis. There is clearing of the mild congestion compared 
to old exam. No heart failure.

## 2021-10-30 NOTE — P.HPIM
History of Present Illness


A 89-year-old present female came in with the left flexed with the pain and 

swelling found to have DVT in the left upper extremity evidence of fractures on 

imaging studies.  patient is already on anticoagulation with Xarelto which is 

for atrial fibrillation at 15 mg at nighttime.  Patient will be switched to 

Eliquis considering that patient and up having DVT in spite of present 

anticoagulation.    Patient does have a swelling because of this DVT for which 

we'll use compression socks and patient will be referred to hematology may need 

vascular surgery evaluation if compression will not help the swelling.  Patient 

was also comparing of pain for which patient will be given anti-inflammatory 

medication.





Patient did declined to go to [facility] patient lives by herself but the her 

granddaughter visits her often about 3 times a week and patient wanted to go 

home and can get physical therapy as an outpatient patient memory is very good





REVIEW OF SYSTEMS: 


CONSTITUTIONAL: No fever, no malaise, no fatigue. 


HEENT: No recent visual problems or hearing problems. Denied any sore throat. 


CARDIOVASCULAR: No chest pain, orthopnea, PND, no palpitations, no syncope. 


PULMONARY: No shortness of breath, no cough, no hemoptysis. 


GASTROINTESTINAL: No diarrhea, no nausea, no vomiting, no abdominal pain. 


NEUROLOGICAL: No headaches, no weakness, no numbness. 


HEMATOLOGICAL: Denies any bleeding or petechiae. 


GENITOURINARY: Denies any burning micturition, frequency, or urgency. 


MUSCULOSKELETAL/RHEUMATOLOGICAL: As mentioned in HPI ENDOCRINE: Denies any 

polyuria or polydipsia. 





The rest of the 14-point review of systems is negative.











PHYSICAL EXAMINATION: 





GENERAL: The patient is alert and oriented x3, not in any acute distress. Well 

developed, well nourished. 


HEENT: Pupils are round and equally reacting to light. EOMI. No scleral icterus.

No conjunctival pallor. Normocephalic, atraumatic. No pharyngeal erythema. No 

thyromegaly. 


CARDIOVASCULAR: S1 and S2 present. No murmurs, rubs, or gallops. 


PULMONARY: Chest is clear to auscultation, no wheezing or crackles. 


ABDOMEN: Soft, nontender, nondistended, normoactive bowel sounds. No palpable 

organomegaly. 


MUSCULOSKELETAL: No joint swelling or deformity.


EXTREMITIES: Edema of the left upper extremity 


NEUROLOGICAL: Gross neurological examination did not reveal any focal deficits. 


SKIN: No rashes. 





Assessment and plan


-DVT of the left upper extremity with swelling: Patient will be started on 

Eliquis as mentioned above patient may not need 10 mg twice a day of Eliquis for

week because patient is already on anticoagulation at home.


--History of atrial fibrillation wasn't a rate controlled next and-COPD


-Gases failure reflux disease


-Hyperlipidemia


-Hypertension


Patient will be discharged today with the above-mentioned plan








Past Medical History


Past Medical History: Atrial Fibrillation, Heart Failure, COPD, GERD/Reflux, GI 

Bleed, Hyperlipidemia, Hypertension


Additional Past Medical History / Comment(s): restless leg syndrome


History of Any Multi-Drug Resistant Organisms: None Reported


Past Surgical History: Appendectomy, Cholecystectomy, Hysterectomy, Joint 

Replacement, Orthopedic Surgery


Additional Past Surgical History / Comment(s): cataracts removed, bilateral 

knees


Past Anesthesia/Blood Transfusion Reactions: No Reported Reaction


Type of Cardiac Device: Biventricular Pacemaker


Device Placement Date:: 2021


Past Psychological History: Anxiety


Smoking Status: Former smoker


Past Alcohol Use History: None Reported


Past Drug Use History: None Reported





- Past Family History


  ** Father


Family Medical History: Coronary Artery Disease (CAD)


Additional Family Medical History / Comment(s): rhemuatic fever,  at 59 of 

"bad heart"





  ** Mother


Family Medical History: Cancer


Additional Family Medical History / Comment(s): uterus





Medications and Allergies


                                Home Medications











 Medication  Instructions  Recorded  Confirmed  Type


 


LORazepam [Ativan] 0.5 mg PO HS PRN 08/02/15 10/30/21 History


 


Omeprazole [PriLOSEC] 20 mg PO DAILY 08/02/15 10/30/21 History


 


Pravastatin Sodium [Pravachol] 40 mg PO HS 08/02/15 10/30/21 History


 


Tiotropium 18 Mcg/Puff [Spiriva] 2 cap INHALATION RT-DAILY 08/02/15 10/30/21 

History


 


rOPINIRole HCL [Requip] 1 mg PO HS PRN 08/02/15 10/30/21 History


 


Cyanocobalamin (Vitamin B-12) 1,000 mcg PO DAILY 09/11/19 10/30/21 History





[Vitamin B-12]    


 


Donepezil HCl [Aricept] 10 mg PO DAILY 09/11/19 10/30/21 History


 


Latanoprost Ophth [Xalatan 0.005%] 1 drop BOTH EYES HS 09/11/19 10/30/21 History


 


Levothyroxine Sodium [Synthroid] 25 mcg PO DAILY 09/11/19 10/30/21 History


 


Spironolactone [Aldactone] 25 mg PO DAILY 09/11/19 10/30/21 History


 


Vit C/E/Zn/Coppr/Lutein/Zeaxan 1 cap PO BID 09/11/19 10/30/21 History





[Preservision Areds 2 Softgel]    


 


Furosemide [Lasix] 20 mg PO DAILY 04/21/21 10/30/21 History


 


Lisinopril [Prinivil] 10 mg PO HS 10/03/21 10/30/21 History


 


Psyllium Husk [Metamucil] 0.4 gm PO TID 10/03/21 10/30/21 History


 


Apixaban [Eliquis] 5 mg PO BID #60 tab 10/30/21  Rx


 


traMADol HCL 50 mg PO Q6H PRN #6 tablet 10/30/21  Rx








                                    Allergies











Allergy/AdvReac Type Severity Reaction Status Date / Time


 


Penicillins Allergy  Rash/Hives Verified 10/30/21 09:08


 


sulfamethoxazole Allergy  Rash/Hives Verified 10/30/21 09:08





[From Bactrim]     


 


trimethoprim [From Bactrim] Allergy  Rash/Hives Verified 10/30/21 09:08














Physical Exam


Vitals: 


                                   Vital Signs











  Temp Pulse Pulse Resp BP BP Pulse Ox


 


 10/30/21 05:35  97.7 F   66  20   139/85  95


 


 10/30/21 03:19   60   17  140/80   96


 


 10/30/21 00:24  97.8 F  62   17  115/78   96








                                Intake and Output











 10/29/21 10/30/21 10/30/21





 22:59 06:59 14:59


 


Other:   


 


  Voiding Method  Toilet 





  Diaper 





  Incontinent 


 


  # Voids  1 


 


  Weight  72.575 kg 














Results


CBC & Chem 7: 


                                 10/30/21 03:08





                                 10/30/21 03:08


Labs: 


                  Abnormal Lab Results - Last 24 Hours (Table)











  10/30/21 10/30/21 10/30/21 Range/Units





  03:08 03:08 03:08 


 


Neutrophils #  7.8 H    (1.3-7.7)  k/uL


 


Lymphocytes #  0.8 L    (1.0-4.8)  k/uL


 


PT   13.3 H   (9.0-12.0)  sec


 


INR   1.3 H   (<1.2)  


 


D-Dimer   5.34 H   (<0.60)  mg/L FEU


 


Sodium    133 L  (137-145)  mmol/L


 


Glucose    137 H  (74-99)  mg/dL














Thrombosis Risk Factor Assmnt





- Choose All That Apply


Any of the Below Risk Factors Present?: Yes


Each Factor Represents 1 point: Obesity (BMI >25), Varicose veins


Other Risk Factors: Yes


Each Risk Factor Represents 3 Points: Age 75 years or older


Other congenital or acquired thrombophilia - If yes, enter type in comment: No


Thrombosis Risk Factor Assessment Total Risk Factor Score: 5


Thrombosis Risk Factor Assessment Level: High Risk

## 2021-10-30 NOTE — P.GSCN
History of Present Illness


Consult date: 10/30/21


History of present illness: 





Reyna is an 89-year-old female who came in to the hospital for increasing 

swelling of her left upper extremity.  She states this started relatively 

precipitously and in the past she has been on Xarelto for atrial fibrillation 

once daily.  She denies any chest pains, shortness of breath, fever, chills, 

nausea, vomiting or issues otherwise





She does have a pacemaker in her left





Review of Systems





14 point review of systems performed.  Pertinent positives and negatives per the

HPI





Past Medical History


Past Medical History: Atrial Fibrillation, Heart Failure, COPD, GERD/Reflux, GI 

Bleed, Hyperlipidemia, Hypertension


Additional Past Medical History / Comment(s): restless leg syndrome


History of Any Multi-Drug Resistant Organisms: None Reported


Past Surgical History: Appendectomy, Cholecystectomy, Hysterectomy, Joint 

Replacement, Orthopedic Surgery


Additional Past Surgical History / Comment(s): cataracts removed, bilateral 

knees


Past Anesthesia/Blood Transfusion Reactions: No Reported Reaction


Type of Cardiac Device: Biventricular Pacemaker


Device Placement Date:: 2021


Past Psychological History: Anxiety


Smoking Status: Former smoker


Past Alcohol Use History: None Reported


Past Drug Use History: None Reported





- Past Family History


  ** Father


Family Medical History: Coronary Artery Disease (CAD)


Additional Family Medical History / Comment(s): rhemuatic fever,  at 59 of 

"bad heart"





  ** Mother


Family Medical History: Cancer


Additional Family Medical History / Comment(s): uterus





Medications and Allergies


                                Home Medications











 Medication  Instructions  Recorded  Confirmed  Type


 


LORazepam [Ativan] 0.5 mg PO HS PRN 08/02/15 10/30/21 History


 


Omeprazole [PriLOSEC] 20 mg PO DAILY 08/02/15 10/30/21 History


 


Pravastatin Sodium [Pravachol] 40 mg PO HS 08/02/15 10/30/21 History


 


Tiotropium 18 Mcg/Puff [Spiriva] 2 cap INHALATION RT-DAILY 08/02/15 10/30/21 

History


 


rOPINIRole HCL [Requip] 1 mg PO HS PRN 08/02/15 10/30/21 History


 


Cyanocobalamin (Vitamin B-12) 1,000 mcg PO DAILY 09/11/19 10/30/21 History





[Vitamin B-12]    


 


Donepezil HCl [Aricept] 10 mg PO DAILY 09/11/19 10/30/21 History


 


Latanoprost Ophth [Xalatan 0.005%] 1 drop BOTH EYES HS 09/11/19 10/30/21 History


 


Levothyroxine Sodium [Synthroid] 25 mcg PO DAILY 09/11/19 10/30/21 History


 


Spironolactone [Aldactone] 25 mg PO DAILY 09/11/19 10/30/21 History


 


Vit C/E/Zn/Coppr/Lutein/Zeaxan 1 cap PO BID 09/11/19 10/30/21 History





[Preservision Areds 2 Softgel]    


 


Furosemide [Lasix] 20 mg PO DAILY 04/21/21 10/30/21 History


 


Lisinopril [Prinivil] 10 mg PO HS 10/03/21 10/30/21 History


 


Psyllium Husk [Metamucil] 0.4 gm PO TID 10/03/21 10/30/21 History


 


Apixaban [Eliquis] 5 mg PO BID #60 tab 10/30/21  Rx


 


traMADol HCL 50 mg PO Q6H PRN #6 tablet 10/30/21  Rx








                                    Allergies











Allergy/AdvReac Type Severity Reaction Status Date / Time


 


Penicillins Allergy  Rash/Hives Verified 10/30/21 09:08


 


sulfamethoxazole Allergy  Rash/Hives Verified 10/30/21 09:08





[From Bactrim]     


 


trimethoprim [From Bactrim] Allergy  Rash/Hives Verified 10/30/21 09:08














Surgical - Exam


                                   Vital Signs











Temp Pulse Resp BP Pulse Ox


 


 97.8 F   62   17   115/78   96 


 


 10/30/21 00:24  10/30/21 00:24  10/30/21 00:24  10/30/21 00:24  10/30/21 00:24














Gen  a pleasant cooperative female in no acute distress.  HEENT is normocephalic

atraumatic, extraocular motion intact.  Heart is irregularly irregular.  Lungs 

are clear bilaterally.  Abdomen is soft, obese and nontender.  The left upper 

extremity has moderate swelling.  Normal motor sensory intact.





Results





Ultrasound is reviewed.





- Labs





                                 10/30/21 03:08





                                 10/30/21 03:08


                  Abnormal Lab Results - Last 24 Hours (Table)











  10/30/21 10/30/21 10/30/21 Range/Units





  03:08 03:08 03:08 


 


Neutrophils #  7.8 H    (1.3-7.7)  k/uL


 


Lymphocytes #  0.8 L    (1.0-4.8)  k/uL


 


PT   13.3 H   (9.0-12.0)  sec


 


INR   1.3 H   (<1.2)  


 


APTT     (22.0-30.0)  sec


 


D-Dimer   5.34 H   (<0.60)  mg/L FEU


 


Sodium    133 L  (137-145)  mmol/L


 


Glucose    137 H  (74-99)  mg/dL














  10/30/21 Range/Units





  10:58 


 


Neutrophils #   (1.3-7.7)  k/uL


 


Lymphocytes #   (1.0-4.8)  k/uL


 


PT   (9.0-12.0)  sec


 


INR   (<1.2)  


 


APTT  155.2 H*  (22.0-30.0)  sec


 


D-Dimer   (<0.60)  mg/L FEU


 


Sodium   (137-145)  mmol/L


 


Glucose   (74-99)  mg/dL








                                 Diabetes panel











  10/30/21 Range/Units





  03:08 


 


Sodium  133 L  (137-145)  mmol/L


 


Potassium  4.3  (3.5-5.1)  mmol/L


 


Chloride  99  ()  mmol/L


 


Carbon Dioxide  24  (22-30)  mmol/L


 


BUN  14  (7-17)  mg/dL


 


Creatinine  0.93  (0.52-1.04)  mg/dL


 


Glucose  137 H  (74-99)  mg/dL


 


Calcium  9.4  (8.4-10.2)  mg/dL


 


AST  20  (14-36)  U/L


 


ALT  11  (4-34)  U/L


 


Alkaline Phosphatase  122  ()  U/L


 


Total Protein  6.9  (6.3-8.2)  g/dL


 


Albumin  3.9  (3.5-5.0)  g/dL








                                  Calcium panel











  10/30/21 Range/Units





  03:08 


 


Calcium  9.4  (8.4-10.2)  mg/dL


 


Phosphorus  4.2  (2.5-4.5)  mg/dL


 


Albumin  3.9  (3.5-5.0)  g/dL








                                 Pituitary panel











  10/30/21 Range/Units





  03:08 


 


Sodium  133 L  (137-145)  mmol/L


 


Potassium  4.3  (3.5-5.1)  mmol/L


 


Chloride  99  ()  mmol/L


 


Carbon Dioxide  24  (22-30)  mmol/L


 


BUN  14  (7-17)  mg/dL


 


Creatinine  0.93  (0.52-1.04)  mg/dL


 


Glucose  137 H  (74-99)  mg/dL


 


Calcium  9.4  (8.4-10.2)  mg/dL








                                  Adrenal panel











  10/30/21 Range/Units





  03:08 


 


Sodium  133 L  (137-145)  mmol/L


 


Potassium  4.3  (3.5-5.1)  mmol/L


 


Chloride  99  ()  mmol/L


 


Carbon Dioxide  24  (22-30)  mmol/L


 


BUN  14  (7-17)  mg/dL


 


Creatinine  0.93  (0.52-1.04)  mg/dL


 


Glucose  137 H  (74-99)  mg/dL


 


Calcium  9.4  (8.4-10.2)  mg/dL


 


Total Bilirubin  0.8  (0.2-1.3)  mg/dL


 


AST  20  (14-36)  U/L


 


ALT  11  (4-34)  U/L


 


Alkaline Phosphatase  122  ()  U/L


 


Total Protein  6.9  (6.3-8.2)  g/dL


 


Albumin  3.9  (3.5-5.0)  g/dL














Assessment and Plan


Assessment: 





Left subclavian DVT


Left upper extremity swelling secondary to #1


Pacemaker


Plan: 





Discussed with the patient and family that there is likely some degree of 

anatomic narrowing due to the pacemaker and wires which is likely the cause of 

fact of the DVT.  At this time support with oral anticoagulation, elevation and 

compression of the extremity.  No further planned intervention, typically unable

to perform any sort of ballooning or stenting of the venous system in this 

regard due to the pacemaker wires and risk of fracture.  There is no indication 

for acute thrombolytics of the clot itself given her arm maintains arterial 

flow, is motor sensory intact and has no evidence of phlegmasia at this time

## 2021-10-31 LAB
ALBUMIN SERPL-MCNC: 3.4 G/DL (ref 3.8–4.9)
ALBUMIN/GLOB SERPL: 1.5 G/DL (ref 1.6–3.17)
ALP SERPL-CCNC: 169 U/L (ref 41–126)
ALT SERPL-CCNC: 38 U/L (ref 8–44)
ANION GAP SERPL CALC-SCNC: 15.3 MMOL/L (ref 4–12)
AST SERPL-CCNC: 55 U/L (ref 13–35)
BASOPHILS # BLD AUTO: 0 K/UL (ref 0–0.2)
BASOPHILS NFR BLD AUTO: 1 %
BUN SERPL-SCNC: 10.6 MG/DL (ref 9–27)
BUN/CREAT SERPL: 12.31 RATIO (ref 12–20)
CALCIUM SPEC-MCNC: 8.8 MG/DL (ref 8.7–10.3)
CHLORIDE SERPL-SCNC: 102 MMOL/L (ref 96–109)
CO2 SERPL-SCNC: 16.2 MMOL/L (ref 21.6–31.8)
EOSINOPHIL # BLD AUTO: 0.1 K/UL (ref 0–0.7)
EOSINOPHIL NFR BLD AUTO: 2 %
ERYTHROCYTE [DISTWIDTH] IN BLOOD BY AUTOMATED COUNT: 4.31 M/UL (ref 3.8–5.4)
ERYTHROCYTE [DISTWIDTH] IN BLOOD: 13.2 % (ref 11.5–15.5)
GLOBULIN SER CALC-MCNC: 2.3 G/DL (ref 1.6–3.3)
GLUCOSE SERPL-MCNC: 101 MG/DL (ref 70–110)
HCT VFR BLD AUTO: 40.5 % (ref 34–46)
HGB BLD-MCNC: 13 GM/DL (ref 11.4–16)
LYMPHOCYTES # SPEC AUTO: 0.5 K/UL (ref 1–4.8)
LYMPHOCYTES NFR SPEC AUTO: 9 %
MCH RBC QN AUTO: 30.2 PG (ref 25–35)
MCHC RBC AUTO-ENTMCNC: 32.2 G/DL (ref 31–37)
MCV RBC AUTO: 94 FL (ref 80–100)
MONOCYTES # BLD AUTO: 0.5 K/UL (ref 0–1)
MONOCYTES NFR BLD AUTO: 8 %
NEUTROPHILS # BLD AUTO: 4.5 K/UL (ref 1.3–7.7)
NEUTROPHILS NFR BLD AUTO: 79 %
PLATELET # BLD AUTO: 146 K/UL (ref 150–450)
POTASSIUM SERPL-SCNC: 4.7 MMOL/L (ref 3.5–5.5)
PROT SERPL-MCNC: 5.7 G/DL (ref 6.2–8.2)
SODIUM SERPL-SCNC: 134 MMOL/L (ref 135–145)
WBC # BLD AUTO: 5.7 K/UL (ref 3.8–10.6)

## 2021-10-31 RX ADMIN — SPIRONOLACTONE SCH MG: 25 TABLET, FILM COATED ORAL at 08:43

## 2021-10-31 RX ADMIN — I-VITE, TAB 1000-60-2MG (60/BT) SCH EACH: TAB at 20:37

## 2021-10-31 RX ADMIN — PRAVASTATIN SODIUM SCH MG: 40 TABLET ORAL at 20:37

## 2021-10-31 RX ADMIN — APIXABAN SCH MG: 5 TABLET, FILM COATED ORAL at 08:42

## 2021-10-31 RX ADMIN — APIXABAN SCH MG: 5 TABLET, FILM COATED ORAL at 20:36

## 2021-10-31 RX ADMIN — CYANOCOBALAMIN TAB 500 MCG SCH MCG: 500 TAB at 08:42

## 2021-10-31 RX ADMIN — DONEPEZIL HYDROCHLORIDE SCH MG: 10 TABLET ORAL at 08:42

## 2021-10-31 RX ADMIN — LATANOPROST SCH DROPS: 50 SOLUTION OPHTHALMIC at 20:44

## 2021-10-31 RX ADMIN — PANTOPRAZOLE SODIUM SCH MG: 40 TABLET, DELAYED RELEASE ORAL at 08:42

## 2021-10-31 RX ADMIN — TIOTROPIUM BROMIDE INHALATION SPRAY SCH PUFF: 3.12 SPRAY, METERED RESPIRATORY (INHALATION) at 09:32

## 2021-10-31 RX ADMIN — I-VITE, TAB 1000-60-2MG (60/BT) SCH EACH: TAB at 08:43

## 2021-10-31 RX ADMIN — LEVOTHYROXINE SODIUM SCH MCG: 25 TABLET ORAL at 06:22

## 2021-10-31 RX ADMIN — FUROSEMIDE SCH MG: 20 TABLET ORAL at 08:42

## 2021-10-31 NOTE — P.PN
Subjective


Progress Note Date: 10/31/21


A 89-year-old present female came in with the left flexed with the pain and 

swelling found to have DVT in the left upper extremity evidence of fractures on 

imaging studies.  patient is already on anticoagulation with Xarelto which is 

for atrial fibrillation at 15 mg at nighttime.  Patient will be switched to 

Eliquis considering that patient and up having DVT in spite of present 

anticoagulation.    Patient does have a swelling because of this DVT for which 

we'll use compression socks and patient will be referred to hematology may need 

vascular surgery evaluation if compression will not help the swelling.  Patient 

was also comparing of pain for which patient will be given anti-inflammatory 

medication.





Patient did declined to go to ECF patient lives by herself but the her 

granddaughter visits her often about 3 times a week and patient wanted to go 

home and can get physical therapy as an outpatient patient memory is very good





10/31/21


Patient is sitting up in bed.  We are pending a formal PT OT evaluation as 

patient and family  decided on ECF on discharge.  Patient was taking xarelto for

prophylaxis due to chronic A. fib, however she did present with a left 

subclavian DVT.  Because of this we transitioned her to oral eliquis on 

discharge. Continue with compression to her left upper extremity.  Evaluation 

from vascular surgery recommends management with compression and 

anticoagulation.





ROS





Constitutional: Denied any fatigue denied any fever.


Cardio vascular: denied any chest pain, palpitations


Gastrointestinal denied any nausea vomiting


Pulmonary: Denied any shortness of breath cough


Neurologic denied any new focal deficits


Musculoskeletal: reports edema to LUE, denies numbness or tingling





All inpatient medications were reviewed and appropriate changes in these 

medications as dictated in the interval history and assessment and plan.





PHYSICAL EXAMINATION: 





GENERAL: The patient is alert and oriented x3, not in any acute distress. Well 

developed, well nourished. 


HEENT: Pupils are round and equally reacting to light. EOMI. No scleral icterus.

No conjunctival pallor. Normocephalic, atraumatic. No pharyngeal erythema. No 

thyromegaly. 


CARDIOVASCULAR: S1 and S2 present. No murmurs, rubs, or gallops. 


PULMONARY: Chest is clear to auscultation, no wheezing or crackles. 


ABDOMEN: Soft, nontender, nondistended, normoactive bowel sounds. No palpable 

organomegaly. 


MUSCULOSKELETAL: No joint swelling or deformity.


EXTREMITIES: Edema of the left upper extremity 


NEUROLOGICAL: Gross neurological examination did not reveal any focal deficits. 


SKIN: No rashes. 





Assessment and plan


-Left subclavian DVT with slowing to the left upper extremity, transition to 

eliquis 5 mg twice a day, does not need 10 mg twice a day as she was on 

anticoagulation at home.


-History of atrial fibrillation, presently in sinus rhythm rate controlled


-COPD


-Gases failure reflux disease


-Hyperlipidemia


-Hypertension





DVT Prophylaxis - Eliquis


GI Prophylaxis - Protonix





FULL CODE





PT/OT evaluation tomorrow and discharge to rehab.











Objective





- Vital Signs


Vital signs: 


                                   Vital Signs











Temp  97.8 F   10/31/21 05:20


 


Pulse  59 L  10/31/21 08:00


 


Resp  16   10/31/21 08:00


 


BP  111/70   10/31/21 05:20


 


Pulse Ox  95   10/31/21 05:20








                                 Intake & Output











 10/30/21 10/31/21 10/31/21





 18:59 06:59 18:59


 


Other:   


 


  Voiding Method   Toilet





   Diaper





   Incontinent


 


  # Voids 5 3 


 


  # Bowel Movements  0 














- Labs


CBC & Chem 7: 


                                 10/31/21 05:35





                                 10/31/21 05:35


Labs: 


                  Abnormal Lab Results - Last 24 Hours (Table)











  10/30/21 10/31/21 10/31/21 Range/Units





  10:58 05:35 05:35 


 


Plt Count   146 L   (150-450)  k/uL


 


Lymphocytes #   0.5 L   (1.0-4.8)  k/uL


 


APTT  155.2 H*    (22.0-30.0)  sec


 


Sodium    134 L  (135-145)  mmol/L


 


Carbon Dioxide    16.2 L  (21.6-31.8)  mmol/L


 


Anion Gap    15.30 H  (4.00-12.00)  mmol/L


 


Est GFR (CKD-EPI)NonAf    59.8 L  (60.0-200.0)   


 


AST    55 H  (13-35)  U/L


 


Alkaline Phosphatase    169 H  ()  U/L


 


Total Protein    5.7 L  (6.2-8.2)  g/dL


 


Albumin    3.4 L  (3.8-4.9)  g/dL


 


Albumin/Globulin Ratio    1.50 L  (1.60-3.17)  g/dL














Assessment and Plan


Time with Patient: Greater than 30

## 2021-11-01 VITALS
SYSTOLIC BLOOD PRESSURE: 144 MMHG | DIASTOLIC BLOOD PRESSURE: 68 MMHG | HEART RATE: 64 BPM | TEMPERATURE: 98.1 F | RESPIRATION RATE: 16 BRPM

## 2021-11-01 LAB
ANION GAP SERPL CALC-SCNC: 8.9 MMOL/L (ref 4–12)
BUN SERPL-SCNC: 10.9 MG/DL (ref 9–27)
BUN/CREAT SERPL: 12.54 RATIO (ref 12–20)
CALCIUM SPEC-MCNC: 9.1 MG/DL (ref 8.7–10.3)
CHLORIDE SERPL-SCNC: 101 MMOL/L (ref 96–109)
CO2 SERPL-SCNC: 25.9 MMOL/L (ref 21.6–31.8)
GLUCOSE SERPL-MCNC: 91 MG/DL (ref 70–110)
POTASSIUM SERPL-SCNC: 4.6 MMOL/L (ref 3.5–5.5)
SODIUM SERPL-SCNC: 135 MMOL/L (ref 135–145)

## 2021-11-01 RX ADMIN — I-VITE, TAB 1000-60-2MG (60/BT) SCH EACH: TAB at 09:19

## 2021-11-01 RX ADMIN — FUROSEMIDE SCH MG: 20 TABLET ORAL at 09:19

## 2021-11-01 RX ADMIN — DONEPEZIL HYDROCHLORIDE SCH MG: 10 TABLET ORAL at 09:19

## 2021-11-01 RX ADMIN — LEVOTHYROXINE SODIUM SCH MCG: 25 TABLET ORAL at 09:19

## 2021-11-01 RX ADMIN — PANTOPRAZOLE SODIUM SCH MG: 40 TABLET, DELAYED RELEASE ORAL at 09:19

## 2021-11-01 RX ADMIN — CYANOCOBALAMIN TAB 500 MCG SCH MCG: 500 TAB at 09:19

## 2021-11-01 RX ADMIN — SPIRONOLACTONE SCH MG: 25 TABLET, FILM COATED ORAL at 09:19

## 2021-11-01 RX ADMIN — TIOTROPIUM BROMIDE INHALATION SPRAY SCH PUFF: 3.12 SPRAY, METERED RESPIRATORY (INHALATION) at 09:04

## 2021-11-01 RX ADMIN — APIXABAN SCH MG: 5 TABLET, FILM COATED ORAL at 09:19

## 2021-11-01 NOTE — P.DS
Providers


Date of admission: 


10/30/21 02:08





Expected date of discharge: 11/01/21


Attending physician: 


Kurt Patel





Consults: 





                                        





10/30/21 02:10


Consult Physician Routine 


   Consulting Provider: Danna Weiss


   Consult Reason/Comments: DVT


   Do you want consulting provider notified?: Yes











Primary care physician: 


Kurt Patel





Hospital Course: 


Final Diagnoses:





Left Subclavian DVT


Left upper extremity swelling secondary to #1


Chronic afib


Pacemaker


Chronic COPD


GERDS


hypertension


Hyperlipidemia














This is an 89-year-old female admitted with left upper extremity swelling 

secondary to left subclavian DVT.  Evaluated by vascular surgery, attributed to 

some degree of anatomic narrowing due to the pacemaker/wires wires felt to be 

the cause of the DVT. no surgical intervention recommended at this 

time,recommended supporting with oral anticoagulation which patient is already 

on Eliquis secondary to chronic A. fib-recommended no indication for acute 

thrombolytics.significant clinical improvement.  Patient will be discharged to 

subacute rehab in stable condition with guarded prognosis.








The impression and plan of care has been dictated as directed.





:


I performed a history and examination of this patient,  discussed the same with 

the dictator.  I agree with the dictator's note ,documented as a scribe.  Any 

additional findings or plans will be noted.





Patient Condition at Discharge: Stable





Plan - Discharge Summary


Discharge Rx Participant: Yes


New Discharge Prescriptions: 


New


   traMADol HCL 50 mg PO Q6H PRN #6 tablet


     PRN Reason: Pain


   Apixaban [Eliquis] 5 mg PO BID #60 tab





Continue


   LORazepam [Ativan] 0.5 mg PO HS PRN


     PRN Reason: rls


   rOPINIRole HCL [Requip] 1 mg PO HS PRN


     PRN Reason: RLS


   Pravastatin Sodium [Pravachol] 40 mg PO HS


   Omeprazole [PriLOSEC] 20 mg PO DAILY


   Tiotropium 18 Mcg/Puff [Spiriva] 2 cap INHALATION RT-DAILY


   Latanoprost Ophth [Xalatan 0.005%] 1 drop BOTH EYES HS


   Spironolactone [Aldactone] 25 mg PO DAILY


   Levothyroxine Sodium [Synthroid] 25 mcg PO DAILY


   Donepezil HCl [Aricept] 10 mg PO DAILY


   Cyanocobalamin (Vitamin B-12) [Vitamin B-12] 1,000 mcg PO DAILY


   Vit C/E/Zn/Coppr/Lutein/Zeaxan [Preservision Areds 2 Softgel] 1 cap PO BID


   Furosemide [Lasix] 20 mg PO DAILY


   Lisinopril [Prinivil] 10 mg PO HS


   Psyllium Husk [Metamucil] 0.4 gm PO TID





Discontinued


   Rivaroxaban [Xarelto] 15 mg PO HS


Discharge Medication List





LORazepam [Ativan] 0.5 mg PO HS PRN 08/02/15 [History]


Omeprazole [PriLOSEC] 20 mg PO DAILY 08/02/15 [History]


Pravastatin Sodium [Pravachol] 40 mg PO HS 08/02/15 [History]


Tiotropium 18 Mcg/Puff [Spiriva] 2 cap INHALATION RT-DAILY 08/02/15 [History]


rOPINIRole HCL [Requip] 1 mg PO HS PRN 08/02/15 [History]


Cyanocobalamin (Vitamin B-12) [Vitamin B-12] 1,000 mcg PO DAILY 09/11/19 

[History]


Donepezil HCl [Aricept] 10 mg PO DAILY 09/11/19 [History]


Latanoprost Ophth [Xalatan 0.005%] 1 drop BOTH EYES HS 09/11/19 [History]


Levothyroxine Sodium [Synthroid] 25 mcg PO DAILY 09/11/19 [History]


Spironolactone [Aldactone] 25 mg PO DAILY 09/11/19 [History]


Vit C/E/Zn/Coppr/Lutein/Zeaxan [Preservision Areds 2 Softgel] 1 cap PO BID 

09/11/19 [History]


Furosemide [Lasix] 20 mg PO DAILY 04/21/21 [History]


Lisinopril [Prinivil] 10 mg PO HS 10/03/21 [History]


Psyllium Husk [Metamucil] 0.4 gm PO TID 10/03/21 [History]


Apixaban [Eliquis] 5 mg PO BID #60 tab 10/30/21 [Rx]


traMADol HCL 50 mg PO Q6H PRN #6 tablet 10/30/21 [Rx]








Follow up Appointment(s)/Referral(s): 


Kurt Patel DO [Primary Care Provider] - 1 Week (after DC from subacute rehab)


Discharge Disposition: HOME WITH HOME HEALTH SERVICES

## 2021-11-26 ENCOUNTER — HOSPITAL ENCOUNTER (EMERGENCY)
Dept: HOSPITAL 47 - EC | Age: 86
Discharge: HOME | End: 2021-11-26
Payer: MEDICARE

## 2021-11-26 VITALS
HEART RATE: 79 BPM | TEMPERATURE: 98.5 F | SYSTOLIC BLOOD PRESSURE: 151 MMHG | RESPIRATION RATE: 20 BRPM | DIASTOLIC BLOOD PRESSURE: 58 MMHG

## 2021-11-26 DIAGNOSIS — E78.5: ICD-10-CM

## 2021-11-26 DIAGNOSIS — I48.91: ICD-10-CM

## 2021-11-26 DIAGNOSIS — K21.9: ICD-10-CM

## 2021-11-26 DIAGNOSIS — Z88.0: ICD-10-CM

## 2021-11-26 DIAGNOSIS — Z90.49: ICD-10-CM

## 2021-11-26 DIAGNOSIS — Z88.2: ICD-10-CM

## 2021-11-26 DIAGNOSIS — I82.622: Primary | ICD-10-CM

## 2021-11-26 DIAGNOSIS — J44.9: ICD-10-CM

## 2021-11-26 DIAGNOSIS — I11.0: ICD-10-CM

## 2021-11-26 DIAGNOSIS — Z87.891: ICD-10-CM

## 2021-11-26 DIAGNOSIS — F41.9: ICD-10-CM

## 2021-11-26 DIAGNOSIS — Z79.01: ICD-10-CM

## 2021-11-26 DIAGNOSIS — Z88.1: ICD-10-CM

## 2021-11-26 DIAGNOSIS — I50.9: ICD-10-CM

## 2021-11-26 DIAGNOSIS — Z90.710: ICD-10-CM

## 2021-11-26 PROCEDURE — 99283 EMERGENCY DEPT VISIT LOW MDM: CPT

## 2021-11-26 NOTE — ED
General Adult HPI





- General


Chief complaint: Extremity Problem,Nontraumatic


Stated complaint: lt arm swelling/recent blood clot


Time Seen by Provider: 21 18:30


Source: patient, RN notes reviewed


Mode of arrival: ambulatory


Limitations: no limitations





- History of Present Illness


Initial comments: 





89-year-old female presents to the emergency room for left arm swelling.  

Patient states she has had swelling of the left arm for about 3 days now.  

Patient recently was diagnosed with a DVT of the left arm but the swelling had 

subsided.  Family states the patient is supposed to be taking eliquis but has 

been taking her xarelto instead.  No fevers.  No chest pain or shortness of 

breath.Patient has no other complaints at this time including shortness of 

breath, chest pain, abdominal pain, nausea or vomiting, headache, or visual 

changes.





- Related Data


                                Home Medications











 Medication  Instructions  Recorded  Confirmed


 


Omeprazole [PriLOSEC] 20 mg PO AC-BRKFST 08/02/15 11/26/21


 


Pravastatin Sodium [Pravachol] 40 mg PO HS 08/02/15 11/26/21


 


Tiotropium 18 Mcg/Puff [Spiriva] 2 cap INHALATION RT-DAILY 08/02/15 11/26/21


 


rOPINIRole HCL [Requip] 1 mg PO HS 08/02/15 11/26/21


 


Cyanocobalamin (Vitamin B-12) 1,000 mcg PO DAILY 19





[Vitamin B-12]   


 


Donepezil HCl [Aricept] 10 mg PO DAILY 19


 


Latanoprost Ophth [Xalatan 0.005%] 1 drop BOTH EYES HS 19


 


Levothyroxine Sodium [Synthroid] 25 mcg PO DAILY 19


 


Spironolactone [Aldactone] 25 mg PO DAILY 19


 


Vit C/E/Zn/Coppr/Lutein/Zeaxan 1 cap PO DAILY 19





[Preservision Areds 2 Softgel]   


 


Furosemide [Lasix] 20 mg PO DAILY 21


 


Acetaminophen Tab [Tylenol] 325 mg PO Q4H PRN 21


 


LORazepam [Ativan] 0.5 mg PO HS PRN 21


 


lisinopriL [Zestril] 5 mg PO HS 21


 


traMADol HCl [Ultram] 50 mg PO Q6H PRN 21








                                  Previous Rx's











 Medication  Instructions  Recorded


 


Apixaban [Eliquis] 5 mg PO BID #60 tab 10/30/21











                                    Allergies











Allergy/AdvReac Type Severity Reaction Status Date / Time


 


Penicillins Allergy  Rash/Hives Verified 21 19:47


 


sulfamethoxazole Allergy  Rash/Hives Verified 21 19:47





[From Bactrim]     


 


trimethoprim [From Bactrim] Allergy  Rash/Hives Verified 21 19:47














Review of Systems


ROS Statement: 


Those systems with pertinent positive or pertinent negative responses have been 

documented in the HPI.





ROS Other: All systems not noted in ROS Statement are negative.





Past Medical History


Past Medical History: Atrial Fibrillation, Heart Failure, COPD, GERD/Reflux, GI 

Bleed, Hyperlipidemia, Hypertension


Additional Past Medical History / Comment(s): restless leg syndrome


History of Any Multi-Drug Resistant Organisms: None Reported


Past Surgical History: Appendectomy, Cholecystectomy, Hysterectomy, Joint 

Replacement, Orthopedic Surgery


Additional Past Surgical History / Comment(s): cataracts removed, bilateral 

knees


Past Anesthesia/Blood Transfusion Reactions: No Reported Reaction


Type of Cardiac Device: Biventricular Pacemaker


Device Placement Date:: 2021


Past Psychological History: Anxiety


Smoking Status: Former smoker


Past Alcohol Use History: None Reported


Past Drug Use History: None Reported





- Past Family History


  ** Father


Family Medical History: Coronary Artery Disease (CAD)


Additional Family Medical History / Comment(s): rhemuatic fever,  at 59 of 

"bad heart"





  ** Mother


Family Medical History: Cancer


Additional Family Medical History / Comment(s): uterus





General Exam





- General Exam Comments


Initial Comments: 





Left arm: Patient does have moderate edema noted of the left arm without 

erythema.  Radial pulse 2+.  No abscess or cellulitis.  Full range of motion.


Limitations: no limitations


General appearance: alert, in no apparent distress


Head exam: Present: atraumatic


Eye exam: Present: normal appearance, PERRL, EOMI.  Absent: scleral icterus, 

conjunctival injection


ENT exam: Present: normal exam, mucous membranes moist


Neck exam: Present: normal inspection, full ROM.  Absent: tenderness


Respiratory exam: Present: normal lung sounds bilaterally.  Absent: respiratory 

distress, wheezes, rales


Cardiovascular Exam: Present: regular rate, normal rhythm, normal heart sounds


GI/Abdominal exam: Present: soft.  Absent: distended, tenderness, normal bowel 

sounds


Neurological exam: Present: alert





Course


                                   Vital Signs











  21





  16:40


 


Temperature 98.5 F


 


Pulse Rate 79


 


Respiratory 20





Rate 


 


Blood Pressure 151/58


 


O2 Sat by Pulse 98





Oximetry 














Medical Decision Making





- Medical Decision Making





Vitals are stable.  Patient is well-appearing.  Radial pulse 2+ left upper 

extremity.  No phlegmasia.  Skin is normal.  She does have mild edema.  Ult

rasound does reveal DVT in the left subclavian vein consistent with previous 

ultrasound.  I did review the note from Dr. Weiss.  It appears that there is no 

indication to stent the area as it is caused by pacemaker wires nor is she 

requiring a TPA as she is having good pulses and arterial flow.  I suspect that 

this swelling is an expected minor complication of DVT.  She will follow up with

her surgeon.  She will return here for any worsening symptoms.





Patient does have rings on her left hand.  I did strongly recommend we cut these

off however patient is refusing.  She states she will come back if they are 

getting tighter





Disposition


Clinical Impression: 


 Deep vein thrombosis (DVT) of upper extremity





Disposition: HOME SELF-CARE


Condition: Good


Instructions (If sedation given, give patient instructions):  Deep Vein 

Thrombosis (ED)


Additional Instructions: 


Please follow up with primary care and vascular surgery in 1-2 days. Return to 

the ER for any worsening symptoms. 


Is patient prescribed a controlled substance at d/c from ED?: No


Referrals: 


Kurt Patel DO [Primary Care Provider] - 1-2 days


Danna Weiss DO [STAFF PHYSICIAN] - 1-2 days


Time of Disposition: 20:29

## 2021-11-26 NOTE — US
EXAMINATION TYPE: US venous doppler duplex UE LT

 

DATE OF EXAM: 11/26/2021

 

COMPARISON: US

 

CLINICAL HISTORY: worsening edema, DVT history a few weeks ago. Left arm swelling, known recent DVT

 

SIDE PERFORMED: Left

 

 

 

Left Arm: Rouleaux flow in similar locations when compared to previous such as, IJV, distal subclavia
n, axillary, and basilic veins/ Probable occlusive deep venous thrombus within proximal subclavian ve
in surrounding pacemaker leads/ unable to visualized left ulnar veins due to limited pt mobility 

 

 

 

IMPRESSION: 

There is evidence of deep vein thrombosis in the left subclavian vein. There is patency of the axilla
ry vein.